# Patient Record
Sex: FEMALE | Race: WHITE | NOT HISPANIC OR LATINO | Employment: PART TIME | URBAN - METROPOLITAN AREA
[De-identification: names, ages, dates, MRNs, and addresses within clinical notes are randomized per-mention and may not be internally consistent; named-entity substitution may affect disease eponyms.]

---

## 2017-03-08 ENCOUNTER — OFFICE VISIT (OUTPATIENT)
Dept: OBGYN | Facility: CLINIC | Age: 22
End: 2017-03-08
Attending: NURSE PRACTITIONER
Payer: COMMERCIAL

## 2017-03-08 VITALS
HEIGHT: 64 IN | DIASTOLIC BLOOD PRESSURE: 76 MMHG | WEIGHT: 212.7 LBS | SYSTOLIC BLOOD PRESSURE: 118 MMHG | BODY MASS INDEX: 36.31 KG/M2 | HEART RATE: 60 BPM

## 2017-03-08 DIAGNOSIS — Z30.011 ENCOUNTER FOR INITIAL PRESCRIPTION OF CONTRACEPTIVE PILLS: ICD-10-CM

## 2017-03-08 DIAGNOSIS — N89.8 VAGINAL ITCHING: ICD-10-CM

## 2017-03-08 DIAGNOSIS — Z00.00 VISIT FOR PREVENTIVE HEALTH EXAMINATION: Primary | ICD-10-CM

## 2017-03-08 DIAGNOSIS — B37.31 VULVOVAGINAL CANDIDIASIS: ICD-10-CM

## 2017-03-08 DIAGNOSIS — Z13.1 SCREENING FOR DIABETES MELLITUS: ICD-10-CM

## 2017-03-08 DIAGNOSIS — Z72.51 UNPROTECTED SEXUAL INTERCOURSE: ICD-10-CM

## 2017-03-08 DIAGNOSIS — Z11.3 SCREENING EXAMINATION FOR VENEREAL DISEASE: ICD-10-CM

## 2017-03-08 LAB
CLUE CELLS: NORMAL
HBA1C MFR BLD: 5.8 % (ref 4.3–6)
HCG UR QL: NEGATIVE
HCV AB SERPL QL IA: NORMAL
HIV 1+2 AB+HIV1 P24 AG SERPL QL IA: NORMAL
INTERNAL QC OK POCT: YES
TRICHOMONAS (WET PREP): NORMAL
YEAST (WET PREP): NORMAL

## 2017-03-08 PROCEDURE — 99212 OFFICE O/P EST SF 10 MIN: CPT | Mod: ZF

## 2017-03-08 PROCEDURE — 87389 HIV-1 AG W/HIV-1&-2 AB AG IA: CPT | Performed by: NURSE PRACTITIONER

## 2017-03-08 PROCEDURE — 87210 SMEAR WET MOUNT SALINE/INK: CPT | Mod: ZF | Performed by: NURSE PRACTITIONER

## 2017-03-08 PROCEDURE — 87591 N.GONORRHOEAE DNA AMP PROB: CPT | Performed by: NURSE PRACTITIONER

## 2017-03-08 PROCEDURE — 81025 URINE PREGNANCY TEST: CPT | Mod: ZF | Performed by: NURSE PRACTITIONER

## 2017-03-08 PROCEDURE — 83036 HEMOGLOBIN GLYCOSYLATED A1C: CPT | Performed by: NURSE PRACTITIONER

## 2017-03-08 PROCEDURE — 36415 COLL VENOUS BLD VENIPUNCTURE: CPT | Performed by: NURSE PRACTITIONER

## 2017-03-08 PROCEDURE — 87491 CHLMYD TRACH DNA AMP PROBE: CPT | Performed by: NURSE PRACTITIONER

## 2017-03-08 PROCEDURE — 86803 HEPATITIS C AB TEST: CPT | Performed by: NURSE PRACTITIONER

## 2017-03-08 PROCEDURE — 86780 TREPONEMA PALLIDUM: CPT | Performed by: NURSE PRACTITIONER

## 2017-03-08 RX ORDER — NORELGESTROMIN AND ETHINYL ESTRADIOL 35; 150 UG/MG; UG/MG
1 PATCH TRANSDERMAL WEEKLY
Qty: 3 PATCH | Refills: 11 | Status: SHIPPED | OUTPATIENT
Start: 2017-03-08 | End: 2019-08-28

## 2017-03-08 RX ORDER — LEVONORGESTREL 1.5 MG/1
1.5 TABLET ORAL ONCE
Qty: 1 TABLET | Refills: 0 | Status: SHIPPED | OUTPATIENT
Start: 2017-03-08 | End: 2019-08-28

## 2017-03-08 RX ORDER — FLUCONAZOLE 150 MG/1
150 TABLET ORAL ONCE
Qty: 1 TABLET | Refills: 2 | Status: SHIPPED | OUTPATIENT
Start: 2017-03-08 | End: 2017-03-08

## 2017-03-08 NOTE — LETTER
"3/8/2017       RE: Angelia Epstein  [address] [facility] [address]     Dear Colleague,    Thank you for referring your patient, Angelia Epstein, to the [facility] at [facility]. Please see a copy of my visit note below.    Progress Note    SUBJECTIVE:  Angelia Epstein is an 21 year old, , who requests an Annual Preventive Exam.     Concerns today include:   1) Recurrent yeast infections: Currently has thick, white \"clumpy\" discharge and itching x 2 week; feels her introitus and vulva are \"inflamed.\"  Has not tried to treat these symptoms with anything; has been getting yeast infections about once per month; typically self-treats with OTC Monistat 1 day, occasionally uses Monistat 3 day.  Was treated once, several months ago, at [facility] with Flu[facility]; she reports that she did not have a yeast infection for several months after this (feels it was much a more effective treatment). Denies pelvic pain, abdominal pain, or malodorous discharge.    2) Desire for Contraception: Had used the combined hormonal birth control patch for ~8-10 months this year.  Stopped using this 1 month ago when her prescription ran out.  She had been prescribed [facility] at a [facility].  Desires a prescription for the patch today; she was pleased with this method.  Last unprotected intercourse was 1 day prior to her LMP (~8 days ago); her last period was normal.  While not using the patch, she used condoms ~50% of the time.  LMP was 3/1/2017. Menses are typically regular, once per month; experiences moderate flow that lasts 4-5 days.  Does experience moderate cramping and onset of fevers with menstrual cycles, as well as mood swings, becoming more tearful and emotional.  Has discussed all other birth control options, including Depo Provera (doesn't like injections), COCs (had trouble remembering pills everyday), Nexplanon and IUDs (doesn't want " anything placed in her body & has friends who disliked these methods), and the NuvaRing (doesn't feel comfortable placing it), and declined all other methods.  Patient smokes ~4 cigarettes per week.  She is working on quitting. BMI: 36kg/m2.  Denies any hx of clotting, migraines, heart, liver, or kidney disorders.  No family hx of DVT.      3) Request for STD testing: Has had 5 partners in the last 2 months and 7 partners in the last 6 months.  Denies any known STD exposures.  No noticeable lesions.      PHQ-9 score today: 2  NABEEL-7 score today: 7  - Feel safe  - Denies any suicidal thoughts  - Reports it is somewhat difficult to do her daily work and take care of things at home.  She feels a lot of stress at work and at school.  She is studying to go into Social Work at a [employer] and hopes to transfer to the [employer] after completing her pre-requisites.  Works as a .  Living with her father.      Nutrition: eats out once per day; <4-5 fruits and vegetables per day  Exercise: has a [employer] membership; exercises 1-4 times per week.      Menstrual History:  Menstrual History 10/14/2014 3/8/2017 3/8/2017   LAST MENSTRUAL PERIOD 10/1/2014 3/1/2017 -   Period Cycle (Days) - - 28   Period Duration (Days) - - 5   Period Pattern - - Regular   Menstrual Flow - - Moderate   Reviewed Today - - Yes     Last pap: never had a pap smear  History of abnormal Pap smear: NO - under age 21, PAP not appropriate for age    HISTORY:    No current outpatient prescriptions on file prior to visit.  No current facility-administered medications on file prior to visit.   Allergies   Allergen Reactions     Cats Rash     Rash, eyes get swollen       Immunization History   Administered Date(s) Administered     DPT 02/25/1997, 09/02/1997, 02/25/1999, 08/14/2002, 04/04/2003     DTAP (<7y) 08/14/2002     Hepatitis B 01/02/1997, 02/25/1997, 09/02/1997     Human Papilloma Virus 08/17/2011, 02/29/2012, 10/14/2014     IPV  "2002     MMR 1998, 2002     Mantoux 03/10/2015     Meningococcal (Menactra ) 2010     OPV 1997, 1997, 1997, 2002     TDAP (ADACEL AGES 11-64) 2010     Varicella Not Indicated - By Hx 01/15/2005, 2005     Obstetric History       T0      TAB0   SAB0   E0   M0   L0      History reviewed. No pertinent past medical history.  Past Surgical History   Procedure Laterality Date     Cystectomy pilonidal  3/7/2012     Procedure:CYSTECTOMY PILONIDAL; I & D of Pilonidal cyst; Surgeon:ROSARIO GOODE; Location:[facility]     Head & neck surgery       tonsilectomy     Family History   Problem Relation Age of Onset     Allergies Father      Allergies Maternal Aunt      Allergies Paternal Aunt      Hypertension Paternal Grandfather      CEREBROVASCULAR DISEASE Paternal Grandfather      DIABETES Paternal Grandfather      Social History     Social History     Marital status: Single     Spouse name: N/A     Number of children: N/A     Years of education: N/A     Social History Main Topics     Smoking status: Light Tobacco Smoker     Smokeless tobacco: Never Used     Alcohol use No     Drug use: Yes     Sexual activity: Yes     Partners: Male     ROS  ROS: 10 point ROS neg other than the symptoms noted above in the HPI.    EXAM:  Blood pressure 118/76, pulse 60, height 1.632 m (5' 4.25\"), weight 96.5 kg (212 lb 11.2 oz), last menstrual period 2017. Body mass index is 36.23 kg/(m^2).  General - pleasant female in no acute distress.  Skin - no suspicious lesions or rashes  EENT-  PERRLA, euthyroid with out palpable nodules  Neck - supple without lymphadenopathy.  Lungs - clear to auscultation bilaterally.  Heart - regular rate and rhythm without murmur.  Abdomen - soft, nontender, nondistended, no masses or organomegaly noted.  Musculoskeletal - no gross deformities.  Neurological - normal strength, sensation, and mental status.    Breast Exam:  Breast: Without " visible skin changes. No dimpling or lesions seen.   Breasts supple, non-tender with palpation, no dominant mass, nodularity, or nipple discharge noted bilaterally. Axillary nodes negative.    Pelvic Exam:  EG/BUS: introitus and bilateral labia minora erythematous; without lesions or abnormal secretions; Bartholin's, Urethra, [place]'s normal  Urethral meatus: normal   Urethra: no masses, tenderness, or scarring   Bladder: no masses or tenderness   Vagina: moist, pink, rugae with white and cheesy secretions  Cervix: pink, moist, closed, without lesion or CMT  Uterus: anteverted, and small, smooth, firm, mobile w/o pain  Adnexa: Within normal limits and no masses, nodularity, tenderness  Rectum: anus normal     ASSESSMENT:  Encounter Diagnoses   Name Primary?     Visit for preventive health examination Yes     Screening examination for venereal disease      Vaginal itching      Unprotected sexual intercourse      Encounter for initial prescription of contraceptive pills      Vulvovaginal candidiasis      Screening for diabetes mellitus       PLAN:   Orders Placed This Encounter   Procedures     Anti Treponema     Hepatitis C antibody     HIV Antigen Antibody Combo     Hgb A1c     Wet Prep POCT     hCG qual urine POCT     Orders Placed This Encounter   Medications     levonorgestrel (PLAN B) 1.5 MG TABS tablet     Sig: Take 1 tablet (1.5 mg) by mouth once for 1 dose     Dispense:  1 tablet     Refill:  0     norelgestromin-ethinyl estradiol (XULANE) 150-35 MCG/24HR patch     Sig: Place 1 patch onto the skin once a week     Dispense:  3 patch     Refill:  11     fluconazole (DIFLUCAN) 150 MG tablet     Sig: Take 1 tablet (150 mg) by mouth once for 1 dose     Dispense:  1 tablet     Refill:  2   UPT was negative today. Discussed all birth control methods including their method of use, benefits, and risks.  Patient declined all other birth control methods except for the combined hormonal patch.  She was counseled on the  increased risk of DVT and stroke with the combination of estrogen, obesity, and smoking.  Patient expressed understanding and is willing to work on quitting smoking (~4 cigarettes per week).  Offered assistance, but patient declined.  Has friends who have been taking the cigarettes away from her and are supportive of her quitting.  Discussed proper placement of the patch on her upper body, avoiding the breasts.  Each patch to stay in place for 1 week.  Remove patch after 1 week and place a new patch.  After using the patches for 3 weeks, patient to take 1 week off and can expect to have a withdrawal bleed.  Patient also requested a Plan B prescription; Rx provided.  Recommended condom use for STD prevention.      Discussed recommendations for diet and exercise and the goal of 150 minutes of exercise per week and 4-5 + fruits/ vegetables.  Encouraged Angelia to eat out less and prepare meals at home more often.      STD testing completed today.  Screen for diabetes was ordered today given BMI and recurrent yeast infections.    Offered Flu vaccine; patient declined.    First pap smear due in 1 year.    Vulvovaginal candidiasis: Treat with Fluconazole 150mg PO; if symptoms are not resolved in 3 days, may take 2nd dose.  Also provided patient with 1 refill.  Encouraged that if she self-treats yeast infections, to choose Monistat 5 day rather than 1 day.     Encouraged patient to establish care with psychologist at [facility].  Patient to return to clinic if mental health symptoms worsen and/or she is interested in pharmacotherapy.      Additional teaching done at this visit regarding self breast exam, exercise, birth control, smoking cessation, mental health and weight/diet.    Return to clinic in one year.  Follow-up as needed.    Anna Navas, DNP, APRN, WHNP

## 2017-03-08 NOTE — MR AVS SNAPSHOT
After Visit Summary   3/8/2017    Angelia Epstein    MRN: [id number]           Patient Information     Date Of Birth          1995        Visit Information        Provider Department      3/8/2017 1:00 PM Anna Navas APRN CNP [facility]        Today's Diagnoses     Visit for preventive health examination    -  1    Screening examination for venereal disease        Vaginal itching        Unprotected sexual intercourse        Encounter for initial prescription of contraceptive pills        Vulvovaginal candidiasis          Care Instructions    Your pregnancy test was negative today.  You may start the birth control patch today.  Place this weekly on upper body, avoiding the breasts.  Change the placement of the patch each week to avoid skin irritation.  After using the patch for 3 weeks, leave the patch off for 1 week; you can expect a withdrawal bleed at this time.  Plan B prescription was also provided.    Encouraged quitting smoking.  Will screen for diabetes today.    Yeast infection: Take 1 dose of Fluconazole today.  If you symptoms are not completely resolve, may take a 2nd dose 72 hours after the first dose.  I also wrote 1 extra refill.    Gonorrhea & chlamydia tests were done today.  Please go to the lab to have your other STD labs completed.     PREVENTIVE HEALTH RECOMMENDATIONS:   Most women need a yearly breast and pelvic exam.    A PAP screen, a test done DURING a pelvic exam, is NO longer recommended yearly.    March 2013, screening guidelines recommended by ACOG for PAP screen are:    1) First pap at age 21.    2) Pap every 3 years until age 30.    3) After age 30, pap every 3 years or Pap with HR HPV screen every 5 years until age 65.  4) Women do NOT need a vaginal Pap screen after a hysterectomy (surgical removal of the uterus) when they have not had cancer.    Exceptions:  1) Yearly pap if HIV+ or immunosuppressed secondary to organ transplant  2)  CLARY II-III continue routine screening for 20 years.    I encourage you continue looking for opportunities to choose a healthy lifestyle:       * Choose to eat a heart healthy diet. Check out the FOOD PLATE guidelines at: http://www.choosemyplate.gov/ for helpful hints on weight and cholesterol management.  Balance your caloric intake with exercise to maintain a BMI in the 22 to 26 range. For bone health: Eat calcium-rich foods like yogurt, broccoli or take chewable calcium pills (500 to 600 mg) twice a day with food.       * Exercise for at least an average of 30 minutes a day, 5 days of the week. This will help you control your weight, release stress, and help prevent disease.      * Take a Vitamin D3 supplement daily fall through spring and during summer unless you cgpa24-36' full body sun exposure to skin without sunscreen.      * DO wear sunscreen to prevent skin cancer after the first 15-30 minutes.      * Identify stressors in your life, find ways to release the stress, and, make time for yourself. PLEASE ask for help if mood changes last longer than two weeks.     * Limit alcohol to one drink per day.  No smoking.  Avoid second hand smoke. If you smoke, ask for help to stop.       *  If you are in a sexual relationship, talk with your partner about possible infection risks and take action to protect yourself from exposure to a sexual infection.    Please request an infection screen for STIs (sexually transmitted infections) if you are less than age 26 OR believe that you may be at risk.     Get a flu shot each year. Get a tetanus shot every 10 years. EVERYONE needs a pertussis (Whooping cough) booster.    See your dentist twice a year for an exam and preventive care cleaning.     Consider the following screen tests:    1) cholesterol test every 5 years.     2) yearly mammogram after age 40 unless you have identified risks.    3) colonoscopy every 10 years after age 50 unless you have identified risks.    4)  diabetes blood test screening if you are at risk for diabetes.      Additional information that you may also find helpful:  The Internet now gives us access to LOTS of information -- some of it helpful, research documented and also plenty of harmful, anecdotal information that may not pertain to your situtaion. Consider visiting the following websites for accurate health information:    www.vitamindcouncil.org/ : Info and ongoing research re Vitamin D    www.fairview.org : Up to date and easily searchable information on multiple topics.    www.medlineplus.gov : medication info, interactive tutorials, watch real surgeries online    www.cdc.gov : public health info, travel advisories, epidemics (H1N1)    www.mike/std.org: current research re diagnosis, treatment and prevention of sexually contacted infections.    www.health.[facility].[place] : [place] dept of heat, public health issues in [place], N1N1    www.familydoctor.org : good info from the Academy of Family Physicians              Follow-ups after your visit        Follow-up notes from your care team     Return in 1 year (on 3/8/2018) for Preventative Health Visit.      Who to contact     Please call your clinic at [phone] to:    Ask questions about your health    Make or cancel appointments    Discuss your medicines    Learn about your test results    Speak to your doctor   If you have compliments or concerns about an experience at your clinic, or if you wish to file a complaint, please contact [facility] Patient Relations at [phone] or email us at [email or web address]         Additional Information About Your Visit        [email or web address] Information     TextPayMe is an electronic gateway that provides easy, online access to your medical records. With TextPayMe, you can request a clinic appointment, read your test results, renew a prescription or communicate with your care team.     To sign up for [employer] visit the website at  "[email or web address]   You will be asked to enter the access code listed below, as well as some personal information. Please follow the directions to create your username and password.     Your access code is: [email or web address]  Expires: 2017  2:00 PM     Your access code will  in 90 days. If you need help or a new code, please contact your [facility] or call [phone] for assistance.        Care EveryWhere ID     This is your Care EveryWhere ID. This could be used by other organizations to access your [facility] medical records  [id number]        Your Vitals Were     Pulse Height Last Period BMI (Body Mass Index)          60 1.632 m (5' 4.25\") 2017 (Exact Date) 36.23 kg/m2         Blood Pressure from Last 3 Encounters:   17 118/76   07/28/15 106/64   07/14/15 116/69    Weight from Last 3 Encounters:   17 96.5 kg (212 lb 11.2 oz)   07/28/15 93.4 kg (206 lb) (98 %)*   07/14/15 91.6 kg (202 lb) (98 %)*     * Growth percentiles are based on CDC 2-20 Years data.              We Performed the Following     Anti Treponema     Chlamydia trachomatis PCR Swab      hCG qual urine POCT     Hepatitis C antibody     HIV Antigen Antibody Combo     Neisseria gonorrhoeae PCR Swab [[id number]]     Wet Prep POCT          Today's Medication Changes          These changes are accurate as of: 3/8/17  2:04 PM.  If you have any questions, ask your nurse or doctor.               Start taking these medicines.        Dose/Directions    fluconazole 150 MG tablet   Commonly known as:  DIFLUCAN   Used for:  Vulvovaginal candidiasis   Started by:  Anna Navas APRN CNP        Dose:  150 mg   Take 1 tablet (150 mg) by mouth once for 1 dose   Quantity:  1 tablet   Refills:  2       levonorgestrel 1.5 MG Tabs tablet   Commonly known as:  PLAN B   Used for:  Encounter for initial prescription of contraceptive pills   Started by:  Anna Navas APRN CNP        Dose:  1.5 mg "   Take 1 tablet (1.5 mg) by mouth once for 1 dose   Quantity:  1 tablet   Refills:  0       norelgestromin-ethinyl estradiol 150-35 MCG/24HR patch   Commonly known as:  XULANE   Used for:  Encounter for initial prescription of contraceptive pills   Started by:  Anna Navas APRN CNP        Dose:  1 patch   Place 1 patch onto the skin once a week   Quantity:  3 patch   Refills:  11            Where to get your medicines      These medications were sent to [facility] [address] [place] [address] & [address] [address]    Hours:  24-hours Phone:  [phone]     fluconazole 150 MG tablet    levonorgestrel 1.5 MG Tabs tablet    norelgestromin-ethinyl estradiol 150-35 MCG/24HR patch                Primary Care Provider Office Phone # Fax #    Long Grossman [phone]       [facility] [address] [place] [id number]        Thank you!     Thank you for choosing [facility]  for your care. Our goal is always to provide you with excellent care. Hearing back from our patients is one way we can continue to improve our services. Please take a few minutes to complete the written survey that you may receive in the mail after your visit with us. Thank you!             Your Updated Medication List - Protect others around you: Learn how to safely use, store and throw away your medicines at www.disposemymeds.org.          This list is accurate as of: 3/8/17  2:04 PM.  Always use your most recent med list.                   Brand Name Dispense Instructions for use    fluconazole 150 MG tablet    DIFLUCAN    1 tablet    Take 1 tablet (150 mg) by mouth once for 1 dose       levonorgestrel 1.5 MG Tabs tablet    PLAN B    1 tablet    Take 1 tablet (1.5 mg) by mouth once for 1 dose       norelgestromin-ethinyl estradiol 150-35 MCG/24HR patch    XULANE    3 patch    Place 1 patch onto  the skin once a week

## 2017-03-08 NOTE — PROGRESS NOTES
"    Progress Note    SUBJECTIVE:  Angelia Epstein is an 21 year old, , who requests an Annual Preventive Exam.     Concerns today include:   1) Recurrent yeast infections: Currently has thick, white \"clumpy\" discharge and itching x 2 week; feels her introitus and vulva are \"inflamed.\"  Has not tried to treat these symptoms with anything; has been getting yeast infections about once per month; typically self-treats with OTC Monistat 1 day, occasionally uses Monistat 3 day.  Was [facility] once, [facility] months ago, at [facility]; she reports that she did not have a yeast infection for several months after this (feels it was much a more effective treatment). Denies pelvic pain, abdominal pain, or malodorous discharge.    2) Desire for Contraception: Had used the combined hormonal birth control patch for ~8-10 months this year.  Stopped using this 1 month ago when her prescription ran out.  She had [facility] a [facility].  Desires a prescription for the patch today; she was pleased with this method.  Last unprotected intercourse was 1 day prior to her LMP (~8 days ago); her last period was normal.  While not using the patch, she used condoms ~50% of the time.  LMP was 3/1/2017. Menses are typically regular, once per month; experiences moderate flow that lasts 4-5 days.  Does experience moderate cramping and onset of fevers with menstrual cycles, as well as mood swings, becoming more tearful and emotional.  Has discussed all other birth control options, including Depo Provera (doesn't like injections), COCs (had trouble remembering pills everyday), Nexplanon and IUDs (doesn't want anything placed in her body & has friends who disliked these methods), and the NuvaRing (doesn't feel comfortable placing it), and declined all other methods.  Patient smokes ~4 cigarettes per week.  She is working on quitting. BMI: 36kg/m2.  Denies any hx of clotting, migraines, heart, liver, or " kidney disorders.  No family hx of DVT.      3) Request for STD testing: Has had 5 partners in the last 2 months and 7 partners in the last 6 months.  Denies any known STD exposures.  No noticeable lesions.      PHQ-9 score today: 2  NABEEL-7 score today: 7  - Feel safe  - Denies any suicidal thoughts  - Reports it is somewhat difficult to do her daily work and take care of things at home.  She feels a lot of stress at work and at school.  She is studying to go into Social Work at a [employer] and hopes to transfer to the [employer] after completing her pre-requisites.  Works as a .  Living with her father.      Nutrition: eats out once per day; <4-5 fruits and vegetables per day  Exercise: has a [employer] membership; exercises 1-4 times per week.      Menstrual History:  Menstrual History 10/14/2014 3/8/2017 3/8/2017   LAST MENSTRUAL PERIOD 10/1/2014 3/1/2017 -   Period Cycle (Days) - - 28   Period Duration (Days) - - 5   Period Pattern - - Regular   Menstrual Flow - - Moderate   Reviewed Today - - Yes     Last pap: never had a pap smear  History of abnormal Pap smear: NO - under age 21, PAP not appropriate for age    HISTORY:    No current outpatient prescriptions on file prior to visit.  No current facility-administered medications on file prior to visit.   Allergies   Allergen Reactions     Cats Rash     Rash, eyes get swollen       Immunization History   Administered Date(s) Administered     DPT 1997, 1997, 1999, 2002, 2003     DTAP (<7y) 2002     Hepatitis B 1997, 1997, 1997     Human Papilloma Virus 2011, 2012, 10/14/2014     IPV 2002     MMR 1998, 2002     Mantoux 03/10/2015     Meningococcal (Menactra ) 2010     OPV 1997, 1997, 1997, 2002     TDAP (ADACEL AGES 11-64) 2010     Varicella Not Indicated - By Hx 01/15/2005, 2005     Obstetric History       T0    "   TAB0   SAB0   E0   M0   L0      History reviewed. No pertinent past medical history.  Past Surgical History   Procedure Laterality Date     Cystectomy pilonidal  3/7/2012     Procedure:CYSTECTOMY PILONIDAL; I & D of Pilonidal cyst; Surgeon:ROSARIO GOODE; Location:[facility]     Head & neck surgery       tonsilectomy     Family History   Problem Relation Age of Onset     Allergies Father      Allergies Maternal Aunt      Allergies Paternal Aunt      Hypertension Paternal Grandfather      CEREBROVASCULAR DISEASE Paternal Grandfather      DIABETES Paternal Grandfather      Social History     Social History     Marital status: Single     Spouse name: N/A     Number of children: N/A     Years of education: N/A     Social History Main Topics     Smoking status: Light Tobacco Smoker     Smokeless tobacco: Never Used     Alcohol use No     Drug use: Yes     Sexual activity: Yes     Partners: Male     ROS  ROS: 10 point ROS neg other than the symptoms noted above in the HPI.    EXAM:  Blood pressure 118/76, pulse 60, height 1.632 m (5' 4.25\"), weight 96.5 kg (212 lb 11.2 oz), last menstrual period 2017. Body mass index is 36.23 kg/(m^2).  General - pleasant female in no acute distress.  Skin - no suspicious lesions or rashes  EENT-  PERRLA, euthyroid with out palpable nodules  Neck - supple without lymphadenopathy.  Lungs - clear to auscultation bilaterally.  Heart - regular rate and rhythm without murmur.  Abdomen - soft, nontender, nondistended, no masses or organomegaly noted.  Musculoskeletal - no gross deformities.  Neurological - normal strength, sensation, and mental status.    Breast Exam:  Breast: Without visible skin changes. No dimpling or lesions seen.   Breasts supple, non-tender with palpation, no dominant mass, nodularity, or nipple discharge noted bilaterally. Axillary nodes negative.    Pelvic Exam:  EG/BUS: introitus and bilateral labia minora erythematous; without lesions or abnormal " secretions; Bartholin's, Urethra, Herreid's normal  Urethral meatus: normal   Urethra: no masses, tenderness, or scarring   Bladder: no masses or tenderness   Vagina: moist, pink, rugae with white and cheesy secretions  Cervix: pink, moist, closed, without lesion or CMT  Uterus: anteverted, and small, smooth, firm, mobile w/o pain  Adnexa: Within normal limits and no masses, nodularity, tenderness  Rectum: anus normal     ASSESSMENT:  Encounter Diagnoses   Name Primary?     Visit for preventive health examination Yes     Screening examination for venereal disease      Vaginal itching      Unprotected sexual intercourse      Encounter for initial prescription of contraceptive pills      Vulvovaginal candidiasis      Screening for diabetes mellitus       PLAN:   Orders Placed This Encounter   Procedures     Anti Treponema     Hepatitis C antibody     HIV Antigen Antibody Combo     Hgb A1c     Wet Prep POCT     hCG qual urine POCT     Orders Placed This Encounter   Medications     levonorgestrel (PLAN B) 1.5 MG TABS tablet     Sig: Take 1 tablet (1.5 mg) by mouth once for 1 dose     Dispense:  1 tablet     Refill:  0     norelgestromin-ethinyl estradiol (XULANE) 150-35 MCG/24HR patch     Sig: Place 1 patch onto the skin once a week     Dispense:  3 patch     Refill:  11     fluconazole (DIFLUCAN) 150 MG tablet     Sig: Take 1 tablet (150 mg) by mouth once for 1 dose     Dispense:  1 tablet     Refill:  2   UPT was negative today. Discussed all birth control methods including their method of use, benefits, and risks.  Patient declined all other birth control methods except for the combined hormonal patch.  She was counseled on the increased risk of DVT and stroke with the combination of estrogen, obesity, and smoking.  Patient expressed understanding and is willing to work on quitting smoking (~4 cigarettes per week).  Offered assistance, but patient declined.  Has friends who have been taking the cigarettes away from her  and are supportive of her quitting.  Discussed proper placement of the patch on her upper body, avoiding the breasts.  Each patch to stay in place for 1 week.  Remove patch after 1 week and place a new patch.  After using the patches for 3 weeks, patient to take 1 week off and can expect to have a withdrawal bleed.  Patient also requested a Plan B prescription; Rx provided.  Recommended condom use for STD prevention.      Discussed recommendations for diet and exercise and the goal of 150 minutes of exercise per week and 4-5 + fruits/ vegetables.  Encouraged Angelia to eat out less and prepare meals at home more often.      STD testing completed today.  Screen for diabetes was ordered today given BMI and recurrent yeast infections.    Offered Flu vaccine; patient declined.    First pap smear due in 1 year.    Vulvovaginal candidiasis: Treat with Fluconazole 150mg PO; if symptoms are not resolved in 3 days, may take 2nd dose.  Also provided patient with 1 refill.  Encouraged that if she self-treats yeast infections, to choose Monistat 5 day rather than 1 day.     Encouraged patient to establish care with psychologist at [facility].  Patient to return to clinic if mental health symptoms worsen and/or she is interested in pharmacotherapy.      Additional teaching done at this visit regarding self breast exam, exercise, birth control, smoking cessation, mental health and weight/diet.    Return to clinic in one year.  Follow-up as needed.  Anna Navas, DNP, APRN, WHNP

## 2017-03-08 NOTE — PATIENT INSTRUCTIONS
Your pregnancy test was negative today.  You may start the birth control patch today.  Place this weekly on upper body, avoiding the breasts.  Change the placement of the patch each week to avoid skin irritation.  After using the patch for 3 weeks, leave the patch off for 1 week; you can expect a withdrawal bleed at this time.  Plan B prescription was also provided.    Encouraged quitting smoking.  Will screen for diabetes today.    Yeast infection: Take 1 dose of Fluconazole today.  If you symptoms are not completely resolve, may take a 2nd dose 72 hours after the first dose.  I also wrote 1 extra refill.    Gonorrhea & chlamydia tests were done today.  Please go to the lab to have your other STD labs completed.     PREVENTIVE HEALTH RECOMMENDATIONS:   Most women need a yearly breast and pelvic exam.    A PAP screen, a test done DURING a pelvic exam, is NO longer recommended yearly.    March 2013, screening guidelines recommended by ACOG for PAP screen are:    1) First pap at age 21.    2) Pap every 3 years until age 30.    3) After age 30, pap every 3 years or Pap with HR HPV screen every 5 years until age 65.  4) Women do NOT need a vaginal Pap screen after a hysterectomy (surgical removal of the uterus) when they have not had cancer.    Exceptions:  1) Yearly pap if HIV+ or immunosuppressed secondary to organ transplant  2) CLARY II-III continue routine screening for 20 years.    I encourage you continue looking for opportunities to choose a healthy lifestyle:       * Choose to eat a heart healthy diet. Check out the FOOD PLATE guidelines at: http://www.choosemyplate.gov/ for helpful hints on weight and cholesterol management.  Balance your caloric intake with exercise to maintain a BMI in the 22 to 26 range. For bone health: Eat calcium-rich foods like yogurt, broccoli or take chewable calcium pills (500 to 600 mg) twice a day with food.       * Exercise for at least an average of 30 minutes a day, 5 days of the  week. This will help you control your weight, release stress, and help prevent disease.      * Take a Vitamin D3 supplement daily fall through spring and during summer unless you [id number]' full body sun exposure to skin without sunscreen.      * DO wear sunscreen to prevent skin cancer after the first 15-30 minutes.      * Identify stressors in your life, find ways to release the stress, and, make time for yourself. PLEASE ask for help if mood changes last longer than two weeks.     * Limit alcohol to one drink per day.  No smoking.  Avoid second hand smoke. If you smoke, ask for help to stop.       *  If you are in a sexual relationship, talk with your partner about possible infection risks and take action to protect yourself from exposure to a sexual infection.    Please request an infection screen for STIs (sexually transmitted infections) if you are less than age 26 OR believe that you may be at risk.     Get a flu shot each year. Get a tetanus shot every 10 years. EVERYONE needs a pertussis (Whooping cough) booster.    See your dentist twice a year for an exam and preventive care cleaning.     Consider the following screen tests:    1) cholesterol test every 5 years.     2) yearly mammogram after age 40 unless you have identified risks.    3) colonoscopy every 10 years after age 50 unless you have identified risks.    4) diabetes blood test screening if you are at risk for diabetes.      Additional information that you may also find helpful:  The Internet now gives us access to LOTS of information -- some of it helpful, research documented and also plenty of harmful, anecdotal information that may not pertain to your situtaion. Consider visiting the following websites for accurate health information:    www.vitamindcouncil.org/ : Info and ongoing research re Vitamin D    www.fairview.org : Up to date and easily searchable information on multiple topics.    www.medlineplus.gov : medication info, interactive  tutorials, watch real surgeries online    www.cdc.gov : public health info, travel advisories, epidemics (H1N1)    www.mike/std.org: current research re diagnosis, treatment and prevention of sexually contacted infections.    www.health.[facility].[place].us : [place] dept of heatl, public health issues in [place], N1N1    www.familydoctor.org : good info from the Academy of Family Physicians

## 2017-03-09 LAB — T PALLIDUM IGG+IGM SER QL: NEGATIVE

## 2017-03-10 LAB
C TRACH DNA SPEC QL NAA+PROBE: NORMAL
N GONORRHOEA DNA SPEC QL NAA+PROBE: NORMAL
SPECIMEN SOURCE: NORMAL
SPECIMEN SOURCE: NORMAL

## 2018-02-25 ENCOUNTER — HEALTH MAINTENANCE LETTER (OUTPATIENT)
Age: 23
End: 2018-02-25

## 2019-08-28 ENCOUNTER — OFFICE VISIT (OUTPATIENT)
Dept: FAMILY MEDICINE | Facility: CLINIC | Age: 24
End: 2019-08-28
Attending: FAMILY MEDICINE
Payer: COMMERCIAL

## 2019-08-28 VITALS
DIASTOLIC BLOOD PRESSURE: 77 MMHG | HEIGHT: 64 IN | WEIGHT: 244 LBS | HEART RATE: 55 BPM | SYSTOLIC BLOOD PRESSURE: 112 MMHG | BODY MASS INDEX: 41.66 KG/M2

## 2019-08-28 DIAGNOSIS — F41.1 GAD (GENERALIZED ANXIETY DISORDER): ICD-10-CM

## 2019-08-28 DIAGNOSIS — R73.03 PREDIABETES: ICD-10-CM

## 2019-08-28 DIAGNOSIS — F40.10 SOCIAL ANXIETY DISORDER: ICD-10-CM

## 2019-08-28 DIAGNOSIS — F41.9 ANXIETY: ICD-10-CM

## 2019-08-28 DIAGNOSIS — R53.82 CHRONIC FATIGUE: Primary | ICD-10-CM

## 2019-08-28 DIAGNOSIS — N91.4 SECONDARY OLIGOMENORRHEA: ICD-10-CM

## 2019-08-28 DIAGNOSIS — F32.1 CURRENT MODERATE EPISODE OF MAJOR DEPRESSIVE DISORDER WITHOUT PRIOR EPISODE (H): ICD-10-CM

## 2019-08-28 LAB
HBA1C MFR BLD: 5.3 % (ref 0–5.6)
HGB BLD-MCNC: 14.4 G/DL (ref 11.7–15.7)
TSH SERPL DL<=0.005 MIU/L-ACNC: 1.8 MU/L (ref 0.4–4)

## 2019-08-28 PROCEDURE — G0463 HOSPITAL OUTPT CLINIC VISIT: HCPCS

## 2019-08-28 PROCEDURE — 85018 HEMOGLOBIN: CPT | Performed by: FAMILY MEDICINE

## 2019-08-28 PROCEDURE — 82306 VITAMIN D 25 HYDROXY: CPT | Performed by: FAMILY MEDICINE

## 2019-08-28 PROCEDURE — 36415 COLL VENOUS BLD VENIPUNCTURE: CPT | Performed by: FAMILY MEDICINE

## 2019-08-28 PROCEDURE — 84443 ASSAY THYROID STIM HORMONE: CPT | Performed by: FAMILY MEDICINE

## 2019-08-28 PROCEDURE — 83036 HEMOGLOBIN GLYCOSYLATED A1C: CPT | Performed by: FAMILY MEDICINE

## 2019-08-28 RX ORDER — HYDROXYZINE PAMOATE 25 MG/1
25 CAPSULE ORAL 3 TIMES DAILY PRN
Qty: 90 CAPSULE | Refills: 0 | Status: SHIPPED | OUTPATIENT
Start: 2019-08-28 | End: 2020-02-24

## 2019-08-28 ASSESSMENT — ANXIETY QUESTIONNAIRES
3. WORRYING TOO MUCH ABOUT DIFFERENT THINGS: MORE THAN HALF THE DAYS
1. FEELING NERVOUS, ANXIOUS, OR ON EDGE: NEARLY EVERY DAY
6. BECOMING EASILY ANNOYED OR IRRITABLE: NEARLY EVERY DAY
7. FEELING AFRAID AS IF SOMETHING AWFUL MIGHT HAPPEN: SEVERAL DAYS
5. BEING SO RESTLESS THAT IT IS HARD TO SIT STILL: SEVERAL DAYS
GAD7 TOTAL SCORE: 13
2. NOT BEING ABLE TO STOP OR CONTROL WORRYING: MORE THAN HALF THE DAYS

## 2019-08-28 ASSESSMENT — PATIENT HEALTH QUESTIONNAIRE - PHQ9
SUM OF ALL RESPONSES TO PHQ QUESTIONS 1-9: 19
5. POOR APPETITE OR OVEREATING: SEVERAL DAYS

## 2019-08-28 ASSESSMENT — MIFFLIN-ST. JEOR: SCORE: 1855.75

## 2019-08-28 NOTE — NURSING NOTE
Chief Complaint   Patient presents with     Establish Care     Patient here to talk about depression and anxiety

## 2019-08-28 NOTE — LETTER
"8/28/2019       RE: Angelia Epstein  [address]  [place] [address]     Dear Colleague,    Thank you for referring your patient, Angelia Epstein, to the [facility]. Please see a copy of my visit note below.    Pt. Here for multiple issues including  gyne care and anxiety    1. Anxiety, Depression  When younger, used to get panic attacks,anxiety--started in 6th grade, learned to control them by relaxation and breathing  Continued to have some anxiety; in 2015 in freshman year college got more severe, was away from home  Switched colleges near home, which helped for about 6 months.Saw therapist a year ago for 3 visits  Anxiety worse again 6 months after moving home, no appearant trigger    Pt cries when talks about her symptoms, experiences stomach pain, shortness of breath with anxietyHard to breathe, stomach hurts.  School very stressful--worried about not graduating on time, feel like failure re: 1st year college; has difficulty relationship with parents  Mother with alcoholism, she is now living with father    PHQ=19--always irritable, oversleeping, overeating, no SI  NABEEL 7=13  Panic attacks--2x/wk last 10-30 minutes    Mental Health ROS:  Occasional  feeling of being \"not really there\", feeling slowed  Occasional periods of not needing sleep, but no other manic symptoms  No compulsions, obsessions; No hallucinations  ETOH-- 2/wk  3-4/sitting  No recreational substance use    ---standard diet, fatigue even if normal sleep  --weight weight gain over 3-6 months: 20#  Due to overeating    FHX:   mat grandfather bipolar  Mother--ETOH, depression, anxiety  Father--anxiety, OCD    2. Oligomenorrhea  Before on birth control: menses regular, severe cramping, lasted 5 days, not heavy, fever to 100  Age 18:  started on [facility]a [facility]--periods regular, but not much difference in symptoms. Stopped after 6 " "months. Using condoms for contraception since    Menses regular until 1 year ago. Now irregular, oligomenorrhea, with longest between menses 2 months.   Cramping day before and day of, gest hot, emotional, spotting to 5 days  Weight gained in last year 30#  No acne, no extra body hair  No extra thirst or urination  With cough or sneeze, feel like need to urinate    Saw antonia Navas here at [facility] in 2017, note reviewed including gyne exam--diagnosed with Pre DM with hgbA1c 5.8%  Did HPV vaccine    Chronic medical problems  None    FHX  No  cancer  No VTE    SH  Smoking--none, vaping--none    ROS   12 point ROS negative except where noted above    PE  Blood pressure 112/77, pulse 55, height 1.632 m (5' 4.25\"), weight 110.7 kg (244 lb).  EXAM:  Constitutional: healthy, alert and no distress   Cardiovascular: negative, PMI normal. No lifts, heaves, or thrills. RRR. No murmurs, clicks gallops or rub  Respiratory: negative, Percussion normal. Good diaphragmatic excursion. Lungs clear  Psychiatric: Speech RRR, mood is depressed and anxious, no psychomotor changes, thought process is appropriate,   Affect is normal. No evidence of suicidality.   Skin: no acne, no extra body/facial hair  Neck: Neck supple. No adenopathy. Thyroid symmetric, normal size,, Carotids without bruits.      A/P  1.MDD, moderately severe  2. NABEEL  Counseled patient on the nature and treatment of these conditions, including role of medications, types of medications, role of therapy and that both together are more effective  Reviewed medication options and will start Prozac 20 mg daily.  I've explained to her that drugs of the SSRI class can have side effects such as weight gain, sexual dysfunction, insomnia, headache, nausea. These medications are generally effective at alleviating symptoms of anxiety and/or depression. Let me know if significant side effects do occur.    3. Oligomenorrhea  Counseled patient regarding effects of weight gain, stressors " on menses  Will need further work up including potential evalution for PCOS, ultrasound  Will discuss treatment options further at next visit     Check: Hgb, TSH, Vit D, HgbA1c    Follow-up 1 month pap and med check    Again, thank you for allowing me to participate in the care of your patient.      Sincerely,    Omero Hernandez MD

## 2019-08-28 NOTE — PROGRESS NOTES
"Pt. Here for multiple issues including  gyne care and anxiety    1. Anxiety, Depression  When younger, used to get panic attacks,anxiety--started in 6th grade, learned to control them by relaxation and breathing  Continued to have some anxiety; in 2015 in freshman year college got more severe, was away from home  Switched colleges near home, which helped for about 6 months.Saw therapist a year ago for 3 visits  Anxiety worse again 6 months after moving home, no appearant trigger    Pt cries when talks about her symptoms, experiences stomach pain, shortness of breath with anxietyHard to breathe, stomach hurts.  School very stressful--worried about not graduating on time, feel like failure re: 1st year college; has difficulty relationship with parents  Mother with alcoholism, she is now living with father    PHQ=19--always irritable, oversleeping, overeating, no SI  NABEEL 7=13  Panic attacks--2x/wk last 10-30 minutes    Mental Health ROS:  Occasional  feeling of being \"not really there\", feeling slowed  Occasional periods of not needing sleep, but no other manic symptoms  No compulsions, obsessions; No hallucinations  ETOH-- 2/wk  3-4/sitting  No recreational substance use    ---standard diet, fatigue even if normal sleep  --weight weight gain over 3-6 months: 20#  Due to overeating      FHX:   mat grandfather bipolar  Mother--ETOH, depression, anxiety  Father--anxiety, OCD    2. Oligomenorrhea  Before on birth control: menses regular, severe cramping, lasted 5 days, not heavy, fever to 100  Age 18:  started on [facility]a patch [facility]--periods regular, but not much difference in symptoms. Stopped after 6 months. Using condoms for contraception since    Menses regular until 1 year ago. Now irregular, oligomenorrhea, with longest between menses 2 months.   Cramping day before and day of, gest hot, emotional, spotting to 5 days  Weight gained in last year 30#  No acne, no extra body hair  No extra " "thirst or urination  With cough or sneeze, feel like need to urinate    Saw antonia Daniellejoceline here at [facility] in 2017, note reviewed including gyne exam--diagnosed with Pre DM with hgbA1c 5.8%  Did HPV vaccine      Chronic medical problems  None      FHX  No  cancer  No VTE    SH  Smoking--none, vaping--none    ROS   12 point ROS negative except where noted above    PE  Blood pressure 112/77, pulse 55, height 1.632 m (5' 4.25\"), weight 110.7 kg (244 lb).  EXAM:  Constitutional: healthy, alert and no distress   Cardiovascular: negative, PMI normal. No lifts, heaves, or thrills. RRR. No murmurs, clicks gallops or rub  Respiratory: negative, Percussion normal. Good diaphragmatic excursion. Lungs clear  Psychiatric: Speech RRR, mood is depressed and anxious, no psychomotor changes, thought process is appropriate,   Affect is normal. No evidence of suicidality.   Skin: no acne, no extra body/facial hair  Neck: Neck supple. No adenopathy. Thyroid symmetric, normal size,, Carotids without bruits.      A/P  1.MDD, moderately severe  2. NABEEL  Counseled patient on the nature and treatment of these conditions, including role of medications, types of medications, role of therapy and that both together are more effective  Reviewed medication options and will start Prozac 20 mg daily.  I've explained to her that drugs of the SSRI class can have side effects such as weight gain, sexual dysfunction, insomnia, headache, nausea. These medications are generally effective at alleviating symptoms of anxiety and/or depression. Let me know if significant side effects do occur.    3. Oligomenorrhea  Counseled patient regarding effects of weight gain, stressors on menses  Will need further work up including potential evalution for PCOS, ultrasound  Will discuss treatment options further at next visit       Check: Hgb, TSH, Vit D, HgbA1c      Follow-up 1 month pap and med check      "

## 2019-08-29 ASSESSMENT — ANXIETY QUESTIONNAIRES: GAD7 TOTAL SCORE: 13

## 2019-09-02 LAB
DEPRECATED CALCIDIOL+CALCIFEROL SERPL-MC: <37 UG/L (ref 20–75)
VITAMIN D2 SERPL-MCNC: <5 UG/L
VITAMIN D3 SERPL-MCNC: 32 UG/L

## 2019-09-11 PROBLEM — F41.1 GAD (GENERALIZED ANXIETY DISORDER): Status: ACTIVE | Noted: 2019-09-11

## 2019-09-11 PROBLEM — N91.4 SECONDARY OLIGOMENORRHEA: Status: ACTIVE | Noted: 2019-09-11

## 2019-09-11 PROBLEM — F32.1 CURRENT MODERATE EPISODE OF MAJOR DEPRESSIVE DISORDER WITHOUT PRIOR EPISODE (H): Status: ACTIVE | Noted: 2019-09-11

## 2019-09-16 NOTE — RESULT ENCOUNTER NOTE
Dear Angelia,   Here are your recent results which are within the expected range. Please continue with your current plan of care.       Omero Hernandez MD

## 2019-09-27 ENCOUNTER — OFFICE VISIT (OUTPATIENT)
Dept: FAMILY MEDICINE | Facility: CLINIC | Age: 24
End: 2019-09-27
Attending: FAMILY MEDICINE
Payer: COMMERCIAL

## 2019-09-27 VITALS — HEIGHT: 64 IN | WEIGHT: 241.1 LBS | BODY MASS INDEX: 41.16 KG/M2

## 2019-09-27 DIAGNOSIS — F32.4 MAJOR DEPRESSIVE DISORDER WITH SINGLE EPISODE, IN PARTIAL REMISSION (H): ICD-10-CM

## 2019-09-27 DIAGNOSIS — Z78.9 USES BIRTH CONTROL: ICD-10-CM

## 2019-09-27 DIAGNOSIS — Z30.011 ENCOUNTER FOR INITIAL PRESCRIPTION OF CONTRACEPTIVE PILLS: ICD-10-CM

## 2019-09-27 DIAGNOSIS — B37.31 YEAST INFECTION OF THE VAGINA: Primary | ICD-10-CM

## 2019-09-27 DIAGNOSIS — Z12.4 SCREENING FOR CERVICAL CANCER: ICD-10-CM

## 2019-09-27 DIAGNOSIS — Z11.3 ROUTINE SCREENING FOR STI (SEXUALLY TRANSMITTED INFECTION): ICD-10-CM

## 2019-09-27 DIAGNOSIS — F41.1 GAD (GENERALIZED ANXIETY DISORDER): ICD-10-CM

## 2019-09-27 DIAGNOSIS — F32.1 CURRENT MODERATE EPISODE OF MAJOR DEPRESSIVE DISORDER WITHOUT PRIOR EPISODE (H): ICD-10-CM

## 2019-09-27 PROCEDURE — 87591 N.GONORRHOEAE DNA AMP PROB: CPT | Performed by: FAMILY MEDICINE

## 2019-09-27 PROCEDURE — G0463 HOSPITAL OUTPT CLINIC VISIT: HCPCS

## 2019-09-27 PROCEDURE — G0145 SCR C/V CYTO,THINLAYER,RESCR: HCPCS | Performed by: FAMILY MEDICINE

## 2019-09-27 PROCEDURE — 87491 CHLMYD TRACH DNA AMP PROBE: CPT | Performed by: FAMILY MEDICINE

## 2019-09-27 RX ORDER — FLUOXETINE 40 MG/1
40 CAPSULE ORAL DAILY
Qty: 30 CAPSULE | Refills: 1 | Status: SHIPPED | OUTPATIENT
Start: 2019-09-27 | End: 2019-12-02

## 2019-09-27 RX ORDER — FLUCONAZOLE 150 MG/1
150 TABLET ORAL ONCE
Qty: 1 TABLET | Refills: 0 | Status: SHIPPED | OUTPATIENT
Start: 2019-09-27 | End: 2020-02-24

## 2019-09-27 RX ORDER — NORETHINDRONE ACETATE AND ETHINYL ESTRADIOL .03; 1.5 MG/1; MG/1
1 TABLET ORAL DAILY
Qty: 84 TABLET | Refills: 3 | Status: SHIPPED | OUTPATIENT
Start: 2019-09-27 | End: 2020-02-24

## 2019-09-27 ASSESSMENT — ANXIETY QUESTIONNAIRES
GAD7 TOTAL SCORE: 9
2. NOT BEING ABLE TO STOP OR CONTROL WORRYING: SEVERAL DAYS
3. WORRYING TOO MUCH ABOUT DIFFERENT THINGS: SEVERAL DAYS
6. BECOMING EASILY ANNOYED OR IRRITABLE: NEARLY EVERY DAY
7. FEELING AFRAID AS IF SOMETHING AWFUL MIGHT HAPPEN: SEVERAL DAYS
5. BEING SO RESTLESS THAT IT IS HARD TO SIT STILL: NOT AT ALL
1. FEELING NERVOUS, ANXIOUS, OR ON EDGE: NEARLY EVERY DAY

## 2019-09-27 ASSESSMENT — PATIENT HEALTH QUESTIONNAIRE - PHQ9
5. POOR APPETITE OR OVEREATING: NOT AT ALL
SUM OF ALL RESPONSES TO PHQ QUESTIONS 1-9: 13

## 2019-09-27 ASSESSMENT — MIFFLIN-ST. JEOR: SCORE: 1842.59

## 2019-09-27 NOTE — LETTER
2019       RE: Angelia Epstein  [address]  [place] [address]     Dear Colleague,    Thank you for referring your patient, Angelia Epstein, to the [facility] at [facility]. Please see a copy of my visit note below.    HPI  Pt. Here for follow-up MDD, NABEEL    1. She feels somewhat better, crying less, a little bit numb to emotions but not in bad way.   Trouble falling asleep.   Panic attacks--a little better, more able to self manage, vistaril not very helpful  Anxiety less than before, not every day, but still there.     NABEEL-7 SCORE 2019   Total Score 13 9     PHQ-9 SCORE 2019   PHQ-9 Total Score 19 13     No therapist  Hard month--had to put both dog and cat down  No other side effects than trouble falling asleep; notes that even before could feel tired and not be able fall asleep, but will then sleep for long time.     2. Gyne care:  Due for Pap--never had  Currently sexually active, using condoms--had used Ortho Evra patch in opast  1 current partner, 5 partners in the last year  Vaginal symptoms: white discharge with some burning  No STI in past  HPV vaccinated, Hep B vaccinated  HIV tested negative    , no urinary symptoms  No other sexual concerns    Review of Systems     Constitutional:  Negative for fever, chills, weight loss, weight gain, fatigue, decreased appetite, night sweats, recent stressors, height gain, height loss, post-operative complications, incisional pain, hallucinations, increased energy, hyperactivity and confused.   Genitourinary:  Negative for bladder incontinence, dysuria, urgency, hematuria, flank pain, difficulty urinating, nocturia and voiding less frequently.   Psychiatric/Behavioral:  Negative for hallucinations.    Endocrine:  Negative for altered temperature regulation, polyphagia and polydipsia.        Physical Exam  Constitutional:       General: She is not in  "acute distress.     Appearance: Normal appearance.   Neurological:      Mental Status: She is alert.     Speech RRR, mood is anxious, milyd depressed, no psychomotor changes, thought process is appropriate,   Affect is normal. No evidence of suicidality.    (female): normal female external genitalia, vaginal mucosa pink, moist, well rugated and normal cervix, adnexae, and uterus without masses; small amount of thick,curdlike discharge vaginal vault without odor; pap with cytobrush done  Height 1.632 m (5' 4.25\"), weight 109.4 kg (241 lb 1.6 oz).    A/P  1. MDD  2. NABEEL  Partial remission of symptoms; discussed treatment options  Increase to Prozac 40 mg daily  Recommend therapist  If not improving next visit, consider changing medications    3. Contracpeption  4. Screen cervical cancer  5. STI screen  6. Yeast infectoin  Check HIV, RPR, GC     normal external , small amount white, curdy discharge, cervix nl..  Pap, gc, chlamydia done  HIV, RPR  Diflucan 150 mg x1 for yeast  Discussed reduced effective with patch due to pt's weight, had been poor pill taker but taking prozac regualrly    Start Junel 1.5/30 mg daily, instructed in use    Follow-up 1-2 months,           "

## 2019-09-27 NOTE — PROGRESS NOTES
HPI  Pt. Here for follow-up MDD, NABEEL    1. She feels somewhat better, crying less, a little bit numb to emotions but not in bad way.   Trouble falling asleep.   Panic attacks--a little better, more able to self manage, vistaril not very helpful  Anxiety less than before, not every day, but still there.     NABEEL-7 SCORE 2019   Total Score 13 9     PHQ-9 SCORE 2019   PHQ-9 Total Score 19 13       No therapist  Hard month--had to put both dog and cat down  No other side effects than trouble falling asleep; notes that even before could feel tired and not be able fall asleep, but will then sleep for long time.       2. Gyne care:  Due for Pap--never had  Currently sexually active, using condoms--had used Ortho Evra patch in [place]  1 current partner, 5 partners in the last year  Vaginal symptoms: white discharge with some burning  No STI in past  HPV vaccinated, Hep B vaccinated  HIV tested negative  2017  , no urinary symptoms  No other sexual concerns    Review of Systems     Constitutional:  Negative for fever, chills, weight loss, weight gain, fatigue, decreased appetite, night sweats, recent stressors, height gain, height loss, post-operative complications, incisional pain, hallucinations, increased energy, hyperactivity and confused.   Genitourinary:  Negative for bladder incontinence, dysuria, urgency, hematuria, flank pain, difficulty urinating, nocturia and voiding less frequently.   Psychiatric/Behavioral:  Negative for hallucinations.    Endocrine:  Negative for altered temperature regulation, polyphagia and polydipsia.        Physical Exam  Constitutional:       General: She is not in acute distress.     Appearance: Normal appearance.   Neurological:      Mental Status: She is alert.     Speech RRR, mood is anxious, milyd depressed, no psychomotor changes, thought process is appropriate,   Affect is normal. No evidence of suicidality.    (female): normal female external  "genitalia, vaginal mucosa pink, moist, well rugated and normal cervix, adnexae, and uterus without masses; small amount of thick,curdlike discharge vaginal vault without odor; pap with cytobrush done  Height 1.632 m (5' 4.25\"), weight 109.4 kg (241 lb 1.6 oz).    A/P  1. MDD  2. NABEEL  Partial remission of symptoms; discussed treatment options  Increase to Prozac 40 mg daily  Recommend therapist  If not improving next visit, consider changing medications    3. Contracpeption  4. Screen cervical cancer  5. STI screen  6. Yeast infectoin  Check HIV, RPR, GC     normal external , small amount white, curdy discharge, cervix nl..  Pap, gc, chlamydia done  HIV, RPR  Diflucan 150 mg x1 for yeast  Discussed reduced effective with patch due to pt's weight, had been poor pill taker but taking prozac regualrly    Start Junel 1.5/30 mg daily, instructed in use    Follow-up 1-2 months,     "

## 2019-09-28 ASSESSMENT — ANXIETY QUESTIONNAIRES: GAD7 TOTAL SCORE: 9

## 2019-09-29 LAB
C TRACH DNA SPEC QL NAA+PROBE: NEGATIVE
N GONORRHOEA DNA SPEC QL NAA+PROBE: NEGATIVE
SPECIMEN SOURCE: NORMAL
SPECIMEN SOURCE: NORMAL

## 2019-10-02 LAB
COPATH REPORT: ABNORMAL
PAP: ABNORMAL

## 2019-10-09 ASSESSMENT — ENCOUNTER SYMPTOMS
DIFFICULTY URINATING: 0
NIGHT SWEATS: 0
POLYDIPSIA: 0
INCREASED ENERGY: 0
FEVER: 0
DYSURIA: 0
FATIGUE: 0
WEIGHT GAIN: 0
POLYPHAGIA: 0
CHILLS: 0
ALTERED TEMPERATURE REGULATION: 0
HEMATURIA: 0
FLANK PAIN: 0
WEIGHT LOSS: 0
HALLUCINATIONS: 0
DECREASED APPETITE: 0

## 2019-10-11 ENCOUNTER — TELEPHONE (OUTPATIENT)
Dept: FAMILY MEDICINE | Facility: CLINIC | Age: 24
End: 2019-10-11

## 2019-10-11 NOTE — TELEPHONE ENCOUNTER
Called patient with results of PAP--LSIL. Explained the nature of LSIL pap, and natural history in young adults. She was HPV vaccinated. Recommend follow-up Pap in 1 year.

## 2019-12-02 DIAGNOSIS — F32.4 MAJOR DEPRESSIVE DISORDER WITH SINGLE EPISODE, IN PARTIAL REMISSION (H): ICD-10-CM

## 2019-12-02 RX ORDER — FLUOXETINE 40 MG/1
40 CAPSULE ORAL DAILY
Qty: 30 CAPSULE | Refills: 1 | Status: SHIPPED | OUTPATIENT
Start: 2019-12-02 | End: 2020-02-24

## 2019-12-02 NOTE — TELEPHONE ENCOUNTER
Received refill request for fluoxetine. Patient needs follow up appt. Left message for patient to call and schedule. Temporary supply sent.

## 2020-02-24 ENCOUNTER — OFFICE VISIT (OUTPATIENT)
Dept: INTERNAL MEDICINE | Facility: CLINIC | Age: 25
End: 2020-02-24
Attending: INTERNAL MEDICINE
Payer: COMMERCIAL

## 2020-02-24 ENCOUNTER — TELEPHONE (OUTPATIENT)
Dept: GASTROENTEROLOGY | Facility: CLINIC | Age: 25
End: 2020-02-24

## 2020-02-24 VITALS
BODY MASS INDEX: 42.65 KG/M2 | WEIGHT: 256 LBS | DIASTOLIC BLOOD PRESSURE: 86 MMHG | HEART RATE: 88 BPM | SYSTOLIC BLOOD PRESSURE: 119 MMHG | HEIGHT: 65 IN

## 2020-02-24 DIAGNOSIS — M53.3 COCCYDYNIA: ICD-10-CM

## 2020-02-24 DIAGNOSIS — K62.5 RECTAL BLEEDING: ICD-10-CM

## 2020-02-24 DIAGNOSIS — N89.8 VAGINAL DISCHARGE: ICD-10-CM

## 2020-02-24 DIAGNOSIS — F41.1 GAD (GENERALIZED ANXIETY DISORDER): Primary | ICD-10-CM

## 2020-02-24 LAB
SPECIMEN SOURCE: NORMAL
WET PREP SPEC: NORMAL

## 2020-02-24 PROCEDURE — 87210 SMEAR WET MOUNT SALINE/INK: CPT | Performed by: INTERNAL MEDICINE

## 2020-02-24 PROCEDURE — 87591 N.GONORRHOEAE DNA AMP PROB: CPT | Performed by: INTERNAL MEDICINE

## 2020-02-24 PROCEDURE — 87491 CHLMYD TRACH DNA AMP PROBE: CPT | Performed by: INTERNAL MEDICINE

## 2020-02-24 PROCEDURE — G0463 HOSPITAL OUTPT CLINIC VISIT: HCPCS | Mod: ZF

## 2020-02-24 RX ORDER — GABAPENTIN 100 MG/1
100 CAPSULE ORAL 3 TIMES DAILY PRN
Qty: 90 CAPSULE | Refills: 3 | Status: SHIPPED | OUTPATIENT
Start: 2020-02-24 | End: 2021-07-15

## 2020-02-24 RX ORDER — ESCITALOPRAM OXALATE 5 MG/1
5 TABLET ORAL DAILY
Qty: 30 TABLET | Refills: 3 | Status: SHIPPED | OUTPATIENT
Start: 2020-02-24 | End: 2020-07-07

## 2020-02-24 RX ORDER — FLUCONAZOLE 150 MG/1
150 TABLET ORAL ONCE
Qty: 1 TABLET | Refills: 0 | Status: SHIPPED | OUTPATIENT
Start: 2020-02-24 | End: 2020-02-24

## 2020-02-24 ASSESSMENT — ENCOUNTER SYMPTOMS
HEADACHES: 0
ABDOMINAL PAIN: 0
POLYPHAGIA: 0
CONSTIPATION: 0
DECREASED CONCENTRATION: 1
FATIGUE: 0
ALTERED TEMPERATURE REGULATION: 0
SWOLLEN GLANDS: 0
FEVER: 0
RECTAL PAIN: 0
DIARRHEA: 0
HEARTBURN: 0
PANIC: 1
NAUSEA: 0
VOMITING: 0
COUGH: 0
DYSPNEA ON EXERTION: 0
DEPRESSION: 1
POLYDIPSIA: 0
BRUISES/BLEEDS EASILY: 0
INSOMNIA: 0
SINUS CONGESTION: 0
RECTAL BLEEDING: 1
NERVOUS/ANXIOUS: 1

## 2020-02-24 ASSESSMENT — PAIN SCALES - GENERAL: PAINLEVEL: EXTREME PAIN (8)

## 2020-02-24 ASSESSMENT — ANXIETY QUESTIONNAIRES
1. FEELING NERVOUS, ANXIOUS, OR ON EDGE: NEARLY EVERY DAY
2. NOT BEING ABLE TO STOP OR CONTROL WORRYING: MORE THAN HALF THE DAYS
6. BECOMING EASILY ANNOYED OR IRRITABLE: NEARLY EVERY DAY
7. FEELING AFRAID AS IF SOMETHING AWFUL MIGHT HAPPEN: NOT AT ALL
3. WORRYING TOO MUCH ABOUT DIFFERENT THINGS: SEVERAL DAYS
5. BEING SO RESTLESS THAT IT IS HARD TO SIT STILL: NOT AT ALL
GAD7 TOTAL SCORE: 9

## 2020-02-24 ASSESSMENT — PATIENT HEALTH QUESTIONNAIRE - PHQ9
SUM OF ALL RESPONSES TO PHQ QUESTIONS 1-9: 15
5. POOR APPETITE OR OVEREATING: NOT AT ALL

## 2020-02-24 ASSESSMENT — MIFFLIN-ST. JEOR: SCORE: 1917.09

## 2020-02-24 NOTE — NURSING NOTE
Chief Complaint   Patient presents with     Establish Care     C/O tailbone pain   Eladia Plaza LPN

## 2020-02-24 NOTE — LETTER
2/24/2020       RE: Angelia Epstein  [address]  [place] [address]     Dear Colleague,    Thank you for referring your patient, Angelia Epstein, to the [facility]  at [facility]. Please see a copy of my visit note below.    HPI  Patient is here to discuss several issues. She reports that she would like to address tail bone pain, yeast infection and depression.   She reports that she developed significant vaginal itching about 2 weeks ago. She denies significant vaginal discharge. She report vulvar and vaginal pruritis. She is using condoms most of the time. She reports last unprotected intercourse about month and a half ago.   Patient also reports tail bone pain. It developed about 2 months ago. She reports that pain was initially present with sitting, however, more recently pain has been present with walking and standing. She had a pilonidal cyst in the past, worried that she has the same issue. She wants to make sure it is not the same issue. She states that she has tired ibuprofen with no relief. She also has tried some exercises, however, has not had sufficient relief. She reports that she had blood in her stool recently. She denies constipation, was having a bowel movement with blood. She denies pain with defecation.  She denies family history of colon polyps or colon cancer.   Patient reports that she has been diagnosed with depression. She was in therapy in the past. She was on fluoxetine in the past, however, did not feel that the medication has helped. She was on the medication for 2 months. She reports that she also has panic attacks.    Review of Systems     Constitutional:  Negative for fever and fatigue.   HENT:  Negative for hearing loss, dry mouth and sinus congestion.    Eyes:  Negative for decreased vision.   Respiratory:   Negative for cough and dyspnea on exertion.    Cardiovascular:  Negative for chest pain, dyspnea on  exertion and edema.   Gastrointestinal:  Positive for rectal bleeding. Negative for heartburn, nausea, vomiting, abdominal pain, diarrhea, constipation, melena and rectal pain.   Genitourinary:  Positive for vaginal discharge. Negative for dyspareunia and excessive menstruation.   Skin:  Negative for itching and rash.   Neurological:  Negative for headaches.   Endo/Heme:  Negative for anemia, swollen glands and bruises/bleeds easily.   Psychiatric/Behavioral:  Positive for depression, decreased concentration and panic attacks. Negative for mood swings.    Endocrine:  Negative for altered temperature regulation, polyphagia, polydipsia, unwanted hair growth and change in facial hair.    No current outpatient medications on file.     No current facility-administered medications for this visit.      Past Medical History:   Diagnosis Date     NO ACTIVE PROBLEMS      Past Surgical History:   Procedure Laterality Date     CYSTECTOMY PILONIDAL  3/7/2012    Procedure:CYSTECTOMY PILONIDAL; I & D of Pilonidal cyst; Surgeon:ROSARIO GOODE; Location:[facility]     HEAD & NECK SURGERY      tonsilectomy     Family History   Problem Relation Age of Onset     Allergies Father      Allergies Maternal Aunt      Allergies Paternal Aunt      Hypertension Paternal Grandfather      Cerebrovascular Disease Paternal Grandfather      Diabetes Paternal Grandfather      Social History     Socioeconomic History     Marital status: Single     Spouse name: Not on file     Number of children: Not on file     Years of education: Not on file     Highest education level: Not on file   Occupational History     Not on file   Social Needs     Financial resource strain: Not on file     Food insecurity:     Worry: Not on file     Inability: Not on file     Transportation needs:     Medical: Not on file     Non-medical: Not on file   Tobacco Use     Smoking status: Never Smoker     Smokeless tobacco: Never Used   Substance and Sexual Activity     Alcohol  "use: Yes     Comment: socially     Drug use: No     Sexual activity: Yes     Partners: Male     Birth control/protection: Condom   Lifestyle     Physical activity:     Days per week: Not on file     Minutes per session: Not on file     Stress: Not on file   Relationships     Social connections:     Talks on phone: Not on file     Gets together: Not on file     Attends Congregational service: Not on file     Active member of club or organization: Not on file     Attends meetings of clubs or organizations: Not on file     Relationship status: Not on file     Intimate partner violence:     Fear of current or ex partner: Not on file     Emotionally abused: Not on file     Physically abused: Not on file     Forced sexual activity: Not on file   Other Topics Concern     Parent/sibling w/ CABG, MI or angioplasty before 65F 55M? Not Asked   Social History Narrative    FAMILY INFORMATION     Date: 2010    Parent #1      Name: Yamileth Epstein   Gender: female   : 75      Occupation: RN        Parent #2      Name: Melquiades Reich   Gender: Male   : 10/24/72     Occupation: Sales        Siblings:  Name: Lebron    Age: 17yrs    Name: Maryjo    Age: 10yrs    (These sibs live with Dad's girlfriends)        Relationship Status of Parent(s): Mom is single and Dad is partnered with his girlfriend    Who does the child live with? mother    What language(s) is/are spoken at home? English    Child's Country of Birth? [place]                   Vitals:    20 0857   BP: 119/86   Pulse: 88   Weight: 116.1 kg (256 lb)   Height: 1.651 m (5' 5\")         Physical Exam  Vitals signs and nursing note reviewed.   Constitutional:       Appearance: Normal appearance.   HENT:      Head: Normocephalic and atraumatic.      Mouth/Throat:      Mouth: Mucous membranes are dry.   Eyes:      Pupils: Pupils are equal, round, and reactive to light.   Neck:      Musculoskeletal: Normal range of motion and neck supple.   Cardiovascular:      " Rate and Rhythm: Normal rate.   Pulmonary:      Effort: Pulmonary effort is normal.   Abdominal:      General: Abdomen is flat.   Genitourinary:     Exam position: Knee-chest position.      Pubic Area: No pubic lice.       Labia:         Right: Rash present. No lesion or injury.         Left: Rash (bilaterl erythema) present. No lesion or injury.       Urethra: No urethral pain or urethral lesion.      Comments: Tenderness on palpation of coccyx, previous surgery site appears well-healed with no new lesions.   Musculoskeletal: Normal range of motion.         General: No edema.   Skin:     General: Skin is warm and dry.   Neurological:      Mental Status: She is alert.         Assessment and Plan:  Angelia was seen today for establish care.    Diagnoses and all orders for this visit:    NABEEL (generalized anxiety disorder). Discussed options for management of generalized anxiety disorder. Patient was advised to consider counseling. Discussed side effects of antidepressant therapy. Will also add gabapentin at low dose for management of acute anxiety. She will follow up in 2-3 weeks. She is in agreement with the plan.   -     escitalopram (LEXAPRO) 5 MG tablet; Take 1 tablet (5 mg) by mouth daily  -     gabapentin (NEURONTIN) 100 MG capsule; Take 1 capsule (100 mg) by mouth 3 times daily as needed (anxiety)    Rectal bleeding. Discussed causes of rectal bleeding. Recommend colonoscopy given lack of findings on exam  as well as painless bleeding.   -     GASTROENTEROLOGY ADULT REF PROCEDURE ONLY    Vaginal discharge. Discharge is concerning for yeast infection. Wet prep and GC and chlamydia swabs were collected today.  Will treat with a single dose of fluconazole. Patient will be called with results. Recommend evaluation by OB/GYN if symptoms do not improve.   -     Wet Prep (Collected in Clinic)  -     fluconazole (DIFLUCAN) 150 MG tablet; Take 1 tablet (150 mg) by mouth once for 1 dose  -     Chlamydia PCR (Clinic  Collect)  -     Neisseria gonorrhoeae PCR    Coccydynia. Discussed causes of symptoms, recommend PT.   -     [facility] PT, HAND, AND CHIROPRACTIC REFERRAL; Future      Total time spent 45  minutes.  More than 50% of the time spent with Ms. Epstein on counseling / coordinating her care    Antoinette Portillo MD

## 2020-02-24 NOTE — PROGRESS NOTES
HPI  Patient is here to discuss several issues. She reports that she would like to address tail bone pain, yeast infection and depression.   She reports that she developed significant vaginal itching about 2 weeks ago. She denies significant vaginal discharge. She report vulvar and vaginal pruritis. She is using condoms most of the time. She reports last unprotected intercourse about month and a half ago.   Patient also reports tail bone pain. It developed about 2 months ago. She reports that pain was initially present with sitting, however, more recently pain has been present with walking and standing. She had a pilonidal cyst in the past, worried that she has the same issue. She wants to make sure it is not the same issue. She states that she has tired ibuprofen with no relief. She also has tried some exercises, however, has not had sufficient relief. She reports that she had blood in her stool recently. She denies constipation, was having a bowel movement with blood. She denies pain with defecation.  She denies family history of colon polyps or colon cancer.   Patient reports that she has been diagnosed with depression. She was in therapy in the past. She was on fluoxetine in the past, however, did not feel that the medication has helped. She was on the medication for 2 months. She reports that she also has panic attacks.    Review of Systems     Constitutional:  Negative for fever and fatigue.   HENT:  Negative for hearing loss, dry mouth and sinus congestion.    Eyes:  Negative for decreased vision.   Respiratory:   Negative for cough and dyspnea on exertion.    Cardiovascular:  Negative for chest pain, dyspnea on exertion and edema.   Gastrointestinal:  Positive for rectal bleeding. Negative for heartburn, nausea, vomiting, abdominal pain, diarrhea, constipation, melena and rectal pain.   Genitourinary:  Positive for vaginal discharge. Negative for dyspareunia and excessive menstruation.   Skin:  Negative for  itching and rash.   Neurological:  Negative for headaches.   Endo/Heme:  Negative for anemia, swollen glands and bruises/bleeds easily.   Psychiatric/Behavioral:  Positive for depression, decreased concentration and panic attacks. Negative for mood swings.    Endocrine:  Negative for altered temperature regulation, polyphagia, polydipsia, unwanted hair growth and change in facial hair.    No current outpatient medications on file.     No current facility-administered medications for this visit.      Past Medical History:   Diagnosis Date     NO ACTIVE PROBLEMS      Past Surgical History:   Procedure Laterality Date     CYSTECTOMY PILONIDAL  3/7/2012    Procedure:CYSTECTOMY PILONIDAL; I & D of Pilonidal cyst; Surgeon:ROSARIO GOODE; Location:[facility]     HEAD & NECK SURGERY      tonsilectomy     Family History   Problem Relation Age of Onset     Allergies Father      Allergies Maternal Aunt      Allergies Paternal Aunt      Hypertension Paternal Grandfather      Cerebrovascular Disease Paternal Grandfather      Diabetes Paternal Grandfather      Social History     Socioeconomic History     Marital status: Single     Spouse name: Not on file     Number of children: Not on file     Years of education: Not on file     Highest education level: Not on file   Occupational History     Not on file   Social Needs     Financial resource strain: Not on file     Food insecurity:     Worry: Not on file     Inability: Not on file     Transportation needs:     Medical: Not on file     Non-medical: Not on file   Tobacco Use     Smoking status: Never Smoker     Smokeless tobacco: Never Used   Substance and Sexual Activity     Alcohol use: Yes     Comment: socially     Drug use: No     Sexual activity: Yes     Partners: Male     Birth control/protection: Condom   Lifestyle     Physical activity:     Days per week: Not on file     Minutes per session: Not on file     Stress: Not on file   Relationships     Social connections:      "Talks on phone: Not on file     Gets together: Not on file     Attends Quaker service: Not on file     Active member of club or organization: Not on file     Attends meetings of clubs or organizations: Not on file     Relationship status: Not on file     Intimate partner violence:     Fear of current or ex partner: Not on file     Emotionally abused: Not on file     Physically abused: Not on file     Forced sexual activity: Not on file   Other Topics Concern     Parent/sibling w/ CABG, MI or angioplasty before 65F 55M? Not Asked   Social History Narrative    FAMILY INFORMATION     Date: 2010    Parent #1      Name: Yamileth Epstein   Gender: female   : 76      Occupation: RN        Parent #2      Name: Melquiades Reich   Gender: Male   : 10/24/72     Occupation: Sales        Siblings:  Name: Lebron    Age: 17yrs    Name: Maryjo    Age: 10yrs    (These sibs live with Dad's girlfriends)        Relationship Status of Parent(s): Mom is single and Dad is partnered with his girlfriend    Who does the child live with? mother    What language(s) is/are spoken at home? English    Child's Country of Birth? [place]                   Vitals:    20 0857   BP: 119/86   Pulse: 88   Weight: 116.1 kg (256 lb)   Height: 1.651 m (5' 5\")         Physical Exam  Vitals signs and nursing note reviewed.   Constitutional:       Appearance: Normal appearance.   HENT:      Head: Normocephalic and atraumatic.      Mouth/Throat:      Mouth: Mucous membranes are dry.   Eyes:      Pupils: Pupils are equal, round, and reactive to light.   Neck:      Musculoskeletal: Normal range of motion and neck supple.   Cardiovascular:      Rate and Rhythm: Normal rate.   Pulmonary:      Effort: Pulmonary effort is normal.   Abdominal:      General: Abdomen is flat.   Genitourinary:     Exam position: Knee-chest position.      Pubic Area: No pubic lice.       Labia:         Right: Rash present. No lesion or injury.         Left: Rash " (bilaterl erythema) present. No lesion or injury.       Urethra: No urethral pain or urethral lesion.      Comments: Tenderness on palpation of coccyx, previous surgery site appears well-healed with no new lesions.   Musculoskeletal: Normal range of motion.         General: No edema.   Skin:     General: Skin is warm and dry.   Neurological:      Mental Status: She is alert.         Assessment and Plan:  Angelia was seen today for establish care.    Diagnoses and all orders for this visit:    NABEEL (generalized anxiety disorder). Discussed options for management of generalized anxiety disorder. Patient was advised to consider counseling. Discussed side effects of antidepressant therapy. Will also add gabapentin at low dose for management of acute anxiety. She will follow up in 2-3 weeks. She is in agreement with the plan.   -     escitalopram (LEXAPRO) 5 MG tablet; Take 1 tablet (5 mg) by mouth daily  -     gabapentin (NEURONTIN) 100 MG capsule; Take 1 capsule (100 mg) by mouth 3 times daily as needed (anxiety)    Rectal bleeding. Discussed causes of rectal bleeding. Recommend colonoscopy given lack of findings on exam  as well as painless bleeding.   -     GASTROENTEROLOGY ADULT REF PROCEDURE ONLY    Vaginal discharge. Discharge is concerning for yeast infection. Wet prep and GC and chlamydia swabs were collected today.  Will treat with a single dose of fluconazole. Patient will be called with results. Recommend evaluation by OB/GYN if symptoms do not improve.   -     Wet Prep (Collected in Clinic)  -     fluconazole (DIFLUCAN) 150 MG tablet; Take 1 tablet (150 mg) by mouth once for 1 dose  -     Chlamydia PCR (Clinic Collect)  -     Neisseria gonorrhoeae PCR    Coccydynia. Discussed causes of symptoms, recommend PT.   -     [facility] PT, HAND, AND CHIROPRACTIC REFERRAL; Future      Total time spent 45  minutes.  More than 50% of the time spent with Ms. Epstein on counseling / coordinating her care    Antoinette Portillo  MD

## 2020-02-25 ENCOUNTER — TELEPHONE (OUTPATIENT)
Dept: GASTROENTEROLOGY | Facility: CLINIC | Age: 25
End: 2020-02-25

## 2020-02-25 ASSESSMENT — ANXIETY QUESTIONNAIRES: GAD7 TOTAL SCORE: 9

## 2020-02-26 ENCOUNTER — TELEPHONE (OUTPATIENT)
Dept: GASTROENTEROLOGY | Facility: CLINIC | Age: 25
End: 2020-02-26

## 2020-03-01 ENCOUNTER — HEALTH MAINTENANCE LETTER (OUTPATIENT)
Age: 25
End: 2020-03-01

## 2020-04-20 ENCOUNTER — TELEPHONE (OUTPATIENT)
Dept: PHYSICAL THERAPY | Facility: CLINIC | Age: 25
End: 2020-04-20

## 2020-04-20 NOTE — TELEPHONE ENCOUNTER
Pt provided [facility] phone number for scheduling PT. Pt made aware of different appointment options between in clinic and virtual video appointments.

## 2020-04-30 ENCOUNTER — APPOINTMENT (OUTPATIENT)
Dept: ULTRASOUND IMAGING | Facility: CLINIC | Age: 25
End: 2020-04-30
Attending: EMERGENCY MEDICINE
Payer: COMMERCIAL

## 2020-04-30 ENCOUNTER — NURSE TRIAGE (OUTPATIENT)
Dept: NURSING | Facility: CLINIC | Age: 25
End: 2020-04-30

## 2020-04-30 ENCOUNTER — HOSPITAL ENCOUNTER (EMERGENCY)
Facility: CLINIC | Age: 25
Discharge: HOME | End: 2020-04-30
Attending: EMERGENCY MEDICINE | Admitting: EMERGENCY MEDICINE
Payer: COMMERCIAL

## 2020-04-30 VITALS
SYSTOLIC BLOOD PRESSURE: 107 MMHG | OXYGEN SATURATION: 94 % | DIASTOLIC BLOOD PRESSURE: 69 MMHG | RESPIRATION RATE: 14 BRPM | TEMPERATURE: 98.9 F | HEART RATE: 52 BPM

## 2020-04-30 DIAGNOSIS — N20.1 CALCULUS OF URETEROVESICAL JUNCTION (UVJ): ICD-10-CM

## 2020-04-30 LAB
ALBUMIN SERPL-MCNC: 3.9 G/DL (ref 3.4–5)
ALBUMIN UR-MCNC: NEGATIVE MG/DL
ALP SERPL-CCNC: 112 U/L (ref 40–150)
ALT SERPL W P-5'-P-CCNC: 56 U/L (ref 0–50)
ANION GAP SERPL CALCULATED.3IONS-SCNC: 8 MMOL/L (ref 3–14)
APPEARANCE UR: CLEAR
AST SERPL W P-5'-P-CCNC: 28 U/L (ref 0–45)
BASOPHILS # BLD AUTO: 0.1 10E9/L (ref 0–0.2)
BASOPHILS NFR BLD AUTO: 0.4 %
BILIRUB SERPL-MCNC: 0.3 MG/DL (ref 0.2–1.3)
BILIRUB UR QL STRIP: NEGATIVE
BUN SERPL-MCNC: 10 MG/DL (ref 7–30)
CALCIUM SERPL-MCNC: 9 MG/DL (ref 8.5–10.1)
CHLORIDE SERPL-SCNC: 108 MMOL/L (ref 94–109)
CO2 SERPL-SCNC: 21 MMOL/L (ref 20–32)
COLOR UR AUTO: ABNORMAL
CREAT SERPL-MCNC: 0.75 MG/DL (ref 0.52–1.04)
DIFFERENTIAL METHOD BLD: ABNORMAL
EOSINOPHIL # BLD AUTO: 0.2 10E9/L (ref 0–0.7)
EOSINOPHIL NFR BLD AUTO: 1.4 %
ERYTHROCYTE [DISTWIDTH] IN BLOOD BY AUTOMATED COUNT: 12.9 % (ref 10–15)
GFR SERPL CREATININE-BSD FRML MDRD: >90 ML/MIN/{1.73_M2}
GLUCOSE SERPL-MCNC: 148 MG/DL (ref 70–99)
GLUCOSE UR STRIP-MCNC: NEGATIVE MG/DL
HCG SERPL QL: NEGATIVE
HCG UR QL: NEGATIVE
HCT VFR BLD AUTO: 43.4 % (ref 35–47)
HGB BLD-MCNC: 14.4 G/DL (ref 11.7–15.7)
HGB UR QL STRIP: ABNORMAL
IMM GRANULOCYTES # BLD: 0 10E9/L (ref 0–0.4)
IMM GRANULOCYTES NFR BLD: 0.3 %
INTERPRETATION ECG - MUSE: NORMAL
KETONES UR STRIP-MCNC: NEGATIVE MG/DL
LEUKOCYTE ESTERASE UR QL STRIP: NEGATIVE
LYMPHOCYTES # BLD AUTO: 3 10E9/L (ref 0.8–5.3)
LYMPHOCYTES NFR BLD AUTO: 25.6 %
MCH RBC QN AUTO: 29.3 PG (ref 26.5–33)
MCHC RBC AUTO-ENTMCNC: 33.2 G/DL (ref 31.5–36.5)
MCV RBC AUTO: 88 FL (ref 78–100)
MONOCYTES # BLD AUTO: 0.7 10E9/L (ref 0–1.3)
MONOCYTES NFR BLD AUTO: 6.3 %
MUCOUS THREADS #/AREA URNS LPF: PRESENT /LPF
NEUTROPHILS # BLD AUTO: 7.6 10E9/L (ref 1.6–8.3)
NEUTROPHILS NFR BLD AUTO: 66 %
NITRATE UR QL: NEGATIVE
NRBC # BLD AUTO: 0 10*3/UL
NRBC BLD AUTO-RTO: 0 /100
PH UR STRIP: 6 PH (ref 5–7)
PLATELET # BLD AUTO: 386 10E9/L (ref 150–450)
POTASSIUM SERPL-SCNC: 3.8 MMOL/L (ref 3.4–5.3)
PROT SERPL-MCNC: 7.8 G/DL (ref 6.8–8.8)
RBC # BLD AUTO: 4.92 10E12/L (ref 3.8–5.2)
RBC #/AREA URNS AUTO: 49 /HPF (ref 0–2)
SODIUM SERPL-SCNC: 137 MMOL/L (ref 133–144)
SOURCE: ABNORMAL
SP GR UR STRIP: 1.01 (ref 1–1.03)
UROBILINOGEN UR STRIP-MCNC: NORMAL MG/DL (ref 0–2)
WBC # BLD AUTO: 11.6 10E9/L (ref 4–11)
WBC #/AREA URNS AUTO: <1 /HPF (ref 0–5)

## 2020-04-30 PROCEDURE — 99285 EMERGENCY DEPT VISIT HI MDM: CPT | Mod: 25 | Performed by: EMERGENCY MEDICINE

## 2020-04-30 PROCEDURE — 76705 ECHO EXAM OF ABDOMEN: CPT | Mod: 26 | Performed by: EMERGENCY MEDICINE

## 2020-04-30 PROCEDURE — 81025 URINE PREGNANCY TEST: CPT | Performed by: EMERGENCY MEDICINE

## 2020-04-30 PROCEDURE — 93005 ELECTROCARDIOGRAM TRACING: CPT | Mod: 59 | Performed by: EMERGENCY MEDICINE

## 2020-04-30 PROCEDURE — 25000128 H RX IP 250 OP 636: Performed by: EMERGENCY MEDICINE

## 2020-04-30 PROCEDURE — 76770 US EXAM ABDO BACK WALL COMP: CPT

## 2020-04-30 PROCEDURE — 80053 COMPREHEN METABOLIC PANEL: CPT | Performed by: EMERGENCY MEDICINE

## 2020-04-30 PROCEDURE — 96375 TX/PRO/DX INJ NEW DRUG ADDON: CPT | Performed by: EMERGENCY MEDICINE

## 2020-04-30 PROCEDURE — 76830 TRANSVAGINAL US NON-OB: CPT

## 2020-04-30 PROCEDURE — 96361 HYDRATE IV INFUSION ADD-ON: CPT | Performed by: EMERGENCY MEDICINE

## 2020-04-30 PROCEDURE — 96376 TX/PRO/DX INJ SAME DRUG ADON: CPT | Performed by: EMERGENCY MEDICINE

## 2020-04-30 PROCEDURE — 85025 COMPLETE CBC W/AUTO DIFF WBC: CPT | Performed by: EMERGENCY MEDICINE

## 2020-04-30 PROCEDURE — 96374 THER/PROPH/DIAG INJ IV PUSH: CPT | Performed by: EMERGENCY MEDICINE

## 2020-04-30 PROCEDURE — 76775 US EXAM ABDO BACK WALL LIM: CPT | Performed by: EMERGENCY MEDICINE

## 2020-04-30 PROCEDURE — 76705 ECHO EXAM OF ABDOMEN: CPT | Mod: 59 | Performed by: EMERGENCY MEDICINE

## 2020-04-30 PROCEDURE — 93010 ELECTROCARDIOGRAM REPORT: CPT | Mod: 59 | Performed by: EMERGENCY MEDICINE

## 2020-04-30 PROCEDURE — 25800030 ZZH RX IP 258 OP 636: Performed by: EMERGENCY MEDICINE

## 2020-04-30 PROCEDURE — 76775 US EXAM ABDO BACK WALL LIM: CPT | Mod: 26 | Performed by: EMERGENCY MEDICINE

## 2020-04-30 PROCEDURE — 84703 CHORIONIC GONADOTROPIN ASSAY: CPT | Performed by: EMERGENCY MEDICINE

## 2020-04-30 PROCEDURE — 81001 URINALYSIS AUTO W/SCOPE: CPT | Performed by: EMERGENCY MEDICINE

## 2020-04-30 RX ORDER — ONDANSETRON 2 MG/ML
4 INJECTION INTRAMUSCULAR; INTRAVENOUS EVERY 30 MIN PRN
Status: COMPLETED | OUTPATIENT
Start: 2020-04-30 | End: 2020-04-30

## 2020-04-30 RX ORDER — HYDROMORPHONE HYDROCHLORIDE 1 MG/ML
0.5 INJECTION, SOLUTION INTRAMUSCULAR; INTRAVENOUS; SUBCUTANEOUS
Status: COMPLETED | OUTPATIENT
Start: 2020-04-30 | End: 2020-04-30

## 2020-04-30 RX ORDER — PANTOPRAZOLE SODIUM 40 MG/1
40 TABLET, DELAYED RELEASE ORAL ONCE
Status: DISCONTINUED | OUTPATIENT
Start: 2020-04-30 | End: 2020-04-30

## 2020-04-30 RX ADMIN — HYDROMORPHONE HYDROCHLORIDE 0.5 MG: 1 INJECTION, SOLUTION INTRAMUSCULAR; INTRAVENOUS; SUBCUTANEOUS at 11:14

## 2020-04-30 RX ADMIN — ONDANSETRON 4 MG: 2 INJECTION INTRAMUSCULAR; INTRAVENOUS at 11:50

## 2020-04-30 RX ADMIN — HYDROMORPHONE HYDROCHLORIDE 0.5 MG: 1 INJECTION, SOLUTION INTRAMUSCULAR; INTRAVENOUS; SUBCUTANEOUS at 11:47

## 2020-04-30 RX ADMIN — ONDANSETRON 4 MG: 2 INJECTION INTRAMUSCULAR; INTRAVENOUS at 15:44

## 2020-04-30 RX ADMIN — ONDANSETRON 4 MG: 2 INJECTION INTRAMUSCULAR; INTRAVENOUS at 11:09

## 2020-04-30 RX ADMIN — SODIUM CHLORIDE 1000 ML: 9 INJECTION, SOLUTION INTRAVENOUS at 11:44

## 2020-04-30 RX ADMIN — HYDROMORPHONE HYDROCHLORIDE 0.5 MG: 1 INJECTION, SOLUTION INTRAMUSCULAR; INTRAVENOUS; SUBCUTANEOUS at 16:00

## 2020-04-30 ASSESSMENT — ENCOUNTER SYMPTOMS
NAUSEA: 1
BACK PAIN: 1
VOMITING: 1
ABDOMINAL PAIN: 1

## 2020-04-30 NOTE — ED TRIAGE NOTES
Presented with c/o of right groin pain that began this morning around 0800. Unsure if pain radiates to her back or if it began in her back. Reports N/V. Had three episodes of emesis. Voiding WNL. Took 1000mg tylenol.

## 2020-04-30 NOTE — DISCHARGE INSTRUCTIONS
Please make an appointment to follow up with Your Primary Care Provider in 5-7 days.    Strain your urine to collect stone and bring with you to next appointment for testing.     Drink plenty of fluids.     Return to the Emergency department if you develop recurrent pain, burning with urination, fever, or blood in your urine.

## 2020-04-30 NOTE — TELEPHONE ENCOUNTER
Woke up today in sever pain right lower pelvis, and lower right back.  Pee urgency.  Nausea.    Rates pain now 9/10.    No known ovarian cyst or kidney stones recently.    Took two 500mg tylenol amd still having severe pain.    Coming in to ED.    Anna Jesus RN  [facility] Nurse Advisor        Reason for Disposition    SEVERE abdominal pain (e.g., excruciating)    Additional Information    Negative: Passed out (i.e., fainted, collapsed and was not responding)    Negative: Shock suspected (e.g., cold/pale/clammy skin, too weak to stand, low BP, rapid pulse)    Negative: Sounds like a life-threatening emergency to the triager    Negative: Chest pain    Negative: Pain is mainly in upper abdomen (if needed ask: 'is it mainly above the belly button?')    Negative: Abdominal pain and pregnant > 20 weeks    Negative: Abdominal pain and pregnant < 20 weeks    Protocols used: ABDOMINAL PAIN - FEMALE-A-[place]

## 2020-04-30 NOTE — ED PROVIDER NOTES
[place] EMERGENCY DEPARTMENT ([facility])  April 30, 2020  History     Chief Complaint   Patient presents with     Groin Pain     The history is provided by the patient and medical records.     Angelia Epstein is a 24 year old female who presents to the Emergency Department with right groin pain. Patient reports pain began suddenly around 8:30 am this morning. She describes this as a sharp, constant pain that radiates to the back. Patient has also had nausea and vomiting with this. Patient denies history of ovarian cyst or kidney stones. Patient is sexually active with 2 partners in the past months but used condoms. No concern for or history of STI. Denies vaginal discharge or odor. Denies fever, cp, cough, sob, dysuria.    PAST MEDICAL HISTORY:   Past Medical History:   Diagnosis Date     NO ACTIVE PROBLEMS        PAST SURGICAL HISTORY:   Past Surgical History:   Procedure Laterality Date     CYSTECTOMY PILONIDAL  3/7/2012    Procedure:CYSTECTOMY PILONIDAL; I & D of Pilonidal cyst; Surgeon:ROSARIO GOODE; Location:[facility] OR     HEAD & NECK SURGERY      tonsilectomy       Past medical history, past surgical history, medications, and allergies were reviewed with the patient. Additional pertinent items: None    FAMILY HISTORY:   Family History   Problem Relation Age of Onset     Allergies Father      Allergies Maternal Aunt      Allergies Paternal Aunt      Hypertension Paternal Grandfather      Cerebrovascular Disease Paternal Grandfather      Diabetes Paternal Grandfather        SOCIAL HISTORY:   Social History     Tobacco Use     Smoking status: Never Smoker     Smokeless tobacco: Never Used   Substance Use Topics     Alcohol use: Yes     Comment: socially     Social history was reviewed with the patient. Additional pertinent items: None      Discharge Medication List as of 4/30/2020  5:08 PM      CONTINUE these medications which have NOT CHANGED    Details   escitalopram (LEXAPRO) 5 MG tablet Take  1 tablet (5 mg) by mouth daily, Disp-30 tablet, R-3, E-Prescribe      gabapentin (NEURONTIN) 100 MG capsule Take 1 capsule (100 mg) by mouth 3 times daily as needed (anxiety), Disp-90 capsule, R-3, E-Prescribe         STOP taking these medications       fluconazole (DIFLUCAN) 150 MG tablet Comments:   Reason for Stopping:                  Allergies   Allergen Reactions     Cats Rash     Rash, eyes get swollen          Review of Systems   Constitutional: Negative for fever.   Respiratory: Negative for cough and shortness of breath.    Cardiovascular: Negative for chest pain.   Gastrointestinal: Positive for abdominal pain (right, iliac), nausea and vomiting.   Genitourinary: Positive for flank pain, pelvic pain and vaginal pain. Negative for decreased urine volume, difficulty urinating, dysuria, frequency, genital sores, hematuria, menstrual problem, urgency, vaginal bleeding and vaginal discharge.   Musculoskeletal: Positive for back pain.   Skin: Negative for rash.   All other systems reviewed and are negative.    A complete review of systems was performed with pertinent positives and negatives noted in the HPI, and all other systems negative.    Physical Exam   BP: (!) 145/67  Pulse: 58  Heart Rate: (!) 37  Temp: 98.9  F (37.2  C)  Resp: 16  SpO2: 99 %      Physical Exam  Vitals signs and nursing note reviewed.   Constitutional:       General: She is in acute distress.      Appearance: Normal appearance. She is well-developed. She is obese. She is not toxic-appearing or diaphoretic.   HENT:      Head: Normocephalic and atraumatic.      Nose: Nose normal. No rhinorrhea.      Mouth/Throat:      Mouth: Mucous membranes are moist.   Eyes:      General: No scleral icterus.     Conjunctiva/sclera: Conjunctivae normal.   Neck:      Musculoskeletal: Normal range of motion and neck supple. No neck rigidity.   Cardiovascular:      Rate and Rhythm: Normal rate.   Pulmonary:      Effort: Pulmonary effort is normal. No  respiratory distress.      Breath sounds: No stridor.   Abdominal:      General: Abdomen is protuberant. There is no distension.      Palpations: Abdomen is soft. There is no mass.      Tenderness: There is no abdominal tenderness. There is right CVA tenderness. There is no guarding or rebound. Negative signs include Mccormick's sign and McBurney's sign.      Hernia: No hernia is present.   Genitourinary:     General: Normal vulva.   Musculoskeletal: Normal range of motion.         General: No deformity or signs of injury.   Skin:     General: Skin is warm and dry.      Coloration: Skin is not jaundiced or pale.      Findings: No bruising, erythema or rash.   Neurological:      General: No focal deficit present.      Mental Status: She is alert and oriented to person, place, and time.   Psychiatric:         Mood and Affect: Mood normal.         Behavior: Behavior normal.         Thought Content: Thought content normal.         ED Course   10:39 AM  The patient was seen and examined by Samara Childers MD in Room ED18.      Procedures  Results for orders placed during the hospital encounter of 04/30/20   POC US ABDOMEN LIMITED    Impression Bedside FAST (Focused Assessment with Sonography in Trauma), performed and interpreted by me.   Indication: Abdominal/pelvic pain and bradycardia, unknown pregnancy status    With the patient in Trendelenburg, the RUQ, LUQ and subxiphoid views were examined for intraabdominal and thoracic free fluid and pericardial effusion. With the patient in reverse Trendelenburg, the suprapubic view was examined for intraabdominal free fluid. Image quality was satisfactory..     Findings: There is no evidence of free fluid above or below bilateral diaphragms, in the splenorenal or hepatorenal space, or in bilateral paracolic gutters. There was no free fluid seen in the pelvis adjacent to the urinary bladder. There is no free fluid within the pericardium.         IMPRESSION:  Negative FAST     POC US  RETROPERITONEAL LIMITED    Impression Limited Bedside Renal Ultrasound, performed and interpreted by me.    Indication: Flank pain and Abdominal pain  Images of the the right and left kidney were acquired in short and long axis, evaluating for hydronephrosis. A short and long axis of the bladder was evaluated for bladder stones, stones at the ureterovesicular junction and ureteral jets. Image quality was satisfactory..     Findings:  Moderate hydronephrosis of the right kidney, left kidney with no hydronephrosis.    No urinary bladder stones seen.    Bilateral urinary jets present. Diminished on right.     IMPRESSION: Right hydronephrosis as noted above.               EKG Interpretation:      Interpreted by Samara Childers MD  Time reviewed:11:46   Symptoms at time of EKG: sinus bradycardia with sinus arrythmia  Rhythm: bradycardia  Rate: Bradycardia and 40-50  Axis: Normal  Ectopy: None  Conduction: Normal  ST Segments/ T Waves: No ST-T wave changes and No acute ischemic changes  Q Waves: None  Comparison to prior: No old EKG available    Clinical Impression: sinus bradycardia                     Labs Ordered and Resulted from Time of ED Arrival Up to the Time of Departure from the ED   CBC WITH PLATELETS DIFFERENTIAL - Abnormal; Notable for the following components:       Result Value    WBC 11.6 (*)     All other components within normal limits   COMPREHENSIVE METABOLIC PANEL - Abnormal; Notable for the following components:    Glucose 148 (*)     ALT 56 (*)     All other components within normal limits   UA MACROSCOPIC WITH REFLEX TO MICRO AND CULTURE - Abnormal; Notable for the following components:    Blood Urine Moderate (*)     RBC Urine 49 (*)     Mucous Urine Present (*)     All other components within normal limits   HCG QUALITATIVE URINE   HCG QUALITATIVE         Medications   0.9% sodium chloride BOLUS (0 mLs Intravenous Stopped 4/30/20 3441)   ondansetron (ZOFRAN) injection 4 mg (4 mg Intravenous Given  4/30/20 1544)   HYDROmorphone (PF) (DILAUDID) injection 0.5 mg (0.5 mg Intravenous Given 4/30/20 1600)             Assessments & Plan (with Medical Decision Making)   Angelia Epstein is a 24 year old female who presents to the Emergency Department with right groin pain.    Ddx: UVJ stone, ovarian torsion, ruptured ovarian cyst, ectopic pregnancy, pyelonephritis, appendicitis    Patient without abdominal tenderness over the area of pain.  Suggestive of retroperitoneal location of the pain.  She has CVA tenderness on the right.  No infectious symptoms such as cough, shortness of breath, chest pain, dysuria, fever.  Acutely distressed from the pain.  Given Dilaudid, Zofran, IV fluids.  Actively retching and vomiting in the room.  Patient developed significant sinus bradycardia.  It is unclear if this correlated with the dosing of Dilaudid.  I am more suspicious that this is a vasovagal response to pelvic pain.  FAST exam negative for intra-abdominal free fluid, pelvic free fluid.  There is moderate right sided hydronephrosis.  Urinary bladder is decompressed with bilateral urinary jets although decreased on the right.  Unable to visualize patient's ovaries or uterus on the transabdominal exam.  Despite bradycardia, patient remains normotensive and minimally symptomatic aside from severe pain.  She was sent to ultrasound with the nurse for supervision.  No indication to give atropine as patient is not hemodynamically unstable.  EKG shows sinus bradycardia.  No interval abnormalities.    UPT neg. TVUS neg for torsion or other abnormality. Renal US shows 6 mm stone in bladder with mild right hydro. On reevaluation, patient's pain resolved. I suspect patient has passed UVJ stone since arrival. Discussed with patient that I anticipate her pain will not return unless there is a remaining ureteral stone that was not visualized on US. She was observed in the ED for a period while awaiting UA. UA neg for infection. No  recurrent severe pain during monitoring so discussed that I did not think she warranted CT scan or discharge with rx for narcotic pain medications. I advised her to continue po hydration, straining urine, and return if she develops recurrent severe flank pain. Would obtain CT scan at that point to eval for remaining stone not visible on US. Mild pain may be related to ureteral spasm and can be treated with ibuprofen. Patient verbalized understanding and felt comfortable with plan for discharge home. Return precautions provided.       I have reviewed the nursing notes.    I have reviewed the findings, diagnosis, plan and need for follow up with the patient.    Discharge Medication List as of 4/30/2020  5:08 PM          Final diagnoses:   Calculus of ureterovesical junction (UVJ)     I, Sonam Maher, am serving as a trained medical scribe to document services personally performed by Samara Childers MD, based on the provider's statements to me.      ISamara MD, was physically present and have reviewed and verified the accuracy of this note documented by Sonam Maher.    4/30/2020   [facility], [place], EMERGENCY DEPARTMENT     Samara Childers MD  05/01/20 2014       Samara Childers MD  05/01/20 2022

## 2020-04-30 NOTE — TELEPHONE ENCOUNTER
Caller said she was instructed to go to and Urgent Care by a nurse she'd just spoken to. She said the nurse just hung up instead setting her up with an urgent are visit.    I read the previous nurse's note and UC was not intended. ER is where Angelia was instructed to go.  She said she must have misunderstood.    I asked Angelia if she is afraid to go to and ER.  She stated she is not. She said she wants to go to an urgent care instead of an ER because she is in a lot of pain and doesn't want to go to ER because she'll be seen quicker if she goes to uc.    I told Angelia that since Covid 19 started the hospital ER's are a lot less busy than pre-Covid-19.  She said she'll go to [facility] ER    COVID 19 Nurse Triage Plan/Patient Instructions    Please be aware that novel coronavirus (COVID-19) may be circulating in the community. If you develop symptoms such as fever, cough, or SOB or if you have concerns about the presence of another infection including coronavirus (COVID-19), please contact your health care provider or visit [email or web address].     Disposition/Instructions    Patient to go to ED and follow protocol based instructions. Follow System Ambulatory Workflow for COVID 19.     Bring Your Own Device:  Please also bring your smart device(s) (smart phones, tablets, laptops) and their charging cables for your personal use and to communicate with your care team during your visit.    Thank you for limiting contact with others, wearing a simple mask to cover your cough, practice good hand hygiene habits and accessing our virtual services where possible to limit the spread of this virus.    For more information about COVID19 and options for caring for yourself at home, please visit the CDC website at https://www.cdc.gov/coronavirus/2019-ncov/about/steps-when-sick.html  For more options for care at [facility], please visit our website at [email or web address]    For more information, please  use the [place] Department of Health COVID-19 Website: https://www.health.[facility].[place]/diseases/coronavirus/index.html  [place] Department of Health ([facility]) COVID-19 Hotlines (Interpreters available):      Health questions: Phone Number: [phone] or 1-972.451.9149 and Hours: 7 a.m. to 7 p.m.    Schools and  questions: Phone Number: [phone] or [phone] and Hours 7 a.m. to 7 p.m.      Additional Information    Negative: Nursing judgment    Negative: Nursing judgment    Negative: Nursing judgment    Negative: Nursing judgment    Negative: Information only question and nurse able to answer    Protocols used: NO PROTOCOL AVAILABLE - INFORMATION ONLY-A-[place]    Toya AVALOS RN [place] Nurse Advisors

## 2020-04-30 NOTE — ED AVS SNAPSHOT
[facility], [place], Emergency Department  [address] [place] [id number]  Phone:  [phone]                                    Angelia Epstein   MRN: [id number]    Department:  [facility], Emergency Department   Date of Visit:  4/30/2020           After Visit Summary Signature Page    I have received my discharge instructions, and my questions have been answered. I have discussed any challenges I see with this plan with the nurse or doctor.    ..........................................................................................................................................  Patient/Patient Representative Signature      ..........................................................................................................................................  Patient Representative Print Name and Relationship to Patient    ..................................................               ................................................  Date                                   Time    ..........................................................................................................................................  Reviewed by Signature/Title    ...................................................              ..............................................  Date                                               Time          22EPIC Rev 08/18

## 2020-05-01 ASSESSMENT — ENCOUNTER SYMPTOMS
DYSURIA: 0
FEVER: 0
HEMATURIA: 0
COUGH: 0
SHORTNESS OF BREATH: 0
FREQUENCY: 0
FLANK PAIN: 1
DIFFICULTY URINATING: 0

## 2020-06-27 ENCOUNTER — NURSE TRIAGE (OUTPATIENT)
Dept: NURSING | Facility: CLINIC | Age: 25
End: 2020-06-27

## 2020-06-27 ENCOUNTER — VIRTUAL VISIT (OUTPATIENT)
Dept: FAMILY MEDICINE | Facility: OTHER | Age: 25
End: 2020-06-27

## 2020-06-28 NOTE — PROGRESS NOTES
"Date: 2020 21:50:21  Clinician: [place]  Clinician NPI: [id number]  Patient: Angelia Epstein  Patient : 1995  Patient Address: [address] [place] [address]  Patient Phone: [phone]  Visit Protocol: URI  Patient Summary:  Angelia is a 24 year old ( : 1995 ) female who initiated a Visit for COVID-19 (Coronavirus) evaluation and screening. When asked the question \"Please sign me up to receive news, health information and promotions from [employer].\", Angelia responded \"No\".    Angelia states her symptoms started 1-2 days ago.   Her symptoms consist of ageusia, diarrhea, rhinitis, malaise, a headache, anosmia, facial pain or pressure, and nasal congestion.   Symptom details     Nasal secretions: The color of her mucus is clear.    Facial pain or pressure: The facial pain or pressure feels worse when bending over or leaning forward.     Headache: She states the headache is moderate (4-6 on a 10 point pain scale).      Angelia denies having wheezing, nausea, teeth pain, vomiting, ear pain, chills, sore throat, enlarged lymph nodes, myalgias, fever, and cough. She also denies having recent facial or sinus surgery in the past 60 days and taking antibiotic medication in the past month. She is not experiencing dyspnea.    Pertinent COVID-19 (Coronavirus) information  In the past 14 days, Angelia has not worked in a congregate living setting.   She does not work or volunteer as healthcare worker or a  and does not work or volunteer in a healthcare facility.   Angelia also has not lived in a congregate living setting in the past 14 days. She lives with a healthcare worker.   Angelia has not had a close contact with a laboratory-confirmed COVID-19 patient within 14 days of symptom onset.   Pertinent medical history  Angelia typically gets a yeast infection when she takes antibiotics. She has used fluconazole (Diflucan) to treat previous yeast infections. 2 doses of " fluconazole (Diflucan) has typically been needed for symptoms to resolve in the past.  Angelia needs a return to work/school note.   Weight: 246 lbs   Angelia does not smoke or use smokeless tobacco.   She is not sure if she is pregnant and denies breastfeeding. Her last period was over a month ago.   Weight: 246 lbs    MEDICATIONS: gabapentin oral, escitalopram oxalate oral, Zyrtec-D oral, ALLERGIES: NKDA  Clinician Response:  Dear Angelia,     Your symptoms show that you may have coronavirus (COVID-19). This illness can cause fever, cough and trouble breathing. Many people get a mild case and get better on their own. Some people can get very sick.  What should I do?  We would like to test you for this virus.   1. Please call [phone] to schedule your visit. Explain that you were referred by [facility] to have a COVID-19 test. Be ready to share [facility].  The following will serve as your written order for this COVID Test, ordered by me, for the indication of suspected COVID [Z20.828]: The test will be ordered in [facility], our electronic health record, after you are scheduled. It will show as ordered and authorized by Edu Aden MD.  Order: COVID-19 (Coronavirus) PCR for SYMPTOMATIC testing from [facility].      2. When it's time for your COVID test:  Stay at least 6 feet away from others. (If someone will drive you to your test, stay in the backseat, as far away from the  as you can.)   Cover your mouth and nose with a mask, tissue or washcloth.  Go straight to the testing site. Don't make any stops on the way there or back.      3.Starting now: Stay home and away from others (self-isolate) until:   You've had no fever---and no medicine that reduces fever---for 3 full days (72 hours). And...   Your other symptoms have gotten better. For example, your cough or breathing has improved. And...   At least 10 days have passed since your symptoms started.       During this time, don't leave the house  "except for testing or medical care.   Stay in your own room, even for meals. Use your own bathroom if you can.   Stay away from others in your home. No hugging, kissing or shaking hands. No visitors.  Don't go to work, school or anywhere else.    Clean \"high touch\" surfaces often (doorknobs, counters, handles, etc.). Use a household cleaning spray or wipes. You'll find a full list of  on the EPA website: www.epa.gov/pesticide-registration/list-n-disinfectants-use-against-sars-cov-2.   Cover your mouth and nose with a mask, tissue or washcloth to avoid spreading germs.  Wash your hands and face often. Use soap and water.  Caregivers in these groups are at risk for severe illness due to COVID-19:  o People 65 years and older  o People who live in a nursing home or long-term care facility  o People with chronic disease (lung, heart, cancer, diabetes, kidney, liver, immunologic)  o People who have a weakened immune system, including those who:   Are in cancer treatment  Take medicine that weakens the immune system, such as corticosteroids  Had a bone marrow or organ transplant  Have an immune deficiency  Have poorly controlled HIV or AIDS  Are obese (body mass index of 40 or higher)  Smoke regularly   o Caregivers should wear gloves while washing dishes, handling laundry and cleaning bedrooms and bathrooms.  o Use caution when washing and drying laundry: Don't shake dirty laundry, and use the warmest water setting that you can.  o For more tips, go to www.cdc.gov/coronavirus/2019-ncov/downloads/10Things.pdf.      How can I take care of myself?   Get lots of rest. Drink extra fluids (unless a doctor has told you not to).   Take Tylenol (acetaminophen) for fever or pain. If you have liver or kidney problems, ask your family doctor if it's okay to take Tylenol.   Adults can take either:    650 mg (two 325 mg pills) every 4 to 6 hours, or...   1,000 mg (two 500 mg pills) every 8 hours as needed.    Note: Don't take " more than 3,000 mg in one day. Acetaminophen is found in many medicines (both prescribed and over-the-counter medicines). Read all labels to be sure you don't take too much.   For children, check the Tylenol bottle for the right dose. The dose is based on the child's age or weight.    If you have other health problems (like cancer, heart failure, an organ transplant or severe kidney disease): Call your specialty clinic if you don't feel better in the next 2 days.       Know when to call 911. Emergency warning signs include:    Trouble breathing or shortness of breath Pain or pressure in the chest that doesn't go away Feeling confused like you haven't felt before, or not being able to wake up Bluish-colored lips or face.  Where can I get more information?   [facility] -- About COVID-19: [email or web address]   CDC -- What to Do If You're Sick: www.cdc.gov/coronavirus/2019-ncov/about/steps-when-sick.html   CDC -- Ending Home Isolation: www.cdc.gov/coronavirus/2019-ncov/hcp/disposition-in-home-patients.html   CDC -- Caring for Someone: www.cdc.gov/coronavirus/2019-ncov/if-you-are-sick/care-for-someone.html   [facility] -- Interim Guidance for Hospital Discharge to Home: www.health.[facility].[place].us/diseases/coronavirus/hcp/hospdischarge.pdf   [facility] clinical trials (COVID-19 research studies): clinicalaffairs.[facility]-clinical-trials    Below are the COVID-19 hotlines at the [facility] ([facility]). Interpreters are available.    For health questions: Call 968-018-1876 or 1-994.577.5643 (7 a.m. to 7 p.m.) For questions about schools and childcare: Call 261-423-2465 or 1-111.693.6535 (7 a.m. to 7 p.m.)    Diagnosis: Other malaise  Diagnosis ICD: R53.81

## 2020-06-28 NOTE — TELEPHONE ENCOUNTER
Angelia called in with symptoms of covid of loss of taste and headache. She would like to get tested asap as she has a wedding to attend in the next week.  Discussed options for testing.  She is going to try [facility] first.      Reason for Disposition    [1] COVID-19 infection suspected by caller or triager AND [2] mild symptoms (cough, fever, or others) AND [3] no complications or SOB    Additional Information    Negative: [1] COVID-19 exposure AND [2] no symptoms    Negative: COVID-19 and Breastfeeding, questions about    Negative: [1] Adult with possible COVID-19 symptoms AND [2] triager concerned about severity of symptoms or other causes    Negative: SEVERE or constant chest pain or pressure (Exception: mild central chest pain, present only when coughing)    Negative: MODERATE difficulty breathing (e.g., speaks in phrases, SOB even at rest, pulse 100-120)    Negative: Patient sounds very sick or weak to the triager    Negative: MILD difficulty breathing (e.g., minimal/no SOB at rest, SOB with walking, pulse <100)    Negative: Chest pain or pressure    Negative: Fever > 103 F (39.4 C)    Negative: [1] Fever > 101 F (38.3 C) AND [2] age > 60    Negative: [1] Fever > 100.0 F (37.8 C) AND [2] bedridden (e.g., nursing home patient, CVA, chronic illness, recovering from surgery)    Negative: HIGH RISK patient (e.g., age > 64 years, diabetes, heart or lung disease, weak immune system)    Negative: Fever present > 3 days (72 hours)    Negative: [1] Fever returns after gone for over 24 hours AND [2] symptoms worse or not improved    Negative: [1] Continuous (nonstop) coughing interferes with work or school AND [2] no improvement using cough treatment per protocol     [facility] Specific Disposition  - [facility] Specific Patient Instructions  COVID 19 Nurse Triage Plan/Patient Instructions    Please be aware that novel coronavirus (COVID-19) may be circulating in the community. If you develop symptoms  such as fever, cough, or SOB or if you have concerns about the presence of another infection including coronavirus (COVID-19), please contact your health care provider or visit [email or web address].       Patient to schedule a Virtual Visit with provider. Reference Visit Selection Guide.    Thank you for taking steps to prevent the spread of this virus.  Limit your contact with others.  Wear a simple mask to cover your cough.  Wash your hands well and often.    Resources  [facility]: About COVID-19: [email or web address]/  CDC: What to Do If You're Sick: www.cdc.gov/coronavirus/2019-ncov/about/steps-when-sick.html  CDC: Ending Home Isolation: www.cdc.gov/coronavirus/2019-ncov/hcp/disposition-in-home-patients.html   CDC: Caring for Someone: www.cdc.gov/coronavirus/2019-ncov/if-you-are-sick/care-for-someone.html   [facility]: Interim Guidance for Hospital Discharge to Home: www.[facility].mn./diseases/coronavirus/hcp/hospdischarge.pdf  [facility] clinical trials (COVID-19 research studies): clinicalaffairs.[facility]-clinical-trials   Below are the COVID-19 hotlines at the [place] [facility] ([facility]). Interpreters are available.   For health questions: Call 745-545-7830 or 1-454.849.2725 (7 a.m. to 7 p.m.)  For questions about schools and childcare: Call 452-669-5985 or 1-227.852.7995 (7 a.m. to 7 p.m.)    Protocols used: CORONAVIRUS (COVID-19) DIAGNOSED OR [id number]-NX 5.16.20

## 2020-07-07 DIAGNOSIS — F41.1 GAD (GENERALIZED ANXIETY DISORDER): ICD-10-CM

## 2020-07-07 RX ORDER — ESCITALOPRAM OXALATE 5 MG/1
5 TABLET ORAL DAILY
Qty: 30 TABLET | Refills: 1 | Status: SHIPPED | OUTPATIENT
Start: 2020-07-07 | End: 2022-01-18

## 2020-07-07 NOTE — TELEPHONE ENCOUNTER
Refill request for escitalopram. Pt last in clinic in Feb with plan to return in 2-3 week, then COVID. Will send 30 day supply and inform pt needs televisit for med check before further refills

## 2020-08-20 ENCOUNTER — TELEPHONE (OUTPATIENT)
Dept: ENDOCRINOLOGY | Facility: CLINIC | Age: 25
End: 2020-08-20

## 2020-08-24 ENCOUNTER — TELEPHONE (OUTPATIENT)
Dept: ENDOCRINOLOGY | Facility: CLINIC | Age: 25
End: 2020-08-24

## 2020-09-04 ENCOUNTER — TELEPHONE (OUTPATIENT)
Dept: ENDOCRINOLOGY | Facility: CLINIC | Age: 25
End: 2020-09-04

## 2020-09-04 NOTE — TELEPHONE ENCOUNTER
Called attempting to inform patient to complete questionnaires on [employer] prior to visit. Mailbox is full.  Chelsi Valdez, EMT

## 2020-09-08 ENCOUNTER — VIRTUAL VISIT (OUTPATIENT)
Dept: ENDOCRINOLOGY | Facility: CLINIC | Age: 25
End: 2020-09-08
Payer: COMMERCIAL

## 2020-09-08 DIAGNOSIS — Z91.199 NO-SHOW FOR APPOINTMENT: Primary | ICD-10-CM

## 2020-09-08 NOTE — LETTER
9/8/2020       RE: Angelia Epstein  [address]  [place] [address]     Dear Colleague,    Thank you for referring your patient, Angelia Epstein, to the [facility] at [facility]. Please see a copy of my visit note below.        No show. SARAI Vasquez CNP     Again, thank you for allowing me to participate in the care of your patient.      Sincerely,    Kati Alves NP

## 2020-09-08 NOTE — LETTER
Date:September 9, 2020      Provider requested that no letter be sent. Do not send.       [facility] Health Information

## 2020-09-17 ENCOUNTER — TELEPHONE (OUTPATIENT)
Dept: ENDOCRINOLOGY | Facility: CLINIC | Age: 25
End: 2020-09-17

## 2020-09-17 NOTE — PROGRESS NOTES
"Angelia Epstein is a 24 year old female who is being evaluated via a billable video visit.      The patient has been notified of following:     \"This video visit will be conducted via a call between you and your physician/provider. We have found that certain health care needs can be provided without the need for an in-person physical exam.  This service lets us provide the care you need with a video conversation.  If a prescription is necessary we can send it directly to your pharmacy.  If lab work is needed we can place an order for that and you can then stop by our lab to have the test done at a later time.    Video visits are billed at different rates depending on your insurance coverage.  Please reach out to your insurance provider with any questions.    If during the course of the call the physician/provider feels a video visit is not appropriate, you will not be charged for this service.\"    Patient has given verbal consent for Video visit? Yes  How would you like to obtain your AVS? MyChart  Will anyone else be joining your video visit? No        Video-Visit Details    Type of service:  Video Visit    Video Start Time: 11:27 AM  Video End Time: 12:01 PM  Time Spent with Patient: 34 minutes    Originating Location (pt. Location): Home    Distant Location (provider location):  [facility]     Platform used for Video Visit: [employer]    During this virtual visit the patient is located in [place], patient verifies this as the location during the entirety of this visit.    New Weight Management Nutrition Consultation    Angelia Epstein is a 24 year old female presents today for new weight management nutrition consultation.  Patient referred by Dr. Alarcon on September 18, 2020.    Patient with Co-morbidities of obesity including:  Type II DM no  Renal Failure no  Sleep apnea yes  Hypertension no   Dyslipidemia no  Joint pain no  Back pain yes  GERD no     Anthropometrics:  Estimated body mass " "index is 42.07 kg/m  as calculated from the following:    Height as of an earlier encounter on 9/18/20: 1.632 m (5' 4.25\").    Weight as of an earlier encounter on 9/18/20: 112 kg (247 lb).    Medications for Weight Loss:  None at this time    NUTRITION HISTORY  See MD note for details.  NKFA. Does not tolerate dairy very well, but does not avoid.   Had done keto in the past, for 2 weeks, lost 1 lbs per day, was not able to sustain.   No current vit/min supplement intake.   Started new job working mornings 3 days per week, otherwise sleeping in. Other job starts at 3:30 pm. Works as a  3 nights per week.   Wakes to intense cravings in the middle of the night.  Food choice highly driven by cravings. Wants to focus on changes that help reduce cravings.   Wants to tray to not eat after 7pm.   Sleep: varies between too little and too much. During quarantine sleeping 4am-3pm. Now tries to go to sleep at 11-12 pm to wake at 7am, but often not able to fall asleep untiln 1-2 am.    Cravings: greasy, high-fat foods.     Recent food recall:  Breakfast: skipping or eggs  Lunch: 2 pm - smoothie (greens + frozen fruit (<1/2) + NF vanilla yogurt + flax seeds); [place] (white bread + turkey/ham + berry + cheese + lettuce); Frozen meal (Uncrustable PB and J; Mini Pizza)  Dinner: 9-10 pm Fast-food ([employer], Pasta, Chicken sandwich, Burger)   Snacks: Once per week waking in the middle night very hungry - will have a PBJ, string cheese of granola bar, crackers (Ritz), almonds    Beverages: Water, Gatorade zero  Alcohol: 1-3 drinks - Vodka soda, Proseco or red wine. Having 5-6 on weekends    Dining out: 5-6 times per week.     Physical Activity:  \"No PA\" had set a goal [place], was able to do 1-2 times per week.     MALNUTRITION  % Intake: No decreased intake noted  % Weight Loss: None noted  Subcutaneous Fat Loss: None observed  Muscle Loss: None observed  Fluid Accumulation/Edema: " None noted  Malnutrition Diagnosis: Patient does not meet two of the established criteria necessary for diagnosing malnutrition    Nutrition Prescription  Recommended energy/nutrient modification.  1200 calories/day (per MD)    Nutrition Diagnosis  Obesity r/t long history of self-monitoring deficit and excessive energy intake aeb BMI >30.    Nutrition Intervention  Materials/education provided on 1200 calorie/day diet, Volumetric eating to help satiety level on fewer calories; portion control and healthy food choices (Plate Method and Volumetrics handouts), 100 calorie snack choices, meal and snack planning and mindful eating. Co-developed goals focused on reducing cravings and providing more structure to meal pattern. Sent list of goals and handouts to pt via "[employer].     Patient Understanding: good  Expected Compliance: good  Follow-Up Plans: Meal planning      Nutrition Goals  1) Follow 1200 calorie/day plan  2) Eat 3 meals per day   - Take some time to plan meals in advanced  3) Reduce alcohol intake. Limit intake to weekends.   4) Aim for 7 hours of sleep per night    The Plate Method  [email or web address]    Protein Sources for Weight Loss  [email or web address]     Carbohydrates  [email or web address]     Mindful Eating  [email or web address]     Summary of Volumetrics Eating Plan  [email or web address]       Follow-Up:  ADELSO Hayward, RD, LD

## 2020-09-18 ENCOUNTER — VIRTUAL VISIT (OUTPATIENT)
Dept: SURGERY | Facility: CLINIC | Age: 25
End: 2020-09-18
Payer: COMMERCIAL

## 2020-09-18 ENCOUNTER — VIRTUAL VISIT (OUTPATIENT)
Dept: ENDOCRINOLOGY | Facility: CLINIC | Age: 25
End: 2020-09-18
Payer: COMMERCIAL

## 2020-09-18 VITALS — HEIGHT: 64 IN | WEIGHT: 247 LBS | BODY MASS INDEX: 42.17 KG/M2

## 2020-09-18 DIAGNOSIS — E66.01 MORBID OBESITY (H): Primary | ICD-10-CM

## 2020-09-18 DIAGNOSIS — R63.2 BINGE EATING: ICD-10-CM

## 2020-09-18 DIAGNOSIS — Z71.3 NUTRITIONAL COUNSELING: Primary | ICD-10-CM

## 2020-09-18 DIAGNOSIS — F10.20 ALCOHOLISM (H): ICD-10-CM

## 2020-09-18 DIAGNOSIS — E66.9 OBESITY: ICD-10-CM

## 2020-09-18 RX ORDER — NALTREXONE HYDROCHLORIDE 50 MG/1
TABLET, FILM COATED ORAL
Qty: 30 TABLET | Refills: 3 | Status: SHIPPED | OUTPATIENT
Start: 2020-09-18 | End: 2022-01-18

## 2020-09-18 ASSESSMENT — MIFFLIN-ST. JEOR: SCORE: 1864.35

## 2020-09-18 ASSESSMENT — PAIN SCALES - GENERAL: PAINLEVEL: NO PAIN (0)

## 2020-09-18 NOTE — LETTER
"2020       RE: Angelia Epstein  [address]  [place] [address]     Dear Colleague,    Thank you for referring your patient, Angelia Epstein, to the [facility] MEDICAL WEIGHT MANAGEMENT at [facility]. Please see a copy of my visit note below.    Angelia Epstein is a 24 year old female who is being evaluated via a billable video visit.      The patient has been notified of following:     \"This video visit will be conducted via a call between you and your physician/provider. We have found that certain health care needs can be provided without the need for an in-person physical exam.  This service lets us provide the care you need with a video conversation.  If a prescription is necessary we can send it directly to your pharmacy.  If lab work is needed we can place an order for that and you can then stop by our lab to have the test done at a later time.    Video visits are billed at different rates depending on your insurance coverage.  Please reach out to your insurance provider with any questions.    If during the course of the call the physician/provider feels a video visit is not appropriate, you will not be charged for this service.\"    Patient has given verbal consent for Video visit? Yes  How would you like to obtain your AVS? MyChart  If you are dropped from the video visit, the video invite should be resent to: Send to e-mail at: [email or web address]  Will anyone else be joining your video visit? No   During this virtual visit the patient is located in [place], patient verifies this as the location during the entirety of this visit.     New Medical Weight Management Consult    PATIENT:  Angelia Epstein  MRN:         [id number]  :         1995  RE:         2020    Dear Colleagues,    I had the pleasure of seeing your patient, Angelia Epstein. Full intake/assessment was done to determine barriers to weight loss success and develop a " "treatment plan. Angelia Epstein is a 24 year old female interested in treatment of medical problems associated with excess weight. She has a height of 5' 4.25\", a weight of 247 lbs 0 oz, and the calculated Body mass index is 42.07 kg/m .    ASSESSMENT/PLAN:  Morbid Obesity in a young person who also has some binge eating and frequent alcohol consumption. She is motivated to make behavioral changes to promote weight loss. She is also taking a medication for panic attacks, gabapentin, that can be associated with weight loss. She could benefit from consideration of another strategy for management of her panic attacks.    Patient Instructions:  Nice to talk with you today in virtual clinic.    Neurontin (gabapentin) is associated with fatigue, weight gain. Please minimize your use of this medication and/or try to stop taking it. Perhaps Xanax maybe more helpful for your panic attacks - talk to your treatment team if you are having frequent panic attacks.    After stopping gabapentin, for your eating disorder and to help reduce alcohol intake, please start taking Naltrexone 50 mg tabs daily, Take it one to two hours prior to worst cravings. Start with 1/2 tab and if tolerated, on 3rd day, increase to full 50 mg tab.    Cut down on alcohol intake to 0 to help support your weight loss goals.    1,200 calorie meal plan to lose 1 lbs weekly without exercise (based on REE calc of 1,860)  Use meal replacements such as Maryellen's meals, Lean Cuisines, Healthy Choice, Smart Ones, Weight Watchers Meals, and Slim Fast and Glucerna shakes and supplement with fresh fruits and vegetables  Please drink a lot of water daily. Most people typically need about 2 liters of water daily and more if they are exercising, have a large weight, or have a fever or illness. Add Crystal Light for flavoring if desired. But no pop with calories in it.  Please keep a food journal of what you eat, calories in what you eat  Consider using applications for " smart phones such as InHiroPal, KSKT, Corevalus SystemsRecipes, LoseIt, Tap&Track, and RelaxM.  Focus on wet volumetrics, meaning, eat more foods that are high in water and fiber such as fruits and vegetables in order to get that full feeling. These are also good for your overall health as well.  Check out Dr. Mansi Hernandez from [facility] - she has cookbooks with low calorie volumetric recipes  You can try Let's Dish to help you prepare meals for you and your family. Often times, the caloric and nutrition data and serving sizes are available for this food. This can be a time saving maneuver. The website can give you more information http://www.[employer]/  Check out Hello Fresh at https://www.[employer]tap.[employer]/food-boxes/classic-box/  Try Cooking Light recipes for low calorie meal preparation and planning  Other food plan options you can search for on the internet and check out include: Ronda HARTMANN, [facility]    Start walking program working up to 4 miles daily and home exercises using videos and home equipment.    Please consider health psychologist to discuss how mood, depression and/or anxiety impact your eating.    Psychological Providers    Check with your insurance to see if the psychologist is covered, if not, ask your insurance company for a referral.    [facility]   Henrry Mcadams, PhD, LP   [phone]  Josephine Rey, PhD, LP  [phone]  Irene Martinez, PhD                 [phone]    LeeDara Lara, Keviny,LP             [phone]    [place]  Danelle Teixeira, PhD, LP  [phone]      [facility]:           [phone]  We send a referral and patient is notified.    [facility]:  [phone]  Maryellen Laureano PsyD, [facility]  Nydia Foreman PSyD, LP         [phone]    [facility]   To schedule, please call member services on the back of your card.    ROSIE Funez,  "PsyD,LP,ABPP [phone]    [place]  Jeremias Costa MA, [phone]    [place]  Nhung Boone, PsyD, [phone]    [place]  Venice Anali, PsyD, [phone]  (fibromyalgia issues)    ZELDA Mak  [phone]    [place]  Leelee Whitfield,PsyD,LP  [phone]  Melquiades Craig, PhD, LP  [phone]  Melquiades Machado, PhD, LP             [phone]    [place]  Ryan Maloney , [facility], LMFT [phone]    [place]  Suellen Ly, PsyD, [phone]    ZELDA Blanco, KevinyD, LP   [phone]     [place]      [phone]   Anaid Vaughn MA, LP  - ext. [phone]  Melquiades Pelayo, PhD, LP - ext [phone]  Kevin Dotson, LP - ext [phone]    [place]  Petar Dubose, PhD, [phone]  Casey Villar PsyD, [phone]          [place]  Rena Landaverde, PhD, [phone]    [place]mar García Ps, LP  [phone]            Call updates to CORTEZ Nazario    [phone]  Last updated: 2019      RTC: 2 months    Best Wishes,  Dr. Taisha Alarcon MD, MPH  Endocrinologist      She has the following co-morbidities:       2020   I have the following health issues associated with obesity: Sleep Apnea   I have the following symptoms associated with obesity: Depression, Lower Extremity Swelling, Back Pain, Fatigue, Groin Rash, Irregular Menstral Cycle       Patient Goals 2020   I am interested in having a healthier weight to diminish current health problems: Yes   I am interested in having a healthier weight in order to prevent future health problems: Yes   I am interested in having a healthier weight in order to have a future surgery: No       Referring Provider 2020   Please name the provider who referred you to Medical Weight Management.  If you do not know, please answer: \"I Don't Know\". I Don't Know       Weight History 2020   How concerned are you about your weight? Very Concerned   Would you " describe your weight gain as gradual? Yes   I became overweight: In College   The following factors have contributed to my weight gain:  Mental Health Issues, Eating Wrong Types of Food, Eating Too Much, Lack of Exercise, Stress   I have tried the following methods to lose weight: Watching Portions or Calories, Atkins-type Diet (Low Carb/High Protein)   My lowest weight since age 19 was: 170   My highest weight since age 19 was: 250   The most weight I have ever lost was: (lbs) 30   I have the following family history of obesity/being overweight:  My mother is overweight, My father is overweight   Has anyone in your family had weight loss surgery? No   How has your weight changed over the last year?  Gained   How many pounds? 30       Diet Recall Review with Patient 9/18/2020   Do you typically eat breakfast? No   If you do eat breakfast, what types of food do you eat? Smoothies or eggs   Do you typically eat lunch? Yes   If you do eat lunch, what types of food do you typically eat?  sandwiches, frozen food   Do you typically eat supper? Yes   If you do eat supper, what types of food do you typically eat? fast food   Do you typically eat snacks? Yes   If you do snack, what types of food do you typically eat? almonds, string cheese, beef jerky, granola bars   Do you like vegetables?  Yes   Do you drink water? Yes   How many glasses of juice do you drink in a typical day? 0   How many of glasses of milk do you drink in a typical day? 1   If you do drink milk, what type? 2%   How many 8oz glasses of sugar containing drinks such as Brenden-Aid/sweet tea do you drink in a day? 0   How many cans/bottles of sugar pop/soda/tea/sports drinks do you drink in a day? 0   How many cans/bottles of diet pop/soda/tea or sports drink do you drink in a day? 2   How often do you have a drink of alcohol? 4 or More Times a Week   If you do drink, how many drinks might you have in a day? 3-4       Eating Habits 9/18/2020   Generally, my  meals include foods like these: bread, pasta, rice, potatoes, corn, crackers, sweet dessert, pop, or juice. Almost Everyday   Generally, my meals include foods like these: fried meats, brats, burgers, french fries, pizza, cheese, chips, or ice cream. Almost Everyday   Eat fast food (like McDonalds, [employer] Aj, ShoeSize.Me Bell). A Few Times a Week   Eat at a buffet or sit-down restaurant. Once a Week   Eat most of my meals in front of the TV or computer. A Few Times a Week   Often skip meals, eat at random times, have no regular eating times. Everyday   Rarely sit down for a meal but snack or graze throughout.  Less Than Weekly   Eat extra snacks between meals. A Few Times a Week   Eat most of my food at the end of the day. Almost Everyday   Eat in the middle of the night or wake up at night to eat. Almost Everyday   Eat extra snacks to prevent or correct low blood sugar. Never   Eat to prevent acid reflux or stomach pain. Never   Worry about not having enough food to eat. Never   Have you been to the food shelf at least a few times this year? No   I eat when I am depressed. Almost Everyday   I eat when I am stressed. A Few Times a Week   I eat when I am bored. Almost Everyday   I eat when I am anxious. Never   I eat when I am happy or as a reward. A Few Times a Week   I feel hungry all the time even if I just have eaten. Less Than Weekly   Feeling full is important to me. Once a Week   I finish all the food on my plate even if I am already full. A Few Times a Week   I can't resist eating delicious food or walk past the good food/smell. Almost Everyday   I eat/snack without noticing that I am eating. A Few Times a Week   I eat when I am preparing the meal. Less Than Weekly   I eat more than usual when I see others eating. Almost Everyday   I have trouble not eating sweets, ice cream, cookies, or chips if they are around the house. Everyday   I think about food all day. Almost Everyday   What foods, if any, do you crave?  Chips/Crackers   Please list any other foods you crave? I crave greasy foods typically or meals more than snacks       Amount of Food 9/18/2020   I make myself vomit what I have eaten or use laxatives to get rid of food. Never   I eat a large amount of food, like a loaf of bread, a box of cookies, a pint/quart of ice cream, all at once. Monthly   I eat a large amount of food even when I am not hungry. Weekly   I eat rapidly. Monthly   I eat alone because I feel embarrassed and do not want others to see how much I have eaten. Never   I eat until I am uncomfortably full. Weekly   I feel bad, disgusted, or guilty after I overeat. Almost Everyday   I make myself vomit what I have eaten or use laxatives to get rid of food. Never       Activity/Exercise History 9/18/2020   How much of a typical 12 hour day do you spend sitting? Half the Day   How much of a typical 12 hour day do you spend lying down? Half the Day   How much of a typical day do you spend walking/standing? Less Than Half the Day   How many hours (not including work) do you spend on the TV/Video Games/Computer/Tablet/Phone? 6 Hours or More   How many times a week are you active for the purpose of exercise? Once a Week   What keeps you from being more active? Shortness of Breath, Too tired   How many total minutes do you spend doing some activity for the purpose of exercising when you exercise? More Than 30 Minutes       PAST MEDICAL HISTORY:  Past Medical History:   Diagnosis Date     NO ACTIVE PROBLEMS        Work/Social History Reviewed With Patient 9/18/2020   My employment status is: Full-Time, Student   My job is: [employer], Student   How much of your job is spent on the computer or phone? Less Than 50%   How many hours do you spend commuting to work daily?  20 mins there 20 mins back   What is your marital status? Single   If in a relationship, is your significant other overweight? N/A   Do you have children? No   If you have children, are they  overweight? N/A   Who do you live with?  Mother   Are they supportive of your health goals? Yes   Who does the food shopping?  Me       Mental Health History Reviewed With Patient 9/18/2020   Have you ever been physically or sexually abused? No   If yes, do you feel that the abuse is affecting your weight? N/A   If yes, would you like to talk to a counselor about the abuse? N/A   How often in the past 2 weeks have you felt little interest or pleasure in doing things? More Than Half the Days   Over the past 2 weeks how often have you felt down, depressed, or hopeless? More Than Half the Days       Sleep History Reviewed With Patient 9/18/2020   How many hours do you sleep at night? 6   Do you think that you snore loudly or has anybody ever heard you snore loudly (louder than talking or so loud it can be heard behind a shut door)? No   Has anyone seen or heard you stop breathing during your sleep? No   Do you often feel tired, fatigued, or sleepy during the day? Yes   Do you have a TV/Computer in your bedroom? Yes       MEDICATIONS:   Current Outpatient Medications   Medication Sig Dispense Refill     escitalopram (LEXAPRO) 5 MG tablet Take 1 tablet (5 mg) by mouth daily 30 tablet 1     gabapentin (NEURONTIN) 100 MG capsule Take 1 capsule (100 mg) by mouth 3 times daily as needed (anxiety) 90 capsule 3       ALLERGIES:   Allergies   Allergen Reactions     Cats Rash     Rash, eyes get swollen         PHYSICAL EXAM:  There were no vitals taken for this virtual visit.  Gen: well appearing, nad, pleasant and conversant  HEENT: anicteric, EOMI, no proptosis or lid lag, no goiter  Neuro: A&O    FOLLOW-UP:   8 weeks.    TIME: 30 min spent on evaluation, management, counseling, education, & motivational interviewing with greater than 50 % of the total time was spent on counseling and coordinating care    Sincerely,    BRANDY CHERY MD, MPH  Staff Endocrinologist      Video-Visit Details    Type of service:  Video  Visit    Video Start Time: Start: 09/18/2020 08:31 am   Stop: 09/18/2020 08:46 am    Originating Location (pt. Location): Home    Distant Location (provider location):  [facility] WEIGHT MANAGEMENT     Platform used for Video Visit: Dante

## 2020-09-18 NOTE — NURSING NOTE
"Chief Complaint   Patient presents with     Consult     New appointment.       Vitals:    09/18/20 0738   Weight: 112 kg (247 lb)   Height: 1.632 m (5' 4.25\")       Body mass index is 42.07 kg/m .                            GIAN GATES, EMT    "

## 2020-09-18 NOTE — PROGRESS NOTES
"Angelia Epstein is a 24 year old female who is being evaluated via a billable video visit.      The patient has been notified of following:     \"This video visit will be conducted via a call between you and your physician/provider. We have found that certain health care needs can be provided without the need for an in-person physical exam.  This service lets us provide the care you need with a video conversation.  If a prescription is necessary we can send it directly to your pharmacy.  If lab work is needed we can place an order for that and you can then stop by our lab to have the test done at a later time.    Video visits are billed at different rates depending on your insurance coverage.  Please reach out to your insurance provider with any questions.    If during the course of the call the physician/provider feels a video visit is not appropriate, you will not be charged for this service.\"    Patient has given verbal consent for Video visit? Yes  How would you like to obtain your AVS? MyChart  If you are dropped from the video visit, the video invite should be resent to: Send to e-mail at: [email or web address]  Will anyone else be joining your video visit? No   During this virtual visit the patient is located in [place], patient verifies this as the location during the entirety of this visit.     New Medical Weight Management Consult    PATIENT:  Angelia Epstein  MRN:         [id number]  :         1995  RE:         2020    Dear Colleagues,    I had the pleasure of seeing your patient, Angelia Epstein. Full intake/assessment was done to determine barriers to weight loss success and develop a treatment plan. Angelia Epstein is a 24 year old female interested in treatment of medical problems associated with excess weight. She has a height of 5' 4.25\", a weight of 247 lbs 0 oz, and the calculated Body mass index is 42.07 kg/m .    ASSESSMENT/PLAN:  Morbid Obesity in a young person who also " has some binge eating and frequent alcohol consumption. She is motivated to make behavioral changes to promote weight loss. She is also taking a medication for panic attacks, gabapentin, that can be associated with weight loss. She could benefit from consideration of another strategy for management of her panic attacks.    Patient Instructions:  Nice to talk with you today in virtual clinic.    Neurontin (gabapentin) is associated with fatigue, weight gain. Please minimize your use of this medication and/or try to stop taking it. Perhaps Xanax maybe more helpful for your panic attacks - talk to your treatment team if you are having frequent panic attacks.    After stopping gabapentin, for your eating disorder and to help reduce alcohol intake, please start taking Naltrexone 50 mg tabs daily, Take it one to two hours prior to worst cravings. Start with 1/2 tab and if tolerated, on 3rd day, increase to full 50 mg tab.    Cut down on alcohol intake to 0 to help support your weight loss goals.    1,200 calorie meal plan to lose 1 lbs weekly without exercise (based on REE calc of 1,860)  Use meal replacements such as Maryellen's meals, Lean Cuisines, Healthy Choice, Smart Ones, Weight Watchers Meals, and Slim Fast and Glucerna shakes and supplement with fresh fruits and vegetables  Please drink a lot of water daily. Most people typically need about 2 liters of water daily and more if they are exercising, have a large weight, or have a fever or illness. Add Crystal Light for flavoring if desired. But no pop with calories in it.  Please keep a food journal of what you eat, calories in what you eat  Consider using applications for smart phones such as "Tunespotter[employer].", Hybrid Energy Solutions, Prexa PharmaceuticalsReciFision, LoseIt, Tap&Track, and RelaxM.  Focus on wet volumetrics, meaning, eat more foods that are high in water and fiber such as fruits and vegetables in order to get that full feeling. These are also good for your overall health as well.  Check  out Dr. Mansi Hernandez from [facility] - she has cookbooks with low calorie volumetric recipes  You can try Let's Dish to help you prepare meals for you and your family. Often times, the caloric and nutrition data and serving sizes are available for this food. This can be a time saving maneuver. The website can give you more information http://www.imgScrimmage/  Check out Hello Fresh at https://www.Candid i[employer] Apple [employer].com/food-boxes/classic-box/  Try Cooking Light recipes for low calorie meal preparation and planning  Other food plan options you can search for on the internet and check out include: Ronda HARTMANN, [facility]    Start walking program working up to 4 miles daily and home exercises using videos and home equipment.    Please consider health psychologist to discuss how mood, depression and/or anxiety impact your eating.    Psychological Providers    Check with your insurance to see if the psychologist is covered, if not, ask your insurance company for a referral.    [facility]   Henrry Mcadams, PhD, LP   [phone]  Josephine Rey, PhD, LP  [phone]  Irene Martinez, PhD                 [phone]    Luis Lara, [place],LP             [phone]    [place]  Danelle Teixeira, PhD, LP  [phone]      [facility]:           [phone]  We send a referral and patient is notified.    [facility]:  [phone]  Maryellen Laureano PsyD, LP    [place]  Nydia Foreman PSyD, LP         [phone]    [facility]   To schedule, please call member services on the back of your card.    ROSIE Funez PsyD,LP,[facility] [phone]    [place]  Jeremias Costa MA, [phone]    [place]  Nhung Shook PsyD, [phone]    [place]  Venice Briones PsyD, [phone]  (fibromyalgia issues)    ZELDA Mak  [phone]    [place]  Leelee Whitfield PsyD,LP   [phone]  Melquiades Craig, PhD, LP  [phone]  Melquiades Machado, PhD, LP             [phone]    [place]  Ryan Vázquezrom , [facility], [phone]    [place] Ken Malachi, PsyD, [phone]    ZELDA Blanco, PsyD, LP   [phone]     [place]      Outreach [phone]   Anaid Vaughn MA, LP  - ext. [phone]  Melquiades Pelayo, PhD, LP - ext [phone]  Shen Santamaria, PsyD, LP - ext [phone]    [place]  Petar Dubose, PhD, [phone]  Casey Villar PsyD, [phone]          [place]  Rena Landaverde, PhD, [phone]    [place] Garc[place], Ps, LP  [phone]            Call updates to CORTEZ Nazario    [phone]  Last updated: 2019      RTC: 2 months    Dr. Taisha Bajwa MD, MPH  Endocrinologist      She has the following co-morbidities:       2020   I have the following health issues associated with obesity: Sleep Apnea   I have the following symptoms associated with obesity: Depression, Lower Extremity Swelling, Back Pain, Fatigue, Groin Rash, Irregular Menstral Cycle       Patient Goals 2020   I am interested in having a healthier weight to diminish current health problems: Yes   I am interested in having a healthier weight in order to prevent future health problems: Yes   I am interested in having a healthier weight in order to have a future surgery: No       Referring Provider 2020   Please name the provider who referred you to Medical Weight Management.  If you do not know, please answer: \"I Don't Know\". I Don't Know       Weight History 2020   How concerned are you about your weight? Very Concerned   Would you describe your weight gain as gradual? Yes   I became overweight: In College   The following factors have contributed to my weight gain:  Mental Health Issues, Eating Wrong Types of Food, Eating Too Much, Lack of Exercise, Stress   I have tried the following methods to lose weight: Watching Portions or " Calories, Atkins-type Diet (Low Carb/High Protein)   My lowest weight since age 19 was: 170   My highest weight since age 19 was: 250   The most weight I have ever lost was: (lbs) 30   I have the following family history of obesity/being overweight:  My mother is overweight, My father is overweight   Has anyone in your family had weight loss surgery? No   How has your weight changed over the last year?  Gained   How many pounds? 30       Diet Recall Review with Patient 9/18/2020   Do you typically eat breakfast? No   If you do eat breakfast, what types of food do you eat? Smoothies or eggs   Do you typically eat lunch? Yes   If you do eat lunch, what types of food do you typically eat?  sandwiches, frozen food   Do you typically eat supper? Yes   If you do eat supper, what types of food do you typically eat? fast food   Do you typically eat snacks? Yes   If you do snack, what types of food do you typically eat? almonds, string cheese, beef jerky, granola bars   Do you like vegetables?  Yes   Do you drink water? Yes   How many glasses of juice do you drink in a typical day? 0   How many of glasses of milk do you drink in a typical day? 1   If you do drink milk, what type? 2%   How many 8oz glasses of sugar containing drinks such as Brenden-Aid/sweet tea do you drink in a day? 0   How many cans/bottles of sugar pop/soda/tea/sports drinks do you drink in a day? 0   How many cans/bottles of diet pop/soda/tea or sports drink do you drink in a day? 2   How often do you have a drink of alcohol? 4 or More Times a Week   If you do drink, how many drinks might you have in a day? 3-4       Eating Habits 9/18/2020   Generally, my meals include foods like these: bread, pasta, rice, potatoes, corn, crackers, sweet dessert, pop, or juice. Almost Everyday   Generally, my meals include foods like these: fried meats, brats, burgers, french fries, pizza, cheese, chips, or ice cream. Almost Everyday   Eat fast food (like McDonalds, Burger  Aj, Taco Bell). A Few Times a Week   Eat at a buffet or sit-down restaurant. Once a Week   Eat most of my meals in front of the TV or computer. A Few Times a Week   Often skip meals, eat at random times, have no regular eating times. Everyday   Rarely sit down for a meal but snack or graze throughout.  Less Than Weekly   Eat extra snacks between meals. A Few Times a Week   Eat most of my food at the end of the day. Almost Everyday   Eat in the middle of the night or wake up at night to eat. Almost Everyday   Eat extra snacks to prevent or correct low blood sugar. Never   Eat to prevent acid reflux or stomach pain. Never   Worry about not having enough food to eat. Never   Have you been to the food shelf at least a few times this year? No   I eat when I am depressed. Almost Everyday   I eat when I am stressed. A Few Times a Week   I eat when I am bored. Almost Everyday   I eat when I am anxious. Never   I eat when I am happy or as a reward. A Few Times a Week   I feel hungry all the time even if I just have eaten. Less Than Weekly   Feeling full is important to me. Once a Week   I finish all the food on my plate even if I am already full. A Few Times a Week   I can't resist eating delicious food or walk past the good food/smell. Almost Everyday   I eat/snack without noticing that I am eating. A Few Times a Week   I eat when I am preparing the meal. Less Than Weekly   I eat more than usual when I see others eating. Almost Everyday   I have trouble not eating sweets, ice cream, cookies, or chips if they are around the house. Everyday   I think about food all day. Almost Everyday   What foods, if any, do you crave? Chips/Crackers   Please list any other foods you crave? I crave greasy foods typically or meals more than snacks       Amount of Food 9/18/2020   I make myself vomit what I have eaten or use laxatives to get rid of food. Never   I eat a large amount of food, like a loaf of bread, a box of cookies, a  pint/quart of ice cream, all at once. Monthly   I eat a large amount of food even when I am not hungry. Weekly   I eat rapidly. Monthly   I eat alone because I feel embarrassed and do not want others to see how much I have eaten. Never   I eat until I am uncomfortably full. Weekly   I feel bad, disgusted, or guilty after I overeat. Almost Everyday   I make myself vomit what I have eaten or use laxatives to get rid of food. Never       Activity/Exercise History 9/18/2020   How much of a typical 12 hour day do you spend sitting? Half the Day   How much of a typical 12 hour day do you spend lying down? Half the Day   How much of a typical day do you spend walking/standing? Less Than Half the Day   How many hours (not including work) do you spend on the TV/Video Games/Computer/Tablet/Phone? 6 Hours or More   How many times a week are you active for the purpose of exercise? Once a Week   What keeps you from being more active? Shortness of Breath, Too tired   How many total minutes do you spend doing some activity for the purpose of exercising when you exercise? More Than 30 Minutes       PAST MEDICAL HISTORY:  Past Medical History:   Diagnosis Date     NO ACTIVE PROBLEMS        Work/Social History Reviewed With Patient 9/18/2020   My employment status is: Full-Time, Student   My job is: [employer], Student   How much of your job is spent on the computer or phone? Less Than 50%   How many hours do you spend commuting to work daily?  20 mins there 20 mins back   What is your marital status? Single   If in a relationship, is your significant other overweight? N/A   Do you have children? No   If you have children, are they overweight? N/A   Who do you live with?  Mother   Are they supportive of your health goals? Yes   Who does the food shopping?  Me       Mental Health History Reviewed With Patient 9/18/2020   Have you ever been physically or sexually abused? No   If yes, do you feel that the abuse is affecting your  weight? N/A   If yes, would you like to talk to a counselor about the abuse? N/A   How often in the past 2 weeks have you felt little interest or pleasure in doing things? More Than Half the Days   Over the past 2 weeks how often have you felt down, depressed, or hopeless? More Than Half the Days       Sleep History Reviewed With Patient 9/18/2020   How many hours do you sleep at night? 6   Do you think that you snore loudly or has anybody ever heard you snore loudly (louder than talking or so loud it can be heard behind a shut door)? No   Has anyone seen or heard you stop breathing during your sleep? No   Do you often feel tired, fatigued, or sleepy during the day? Yes   Do you have a TV/Computer in your bedroom? Yes       MEDICATIONS:   Current Outpatient Medications   Medication Sig Dispense Refill     escitalopram (LEXAPRO) 5 MG tablet Take 1 tablet (5 mg) by mouth daily 30 tablet 1     gabapentin (NEURONTIN) 100 MG capsule Take 1 capsule (100 mg) by mouth 3 times daily as needed (anxiety) 90 capsule 3       ALLERGIES:   Allergies   Allergen Reactions     Cats Rash     Rash, eyes get swollen         PHYSICAL EXAM:  There were no vitals taken for this virtual visit.  Gen: well appearing, nad, pleasant and conversant  HEENT: anicteric, EOMI, no proptosis or lid lag, no goiter  Neuro: A&O    FOLLOW-UP:   8 weeks.    TIME: 30 min spent on evaluation, management, counseling, education, & motivational interviewing with greater than 50 % of the total time was spent on counseling and coordinating care    Sincerely,    BRANDY CHERY MD, MPH  Staff Endocrinologist      Video-Visit Details    Type of service:  Video Visit    Video Start Time: Start: 09/18/2020 08:31 am   Stop: 09/18/2020 08:46 am    Originating Location (pt. Location): Home    Distant Location (provider location):  [facility]     Platform used for Video Visit: [employer]

## 2020-09-18 NOTE — LETTER
"9/18/2020       RE: Angelia Epstein  [address]  [place] [address]     Dear Colleague,    Thank you for referring your patient, Angelia Epstein, to the [facility] at [facility]. Please see a copy of my visit note below.    Angelia Epstein is a 24 year old female who is being evaluated via a billable video visit.      The patient has been notified of following:     \"This video visit will be conducted via a call between you and your physician/provider. We have found that certain health care needs can be provided without the need for an in-person physical exam.  This service lets us provide the care you need with a video conversation.  If a prescription is necessary we can send it directly to your pharmacy.  If lab work is needed we can place an order for that and you can then stop by our lab to have the test done at a later time.    Video visits are billed at different rates depending on your insurance coverage.  Please reach out to your insurance provider with any questions.    If during the course of the call the physician/provider feels a video visit is not appropriate, you will not be charged for this service.\"    Patient has given verbal consent for Video visit? Yes  How would you like to obtain your AVS? MyChart  Will anyone else be joining your video visit? No        Video-Visit Details    Type of service:  Video Visit    Video Start Time: 11:27 AM  Video End Time: 12:01 PM  Time Spent with Patient: 34 minutes    Originating Location (pt. Location): Home    Distant Location (provider location):  [facility]     Platform used for Video Visit: Shoop    During this virtual visit the patient is located in [place], patient verifies this as the location during the entirety of this visit.    New Weight Management Nutrition Consultation    Angelia Epstein is a 24 year old female presents today for new weight management " "nutrition consultation.  Patient referred by Dr. Alarcon on September 18, 2020.    Patient with Co-morbidities of obesity including:  Type II DM no  Renal Failure no  Sleep apnea yes  Hypertension no   Dyslipidemia no  Joint pain no  Back pain yes  GERD no     Anthropometrics:  Estimated body mass index is 42.07 kg/m  as calculated from the following:    Height as of an earlier encounter on 9/18/20: 1.632 m (5' 4.25\").    Weight as of an earlier encounter on 9/18/20: 112 kg (247 lb).    Medications for Weight Loss:  None at this time    NUTRITION HISTORY  See MD note for details.  NKFA. Does not tolerate dairy very well, but does not avoid.   Had done keto in the past, for 2 weeks, lost 1 lbs per day, was not able to sustain.   No current vit/min supplement intake.   Started new job working mornings 3 days per week, otherwise sleeping in. Other job starts at 3:30 pm. Works as a  3 nights per week.   Wakes to intense cravings in the middle of the night.  Food choice highly driven by cravings. Wants to focus on changes that help reduce cravings.   Wants to tray to not eat after 7pm.   Sleep: varies between too little and too much. During quarantine sleeping 4am-3pm. Now tries to go to sleep at 11-12 pm to wake at 7am, but often not able to fall asleep untiln 1-2 am.    Cravings: greasy, high-fat foods.     Recent food recall:  Breakfast: skipping or eggs  Lunch: 2 pm - smoothie (greens + frozen fruit (<1/2) + NF vanilla yogurt + flax seeds); [place] (white bread + turkey/ham + berry + cheese + lettuce); Frozen meal (Uncrustable PB and J; Mini Pizza)  Dinner: 9-10 pm Fast-food (McDonalds, Pasta, Chicken sandwich, Burger)   Snacks: Once per week waking in the middle night very hungry - will have a PBJ, string cheese of granola bar, crackers (Ritz), almonds    Beverages: Water, Gatorade zero  Alcohol: 1-3 drinks - Vodka soda, Proseco or red wine. Having 5-6 on weekends    Dining out: 5-6 times per week. " "    Physical Activity:  \"No PA\" had set a goal [place], was able to do 1-2 times per week.     MALNUTRITION  % Intake: No decreased intake noted  % Weight Loss: None noted  Subcutaneous Fat Loss: None observed  Muscle Loss: None observed  Fluid Accumulation/Edema: None noted  Malnutrition Diagnosis: Patient does not meet two of the established criteria necessary for diagnosing malnutrition    Nutrition Prescription  Recommended energy/nutrient modification.  1200 calories/day (per MD)    Nutrition Diagnosis  Obesity r/t long history of self-monitoring deficit and excessive energy intake aeb BMI >30.    Nutrition Intervention  Materials/education provided on 1200 calorie/day diet, Volumetric eating to help satiety level on fewer calories; portion control and healthy food choices (Plate Method and Volumetrics handouts), 100 calorie snack choices, meal and snack planning and mindful eating. Co-developed goals focused on reducing cravings and providing more structure to meal pattern. Sent list of goals and handouts to pt via [employer]ia.     Patient Understanding: good  Expected Compliance: good  Follow-Up Plans: Meal planning      Nutrition Goals  1) Follow 1200 calorie/day plan  2) Eat 3 meals per day   - Take some time to plan meals in advanced  3) Reduce alcohol intake. Limit intake to weekends.   4) Aim for 7 hours of sleep per night    The Plate Method  http://www.[email or web address]    Protein Sources for Weight Loss  [email or web address]     Carbohydrates  [email or web address]     Mindful Eating  [email or web address]     Summary of Volumetrics Eating Plan  [email or web address]       Follow-Up:  ADELSO Hayward RD, LD                Again, thank you for allowing me to participate in the care of your patient.      Sincerely,    Tania Hayward RD      "

## 2020-09-18 NOTE — LETTER
"9/18/2020       RE: Angelia Epstein  [address]  [place] [address]     Dear Colleague,    Thank you for referring your patient, Angelia Epstein, to the [facility] SURGICAL WEIGHT MANAGEMENT at [facility]. Please see a copy of my visit note below.    New Weight Management Nutrition Consultation    Angelia Epstein is a 24 year old female presents today for new weight management nutrition consultation.  Patient referred by Dr. Alarcon on September 18, 2020.    Patient with Co-morbidities of obesity including:  Type II DM no  Renal Failure no  Sleep apnea yes  Hypertension no   Dyslipidemia no  Joint pain no  Back pain yes  GERD no     Anthropometrics:  Estimated body mass index is 42.07 kg/m  as calculated from the following:    Height as of an earlier encounter on 9/18/20: 1.632 m (5' 4.25\").    Weight as of an earlier encounter on 9/18/20: 112 kg (247 lb).    Medications for Weight Loss:  None at this time    NUTRITION HISTORY  See MD note for details.  NKFA. Does not tolerate dairy very well, but does not avoid.   Had done keto in the past, for 2 weeks, lost 1 lbs per day, was not able to sustain.   No current vit/min supplement intake.   Started new job working mornings 3 days per week, otherwise sleeping in. Other job starts at 3:30 pm. Works as a  3 nights per week.   Wakes to intense cravings in the middle of the night.  Food choice highly driven by cravings. Wants to focus on changes that help reduce cravings.   Wants to tray to not eat after 7pm.   Sleep: varies between too little and too much. During quarantine sleeping 4am-3pm. Now tries to go to sleep at 11-12 pm to wake at 7am, but often not able to fall asleep untiln 1-2 am.    Cravings: greasy, high-fat foods.     Recent food recall:  Breakfast: skipping or eggs  Lunch: 2 pm - smoothie (greens + frozen fruit (<1/2) + NF vanilla yogurt + flax seeds); [place] (white bread + turkey/ham + berry + " "cheese + lettuce); Frozen meal (Uncrustable PB and J; Mini Pizza)  Dinner: 9-10 pm Fast-food (Mc[employer], Pasta, Chicken sandwich, Burger)   Snacks: Once per week waking in the middle night very hungry - will have a PBJ, string cheese of granola bar, crackers (Ritz), almonds    Beverages: Water, Gatorade zero  Alcohol: 1-3 drinks - Vodka soda, Proseco or red wine. Having 5-6 on weekends    Dining out: 5-6 times per week.     Physical Activity:  \"No PA\" had set a goal [place], was able to do 1-2 times per week.     MALNUTRITION  % Intake: No decreased intake noted  % Weight Loss: None noted  Subcutaneous Fat Loss: None observed  Muscle Loss: None observed  Fluid Accumulation/Edema: None noted  Malnutrition Diagnosis: Patient does not meet two of the established criteria necessary for diagnosing malnutrition    Nutrition Prescription  Recommended energy/nutrient modification.  1200 calories/day (per MD)    Nutrition Diagnosis  Obesity r/t long history of self-monitoring deficit and excessive energy intake aeb BMI >30.    Nutrition Intervention  Materials/education provided on 1200 calorie/day diet, Volumetric eating to help satiety level on fewer calories; portion control and healthy food choices (Plate Method and Volumetrics handouts), 100 calorie snack choices, meal and snack planning and mindful eating. Co-developed goals focused on reducing cravings and providing more structure to meal pattern. Sent list of goals and handouts to pt via Activehours.     Patient Understanding: good  Expected Compliance: good  Follow-Up Plans: Meal planning      Nutrition Goals  1) Follow 1200 calorie/day plan  2) Eat 3 meals per day   - Take some time to plan meals in advanced  3) Reduce alcohol intake. Limit intake to weekends.   4) Aim for 7 hours of sleep per night    The Plate Method  [email or web address]    Protein Sources for Weight Loss  [email or web address] "     Carbohydrates  [email or web address]     Mindful Eating  [email or web address]     Summary of Volumetrics Eating Plan  [email or web address]       Follow-Up:  PRN      Again, thank you for allowing me to participate in the care of your patient.  Sincerely,    Tania Hayward RD, LD

## 2020-09-18 NOTE — PATIENT INSTRUCTIONS
Nice to talk with you today in virtual clinic.    Neurontin (gabapentin) is associated with fatigue, weight gain. Please minimize your use of this medication and/or try to stop taking it. Perhaps Xanax maybe more helpful for your panic attacks - talk to your treatment team if you are having frequent panic attacks.    After stopping gabapentin, for your eating disorder and to help reduce alcohol intake, please start taking Naltrexone 50 mg tabs daily, Take it one to two hours prior to worst cravings. Start with 1/2 tab and if tolerated, on 3rd day, increase to full 50 mg tab.    Cut down on alcohol intake to 0 to help support your weight loss goals.    1,200 calorie meal plan to lose 1 lbs weekly without exercise (based on REE calc of 1,860)  Use meal replacements such as Maryellen's meals, Lean Cuisines, Healthy Choice, Smart Ones, Weight Watchers Meals, and Slim Fast and Glucerna shakes and supplement with fresh fruits and vegetables  Please drink a lot of water daily. Most people typically need about 2 liters of water daily and more if they are exercising, have a large weight, or have a fever or illness. Add Crystal Light for flavoring if desired. But no pop with calories in it.  Please keep a food journal of what you eat, calories in what you eat  Consider using applications for smart phones such as SplitSecnd, [employer], Proteus Biomedical, LoseIt, Tap&Track, and RelaxM.  Focus on wet volumetrics, meaning, eat more foods that are high in water and fiber such as fruits and vegetables in order to get that full feeling. These are also good for your overall health as well.  Check out Dr. Mansi Hernandez from [facility] - she has cookbooks with low calorie volumetric recipes  You can try Let's Dish to help you prepare meals for you and your family. Often times, the caloric and nutrition data and serving sizes are available for this food. This can be a time saving maneuver. The website can give you more information  http://www.Bakers Shoes.BIXI/  Check out Hello Fresh at https://www.hellofresh.com/food-boxes/classic-box/  Try Cooking Light recipes for low calorie meal preparation and planning  Other food plan options you can search for on the internet and check out include: Ronda HARTMANN, [facility]    Start walking program working up to 4 miles daily and home exercises using videos and home equipment.    Please consider health psychologist to discuss how mood, depression and/or anxiety impact your eating.    Psychological Providers    Check with your insurance to see if the psychologist is covered, if not, ask your insurance company for a referral.    [facility]   Henrry Mcadams, PhD, LP   [phone]  Josephine Rey, PhD, LP  [phone]  Irene Martinez, PhD                 [phone]    Luis Lara, Keviny,LP             [phone]    [place]  Danelle Teixeira, PhD, LP  [phone]      [facility] Service:           [phone]  We send a referral and patient is notified.    [place]  [facility]:  [phone]  Kevin ToyD, [facility]  Nydia Foreman PSyD, LP         [phone]    [facility]   To schedule, please call member services on the back of your card.    ROSIE Funez PsyD,LP,ABPP [phone]    [place]  Jeremias Costa MA, [phone]    [place]  Kevin ArnettyD, [phone]    [place]  Kevin FranzyD, [phone]  (fibromyalgia issues)    ZELDA Mak  [phone]    [place]  Kevin EsparzayD,LP  [phone]  Melquiades Craig, PhD, LP  [phone]  Melquiades Machado, PhD, LP             [phone]    [place]  Ryan Maloney , [facility], LMFT [phone]    [place]  Suellen Ly, KevinyD, [phone]    ZELDA Blanco PsyD,    [phone]     [facility] [phone]   [place]  KAVON Vaughn, LP  - ext. [phone]  Melquiades Pelayo, PhD, LP - ext [phone]  Shen Santamaria PsyD, LP - ext [phone]    [place]al Dubose, PhD, -[phone]  Casey Villar [facility], [phone]          [place] Landaverde, PhD, [phone]    Kevin Hernandez, LP  [phone]            Call updates to CORTEZ Nazario    [phone]  Last updated: 08/16/2019      RTC: 2 months    Johnie Taylor,  Dr. Taisha Alarcon MD, MPH  Endocrinologist

## 2020-12-13 ENCOUNTER — HEALTH MAINTENANCE LETTER (OUTPATIENT)
Age: 25
End: 2020-12-13

## 2021-03-25 ENCOUNTER — OFFICE VISIT - HEALTHEAST (OUTPATIENT)
Dept: FAMILY MEDICINE | Facility: CLINIC | Age: 26
End: 2021-03-25

## 2021-03-25 DIAGNOSIS — J02.9 SORETHROAT: ICD-10-CM

## 2021-03-26 ENCOUNTER — AMBULATORY - HEALTHEAST (OUTPATIENT)
Dept: FAMILY MEDICINE | Facility: CLINIC | Age: 26
End: 2021-03-26

## 2021-03-26 LAB
DEPRECATED S PYO AG THROAT QL EIA: NORMAL
GROUP A STREP BY PCR: NORMAL

## 2021-03-27 ENCOUNTER — COMMUNICATION - HEALTHEAST (OUTPATIENT)
Dept: FAMILY MEDICINE | Facility: CLINIC | Age: 26
End: 2021-03-27

## 2021-03-27 ENCOUNTER — COMMUNICATION - HEALTHEAST (OUTPATIENT)
Dept: SCHEDULING | Facility: CLINIC | Age: 26
End: 2021-03-27

## 2021-03-27 LAB
SARS-COV-2 PCR COMMENT: NORMAL
SARS-COV-2 RNA SPEC QL NAA+PROBE: NEGATIVE
SARS-COV-2 VIRUS SPECIMEN SOURCE: NORMAL

## 2021-03-29 ENCOUNTER — COMMUNICATION - HEALTHEAST (OUTPATIENT)
Dept: FAMILY MEDICINE | Facility: CLINIC | Age: 26
End: 2021-03-29

## 2021-04-17 ENCOUNTER — HEALTH MAINTENANCE LETTER (OUTPATIENT)
Age: 26
End: 2021-04-17

## 2021-06-06 ENCOUNTER — COMMUNICATION - HEALTHEAST (OUTPATIENT)
Dept: FAMILY MEDICINE | Facility: CLINIC | Age: 26
End: 2021-06-06

## 2021-06-16 NOTE — PROGRESS NOTES
Angelia Epstein is a 25 y.o. female who is being evaluated via a billable video visit.      How would you like to obtain your AVS? Mail a copy.  If dropped from the video visit, the video invitation should be resent by: Text to cell phone: [phone]  Will anyone else be joining your video visit? No      Video Start Time: 11:53 AM    Assessment & Plan     Sorethroat    - Symptomatic COVID-19 Virus (CORONAVIRUS) PCR  - Rapid Strep A Screen- Throat Swab  Supportive care offered  We will follow-up to the results of the study and manage accordingly.        7 minutes spent on the date of the encounter doing chart review, patient visit and documentation          No follow-ups on file.    Talha Celaya MD  [facility]    Subjective   Angelia Epstein is 25 y.o. and presents today for the following health issues   HPI   She is video visiting today for having upper respiratory symptoms of congestion, stuffy nose, sore throat and a headache since last week starting with a sore throat which has somewhat improved with remaining symptoms are persisting.  She has no fever, coughing or shortness of breath, and is requesting to be tested for COVID-19 and strep.  She has a history of COVID-19 infection back in June 2020.          Objective    Vitals - Patient Reported  Weight (Patient Reported): 240 lb (108.9 kg)    Physical Exam  GENERAL: no distress  RESP: No audible wheeze, cough, or visible cyanosis.  No visible retractions or increased work of breathing.    NEURO: Cranial nerves grossly intact. Mentation and speech appropriate for age.              Video-Visit Details    Type of service:  Video Visit    Video End Time (time video stopped): 12:00 PM  Originating Location (pt. Location): [place]     Distant Location (provider location):  [facility]     Platform used for Video Visit: EdouardRetellity

## 2021-06-21 NOTE — LETTER
Letter by Talha Celaya MD at      Author: Talha Celaya MD Service: -- Author Type: --    Filed:  Encounter Date: 3/29/2021 Status: (Other)         Angelia Epstein  [address]  [place] [address]             March 29, 2021         Dear Ms. Epstein,    Below are the results from your recent visit:    Resulted Orders   Rapid Strep A Screen- Throat Swab   Result Value Ref Range    Rapid Strep A Antigen No Group A Strep detected, presumptive negative No Group A Strep detected, presumptive negative   COVID-19 Virus PCR MRF   Result Value Ref Range    COVID-19 VIRUS SPECIMEN SOURCE Nasopharyngeal     2019-nCOV       Test received-See reflex to IDDL test SARS CoV2 (COVID-19) Virus RT-PCR      Comment:        Performed and/or entered by:  [address] [address]    Group A Strep PCR Throat Swab   Result Value Ref Range    Group Strep A by PCR No Group A Strep detected No Group A Strep detected, Invalid, ERROR      Comment:      The Xpert Xpress Strep A test, performed on the Pubster Instrument System, is a rapid, qualitative in vitro diagnostic test for the detection of Streptococcus pyogenes (Group A Streptococcus) in throat swab specimens from patients with signs and symptoms of pharyngitis. The Xpert Xpress Strep A test can be used as an aid in the diagnosis of Group A Streptococcal pharyngitis. The assay is not intended to monitor treatment for Group A Streptococcus infections. The Xpert Xpress Strep A test utilizes an automated real-time polymerase chain reaction (PCR) to detect Streptococcus pyogenes DNA.   SARS-CoV-2 (COVID-19)-PCR   Result Value Ref Range    SARS-CoV-2 Virus Specimen Source Nasopharyngeal     SARS-CoV-2 PCR Result NEGATIVE       Comment:      SARS-CoV2 (COVID-19) RNA not detected, presumed negative.    SARS-COV-2 PCR COMMENT       Testing was performed using the Xpert Xpress SARS-CoV-2 Assay on the SmartDocs (Teknowmics)       Comment:      Gene-Xpert Instrument Systems. Additional information about this Emergency Use  Authorization (EUA) assay can be found via the Lab Guide.  This test should be ordered for the detection of SARS-CoV-2 in individuals who  meet SARS-CoV-2 clinical and/or epidemiological criteria. Test performance is  unknown in asymptomatic patients.  This test is for in vitro diagnostic use under the FDA EUA for laboratories  certified under CLIA to perform high complexity testing. This test has not been  FDA cleared or approved.  A negative result does not rule out the presence of PCR inhibitors in the  specimen or target RNA in concentration below the limit of detection for the  assay. The possibility of a false negative should be considered if the  patient's recent exposure or clinical presentation suggests COVID-19.  This test was validated by the [facility] Infectious Diseases Diagnostic  Laboratory. This laboratory is certified under the Clinical Laboratory  Improvement Amendments of 1988  (CLIA-88) as qualified to perform high  complexity laboratory testing.    Performed and/or entered by:  [address] [address]        Negative     Please call with questions or contact us using RV ID.    Sincerely,        Electronically signed by Talha Celaya MD        - c/w oxybutynin and flomax

## 2021-07-15 ENCOUNTER — OFFICE VISIT (OUTPATIENT)
Dept: FAMILY MEDICINE | Facility: CLINIC | Age: 26
End: 2021-07-15
Payer: COMMERCIAL

## 2021-07-15 VITALS
HEART RATE: 76 BPM | TEMPERATURE: 98.3 F | DIASTOLIC BLOOD PRESSURE: 76 MMHG | BODY MASS INDEX: 42.75 KG/M2 | WEIGHT: 251 LBS | OXYGEN SATURATION: 96 % | SYSTOLIC BLOOD PRESSURE: 115 MMHG

## 2021-07-15 DIAGNOSIS — F41.0 PANIC ATTACK: ICD-10-CM

## 2021-07-15 DIAGNOSIS — F33.1 MODERATE EPISODE OF RECURRENT MAJOR DEPRESSIVE DISORDER (H): ICD-10-CM

## 2021-07-15 DIAGNOSIS — F41.1 GAD (GENERALIZED ANXIETY DISORDER): Primary | ICD-10-CM

## 2021-07-15 PROCEDURE — 99214 OFFICE O/P EST MOD 30 MIN: CPT | Performed by: FAMILY MEDICINE

## 2021-07-15 RX ORDER — HYDROXYZINE HYDROCHLORIDE 10 MG/1
10-20 TABLET, FILM COATED ORAL 3 TIMES DAILY PRN
Qty: 30 TABLET | Refills: 0 | Status: SHIPPED | OUTPATIENT
Start: 2021-07-15 | End: 2022-01-18

## 2021-07-15 RX ORDER — BUSPIRONE HYDROCHLORIDE 5 MG/1
TABLET ORAL
Qty: 150 TABLET | Refills: 1 | Status: SHIPPED | OUTPATIENT
Start: 2021-07-15 | End: 2022-01-18

## 2021-07-15 ASSESSMENT — ANXIETY QUESTIONNAIRES
4. TROUBLE RELAXING: NOT AT ALL
1. FEELING NERVOUS, ANXIOUS, OR ON EDGE: NEARLY EVERY DAY
5. BEING SO RESTLESS THAT IT IS HARD TO SIT STILL: SEVERAL DAYS
GAD7 TOTAL SCORE: 14
7. FEELING AFRAID AS IF SOMETHING AWFUL MIGHT HAPPEN: SEVERAL DAYS
8. IF YOU CHECKED OFF ANY PROBLEMS, HOW DIFFICULT HAVE THESE MADE IT FOR YOU TO DO YOUR WORK, TAKE CARE OF THINGS AT HOME, OR GET ALONG WITH OTHER PEOPLE?: SOMEWHAT DIFFICULT
3. WORRYING TOO MUCH ABOUT DIFFERENT THINGS: NEARLY EVERY DAY
2. NOT BEING ABLE TO STOP OR CONTROL WORRYING: NEARLY EVERY DAY
6. BECOMING EASILY ANNOYED OR IRRITABLE: NEARLY EVERY DAY
GAD7 TOTAL SCORE: 14
GAD7 TOTAL SCORE: 14
7. FEELING AFRAID AS IF SOMETHING AWFUL MIGHT HAPPEN: SEVERAL DAYS

## 2021-07-15 ASSESSMENT — PATIENT HEALTH QUESTIONNAIRE - PHQ9
SUM OF ALL RESPONSES TO PHQ QUESTIONS 1-9: 14
10. IF YOU CHECKED OFF ANY PROBLEMS, HOW DIFFICULT HAVE THESE PROBLEMS MADE IT FOR YOU TO DO YOUR WORK, TAKE CARE OF THINGS AT HOME, OR GET ALONG WITH OTHER PEOPLE: SOMEWHAT DIFFICULT
SUM OF ALL RESPONSES TO PHQ QUESTIONS 1-9: 14

## 2021-07-15 NOTE — PROGRESS NOTES
Assessment & Plan     1. NABEEL (generalized anxiety disorder), MDD, panic attacks  - Due to worsening anxiety and panic attack pt wanted to start medication only for anxiety. Discussed s/e of Buspar and pt agreeable to try medication. Was on low dose Lexapro 5 mg daily which was not helpful and doesn't want to try gabapentin. Discussed propanolol PRN for anxiety but due to low B/P will hold off and use atarax PRN. Advised medication can cause drowsiness and to not drive or take with alcohol.  - MENTAL HEALTH REFERRAL  - Adult; Outpatient Treatment; Individual/Couples/Family/Group Therapy/Health Psychology; [facility]: [facility] [phone]; We will contact you to schedule the appointment or please call with any questions; Future  - busPIRone (BUSPAR) 5 MG tablet; Start at 5 mg twice daily for 3 days, then 7.5 mg (1.5 tabs) twice daily for 3 days, then 10 mg (2 tabs) twice daily for 3 days, then 12.5 mg (2.5 tabs) twice daily for 3 days, then 15 mg (3 tabs) twice daily and stay at that dose  Dispense: 150 tablet; Refill: 1  - hydrOXYzine (ATARAX) 10 MG tablet; Take 1-2 tablets (10-20 mg) by mouth 3 times daily as needed for anxiety  Dispense: 30 tablet; Refill: 0    2. Moderate episode of recurrent major depressive disorder (H)  - MENTAL HEALTH REFERRAL  - Adult; Outpatient Treatment; Individual/Couples/Family/Group Therapy/Health Psychology; [facility]: [facility] [phone]; We will contact you to schedule the appointment or please call with any questions; Future    3. Panic attack  - hydrOXYzine (ATARAX) 10 MG tablet; Take 1-2 tablets (10-20 mg) by mouth 3 times daily as needed for anxiety  Dispense: 30 tablet; Refill: 0      Deqa Alea Plummer MD  [facility]    Edita DAI is a 25 year old who presents for the following health issues     HPI     Depression and Anxiety Follow-Up        She has had really bad anxiety lately and wants to have  medication that works for anxiety. She also wants medication for depression. She has had bad anxiety since she was little and used to have panic attacks. She managed how to manage panic attacks. Due to school and new job she has been experiencing panic attacks. Her anxiety she feels super nauseous and sick. The last 1.5 months she has had difficulty going to sleep and has been super restless. She is usually good at doing breath techniques to relax herself. She has been trying to see a therapist. She has seen a therapist in the past.     Social History     Tobacco Use     Smoking status: Never Smoker     Smokeless tobacco: Never Used   Substance Use Topics     Alcohol use: Yes     Comment: socially     Drug use: No     PHQ 9/27/2019 2/24/2020 7/15/2021   PHQ-9 Total Score 13 15 14   Q9: Thoughts of better off dead/self-harm past 2 weeks Not at all Not at all Not at all     NABEEL-7 SCORE 9/27/2019 2/24/2020 7/15/2021   Total Score - - 14 (moderate anxiety)   Total Score 9 9 14         Review of Systems         Objective    There were no vitals taken for this visit.  There is no height or weight on file to calculate BMI.  Physical Exam                   Answers for HPI/ROS submitted by the patient on 7/15/2021  If you checked off any problems, how difficult have these problems made it for you to do your work, take care of things at home, or get along with other people?: Somewhat difficult  PHQ9 TOTAL SCORE: 14  NABEEL 7 TOTAL SCORE: 14

## 2021-07-15 NOTE — PATIENT INSTRUCTIONS
- busPIRone (BUSPAR) 5 MG tablet; Start at 5 mg twice daily for 3 days, then 7.5 mg (1.5 tabs) twice daily for 3 days, then 10 mg (2 tabs) twice daily for 3 days, then 12.5 mg (2.5 tabs) twice daily for 3 days, then 15 mg (3 tabs) twice daily and stay at that dose    - hydrOXYzine (ATARAX) 10 MG tablet; Take 1-2 tablets (10-20 mg) by mouth 3 times daily as needed for anxiety; do not drive or take with alcohol

## 2021-07-16 ASSESSMENT — ANXIETY QUESTIONNAIRES: GAD7 TOTAL SCORE: 14

## 2021-07-16 ASSESSMENT — PATIENT HEALTH QUESTIONNAIRE - PHQ9: SUM OF ALL RESPONSES TO PHQ QUESTIONS 1-9: 14

## 2021-09-26 ENCOUNTER — HEALTH MAINTENANCE LETTER (OUTPATIENT)
Age: 26
End: 2021-09-26

## 2022-01-15 ENCOUNTER — OFFICE VISIT (OUTPATIENT)
Dept: FAMILY MEDICINE | Facility: CLINIC | Age: 27
End: 2022-01-15
Payer: COMMERCIAL

## 2022-01-15 VITALS
RESPIRATION RATE: 16 BRPM | TEMPERATURE: 98.6 F | HEART RATE: 68 BPM | SYSTOLIC BLOOD PRESSURE: 130 MMHG | DIASTOLIC BLOOD PRESSURE: 79 MMHG | OXYGEN SATURATION: 98 %

## 2022-01-15 DIAGNOSIS — R07.0 THROAT PAIN: Primary | ICD-10-CM

## 2022-01-15 LAB
DEPRECATED S PYO AG THROAT QL EIA: NEGATIVE
GROUP A STREP BY PCR: NOT DETECTED

## 2022-01-15 PROCEDURE — 99213 OFFICE O/P EST LOW 20 MIN: CPT | Performed by: STUDENT IN AN ORGANIZED HEALTH CARE EDUCATION/TRAINING PROGRAM

## 2022-01-15 PROCEDURE — 87651 STREP A DNA AMP PROBE: CPT | Performed by: STUDENT IN AN ORGANIZED HEALTH CARE EDUCATION/TRAINING PROGRAM

## 2022-01-15 RX ORDER — IBUPROFEN 100 MG/5ML
10 SUSPENSION, ORAL (FINAL DOSE FORM) ORAL EVERY 6 HOURS PRN
COMMUNITY
End: 2024-01-23

## 2022-01-15 RX ORDER — ACETAMINOPHEN 325 MG/1
325-650 TABLET ORAL EVERY 6 HOURS PRN
COMMUNITY
End: 2024-10-02

## 2022-01-15 NOTE — PROGRESS NOTES
SUBJECTIVE:  Angelia Epstein is an 26 year old female who presents for sore throat.  Patient presents with about 5 days of sore throat.  Got over COVID-19 in December and has not had symptoms for a while but now is having the sore throat.  Had nasal congestion starting today as well.  Denies any other symptoms including fevers, chills, cough, nausea, vomiting, diarrhea, myalgias, loss of sense of taste or smell.  Is vaccinated for COVID-19 but has not received her booster.    PMH:   has a past medical history of NO ACTIVE PROBLEMS.  Patient Active Problem List   Diagnosis     Chronic constipation     Headache     Insomnia     Obesity     Decreased strength     Secondary oligomenorrhea     Current moderate episode of major depressive disorder without prior episode (H)     NABEEL (generalized anxiety disorder)     Social History     Socioeconomic History     Marital status: Single     Spouse name: None     Number of children: None     Years of education: None     Highest education level: None   Occupational History     None   Tobacco Use     Smoking status: Never Smoker     Smokeless tobacco: Never Used   Substance and Sexual Activity     Alcohol use: Yes     Comment: socially     Drug use: No     Sexual activity: Yes     Partners: Male     Birth control/protection: Condom   Other Topics Concern     Parent/sibling w/ CABG, MI or angioplasty before 65F 55M? Not Asked   Social History Narrative    FAMILY INFORMATION     Date: 2010    Parent #1      Name: Yamileth Epstein   Gender: female   : 75      Occupation: RN        Parent #2      Name: Melquiades Reich   Gender: Male   : 10/24/72     Occupation: Sales        Siblings:  Name: Lebron    Age: 17yrs    Name: Maryjo    Age: 10yrs    (These sibs live with Dad's girlfriends)        Relationship Status of Parent(s): Mom is single and Dad is partnered with his girlfriend    Who does the child live with? mother    What language(s) is/are spoken at home?  English    Child's Country of Birth? [place]                 Social [employer]     Financial Resource Strain: Not on file   Food Insecurity: Not on file   Transportation Needs: Not on file   Physical Activity: Not on file   Stress: Not on file   Social Connections: Not on file   Intimate Partner Violence: Not on file   Housing Stability: Not on file     Family History   Problem Relation Age of Onset     Allergies Father      Allergies Maternal Aunt      Allergies Paternal Aunt      Hypertension Paternal Grandfather      Cerebrovascular Disease Paternal Grandfather      Diabetes Paternal Grandfather        ALLERGIES:  Cats    Current Outpatient Medications   Medication     acetaminophen (TYLENOL) 325 MG tablet     ibuprofen (ADVIL/MOTRIN) 100 MG/5ML suspension     busPIRone (BUSPAR) 5 MG tablet     escitalopram (LEXAPRO) 5 MG tablet     hydrOXYzine (ATARAX) 10 MG tablet     naltrexone (DEPADE/REVIA) 50 MG tablet     No current facility-administered medications for this visit.         ROS:  ROS is done and is negative for general/constitutional, eye, ENT, Respiratory, cardiovascular, GI, , Skin, musculoskeletal except as noted elsewhere.  All other review of systems negative except as noted elsewhere.    OBJECTIVE:  /79 (BP Location: Right arm, Patient Position: Sitting, Cuff Size: Adult Regular)   Pulse 68   Temp 98.6  F (37  C) (Oral)   Resp 16   SpO2 98%   GENERAL APPEARANCE: Alert, in no acute distress.  EYES: Conjunctivae clear.  EARS: External ears normal. Canals clear. TMs normal.  NOSE: Normal, no drainage.  OROPHARYNX: MMM.  Moderately erythamatous.  No exudate.  NECK: Supple, symmetrical, no adenopathy.  RESP: Clear to auscultation bilaterally, no wheezing or retractions, no increased effort.  CV: Regular rate and rhythm, no murmurs, good capillary refill.  ABDOMEN: nondistended.  SKIN: No ulcers, lesions or rash.  MUSCULOSKELETAL: No gross deformities.  NEURO: No gross deficits, CN 2-12  grossly intact.    RESULTS  Results for orders placed or performed in visit on 01/15/22   Streptococcus A Rapid Screen w/Reflex to PCR - Clinic Collect     Status: Normal    Specimen: Throat; Swab   Result Value Ref Range    Group A Strep antigen Negative Negative     No results found for this or any previous visit (from the past 48 hour(s)).    ASSESSMENT/PLAN:  (R07.0) Throat pain  (primary encounter diagnosis)  Comment: Presentation most consistent with either mild viral URI with significant sore throat or strep pharyngitis.  Rapid strep negative.  Strep PCR in process and will treat appropriately pending results.  No indication for empiric antibiotics at this time and patient is nontoxic so no indication for higher level of care at this time.  It does not seem that COVID-19 testing would be helpful at this time as she just got over a bout of it in December.  Plan: Streptococcus A Rapid Screen w/Reflex to PCR -         Clinic Collect, Group A Streptococcus PCR         Throat Swab          PPE worn: Full PPE.    Options for treatment and follow-up care were reviewed with the patient and/or guardian. Angelia Epstein and/or guardian engaged in the decision making process and verbalized understanding of the options discussed and agreed with the final plan.    See [facility] for orders, medications, letters, patient instructions    Messi Casarez MD

## 2022-01-18 ENCOUNTER — OFFICE VISIT (OUTPATIENT)
Dept: FAMILY MEDICINE | Facility: CLINIC | Age: 27
End: 2022-01-18
Payer: COMMERCIAL

## 2022-01-18 VITALS
SYSTOLIC BLOOD PRESSURE: 125 MMHG | WEIGHT: 251.3 LBS | TEMPERATURE: 98.4 F | DIASTOLIC BLOOD PRESSURE: 77 MMHG | HEART RATE: 73 BPM | RESPIRATION RATE: 16 BRPM | BODY MASS INDEX: 42.8 KG/M2 | OXYGEN SATURATION: 97 %

## 2022-01-18 DIAGNOSIS — J02.9 SORE THROAT: ICD-10-CM

## 2022-01-18 DIAGNOSIS — H66.92 LEFT ACUTE OTITIS MEDIA: Primary | ICD-10-CM

## 2022-01-18 PROBLEM — E66.01 MORBID OBESITY (H): Status: ACTIVE | Noted: 2022-01-18

## 2022-01-18 PROCEDURE — 99213 OFFICE O/P EST LOW 20 MIN: CPT | Performed by: FAMILY MEDICINE

## 2022-01-18 RX ORDER — PREDNISONE 20 MG/1
60 TABLET ORAL DAILY
Qty: 15 TABLET | Refills: 0 | Status: SHIPPED | OUTPATIENT
Start: 2022-01-18 | End: 2022-01-23

## 2022-01-18 RX ORDER — CEFDINIR 300 MG/1
600 CAPSULE ORAL DAILY
Qty: 20 CAPSULE | Refills: 0 | Status: SHIPPED | OUTPATIENT
Start: 2022-01-18 | End: 2022-01-28

## 2022-01-18 NOTE — PATIENT INSTRUCTIONS
Patient Education     Middle Ear Infection (Otitis Media) in Adults  What is a middle ear infection?  A middle ear infection occurs behind the eardrum. It is most often caused by a virus or bacteria. Most kids have at least one middle ear infection by the time they are 3 years old. But adults can also get them.   What causes middle ear infections?  Inflammation in the middle ear most often starts after you ve had a sore throat, cold, or other upper respiratory problem. The infection spreads to the middle ear and causes fluid buildup behind the eardrum.    What are the symptoms of a middle ear infection?  These are the most common symptoms of middle ear infections in adults:     Ear pain    Feeling of fullness in the ear    Fluid draining from the ear    Fever    Hearing loss  These symptoms may look like other conditions or health problems. Always talk with your healthcare provider for a diagnosis.   How is a middle ear infection diagnosed?  Your healthcare provider will review your health history and do a physical exam. They will check the outer ear and the eardrum using an otoscope. This is a lighted tool that lets the healthcare provider see inside the ear. A pneumatic otoscope blows a puff of air into the ear to test eardrum movement. When there is fluid or infection in the middle ear, movement is decreased.   Your provider may also do a tympanometry. This is a test that directs air and sound to the middle ear.   If you have ear infections often, your healthcare provider may suggest having a hearing test.   How is a middle ear infection treated?  Treatment will depend on your symptoms, age, and general health. It will also depend on how severe the condition is.   Treatment may include:    Antibiotics    Pain relievers    Placing small tubes in the eardrum for chronic ear infections   What are possible complications of a middle ear infection?   Untreated ear infections can lead to:    Infection in other parts  of the head    Lasting (permanent) hearing loss    Speech and language problems  Can middle ear infections be prevented?  Cold and allergy medicines don't seem to prevent ear infections. And currently there is no vaccine that can prevent the disease. But check with your healthcare provider and make sure your vaccines are up-to-date. Living in a home where cigarettes are smoked can increase the chances of ear infections. So can living in a home where vaping devices, such as e-cigarettes and electronic nicotine, are used   Key points about middle ear infections    Middle ear infections can affect both children and adults.    Pain and fever can be the most common symptoms.    Without treatment, permanent hearing loss may happen.    Take antibiotics as prescribed and finish all of the prescription. This can help prevent antibiotic-resistant infections or incomplete treatment with the infection returning.    Keeping your home smoke-free or free of vaping devices can decrease the chances of ear infections.    Next steps  Tips to help you get the most from a visit to your healthcare provider:    Know the reason for your visit and what you want to happen.    Before your visit, write down questions you want answered.    Bring someone with you to help you ask questions and remember what your provider tells you.    At the visit, write down the name of a new diagnosis, and any new medicines, treatments, or tests. Also write down any new instructions your provider gives you.    Know why a new medicine or treatment is prescribed, and how it will help you. Also know what the side effects are.    Ask if your condition can be treated in other ways.    Know why a test or procedure is recommended and what the results could mean.    Know what to expect if you do not take the medicine or have the test or procedure.    If you have a follow-up appointment, write down the date, time, and purpose for that visit.    Know how you can contact  your provider if you have questions.  Elmer last reviewed this educational content on 1/1/2021 2000-2021 [employer]. All rights reserved. This information is not intended as a substitute for professional medical care. Always follow your healthcare professional's instructions.

## 2022-01-18 NOTE — PROGRESS NOTES
Assessment:       Left acute otitis media    - cefdinir (OMNICEF) 300 MG capsule  Dispense: 20 capsule; Refill: 0    Sore throat    - predniSONE (DELTASONE) 20 MG tablet  Dispense: 15 tablet; Refill: 0         Plan:     Patient with acute otitis media of left ear.  We will treat with cefdinir.  Recommend Tylenol or ibuprofen as needed for fever or discomfort.  Follow-up if symptoms are getting worse or not improving.  Recommend ear recheck with PCP in 3 weeks to ensure resolution.    Patient also with persistent sore throat, uncontrolled with Tylenol and ibuprofen.  Strep test negative earlier this week.  She has already had COVID-19 less than a month ago.  Suspect viral illness-- we will treat with prednisone.  Follow-up if symptoms getting worse or not improving.      MEDICATIONS:   Orders Placed This Encounter   Medications     cefdinir (OMNICEF) 300 MG capsule     Sig: Take 2 capsules (600 mg) by mouth daily for 10 days     Dispense:  20 capsule     Refill:  0     predniSONE (DELTASONE) 20 MG tablet     Sig: Take 3 tablets (60 mg) by mouth daily for 5 days     Dispense:  15 tablet     Refill:  0             Subjective:       26 year old female presents for evaluation of 1 week history of sore throat.  She has been taking Tylenol and ibuprofen around-the-clock without much relief of her symptoms.  She was seen for this last week and this note was reviewed and strep test was negative.  She had COVID-19 infection less than a month ago and symptoms have resolved entirely.  She denies difficulty breathing.  She feels like her throat is somewhat swollen but has not had problems swallowing other than due to the pain.  Since last night her left ear has been very painful.  No drainage.    Patient Active Problem List   Diagnosis     Chronic constipation     Headache     Insomnia     Obesity     Decreased strength     Secondary oligomenorrhea     Current moderate episode of major depressive disorder without prior episode  (H)     NABEEL (generalized anxiety disorder)     Morbid obesity (H)       Past Medical History:   Diagnosis Date     NO ACTIVE PROBLEMS        Past Surgical History:   Procedure Laterality Date     CYSTECTOMY PILONIDAL  3/7/2012    Procedure:CYSTECTOMY PILONIDAL; I & D of Pilonidal cyst; Surgeon:ROSARIO GOODE; Location:[facility]     HEAD & NECK SURGERY      tonsilectomy       Current Outpatient Medications   Medication     acetaminophen (TYLENOL) 325 MG tablet     cefdinir (OMNICEF) 300 MG capsule     ibuprofen (ADVIL/MOTRIN) 100 MG/5ML suspension     predniSONE (DELTASONE) 20 MG tablet     No current facility-administered medications for this visit.       Allergies   Allergen Reactions     Cats Rash     Rash, eyes get swollen         Family History   Problem Relation Age of Onset     Allergies Father      Allergies Maternal Aunt      Allergies Paternal Aunt      Hypertension Paternal Grandfather      Cerebrovascular Disease Paternal Grandfather      Diabetes Paternal Grandfather        Social History     Socioeconomic History     Marital status: Single     Spouse name: None     Number of children: None     Years of education: None     Highest education level: None   Occupational History     None   Tobacco Use     Smoking status: Never Smoker     Smokeless tobacco: Never Used   Substance and Sexual Activity     Alcohol use: Yes     Comment: socially     Drug use: No     Sexual activity: Yes     Partners: Male     Birth control/protection: Condom   Other Topics Concern     Parent/sibling w/ CABG, MI or angioplasty before 65F 55M? Not Asked   Social History Narrative    FAMILY INFORMATION     Date: 2010    Parent #1      Name: Yamileth Epstein   Gender: female   : 75      Occupation: RN        Parent #2      Name: Melquiades Reich   Gender: Male   : 10/24/72     Occupation: Sales        Siblings:  Name: Lebron    Age: 17yrs    Name: Maryjo    Age: 10yrs    (These sibs live with Dad's girlfriends)         Relationship Status of Parent(s): Mom is single and Dad is partnered with his girlfriend    Who does the child live with? mother    What language(s) is/are spoken at home? English    Child's Country of Birth? [place]                 Social Determinants of Health     Financial Resource Strain: Not on file   Food Insecurity: Not on file   Transportation Needs: Not on file   Physical Activity: Not on file   Stress: Not on file   Social Connections: Not on file   Intimate Partner Violence: Not on file   Housing Stability: Not on file         Review of Systems  Pertinent items are noted in HPI.      Objective:                     General Appearance:    /77   Pulse 73   Temp 98.4  F (36.9  C) (Oral)   Resp 16   Wt 114 kg (251 lb 4.8 oz)   LMP 01/07/2022   SpO2 97%   Breastfeeding No   BMI 42.80 kg/m          Alert, pleasant, cooperative, no distress, appears stated age   Head:    Normocephalic, without obvious abnormality, atraumatic   Eyes:    Conjunctiva/corneas clear   Ears:   Left tympanic membrane is erythematous and bulging with a purulent effusion present.  Right tympanic membrane is normal.  Ear canals normal bilaterally.   Nose:   Nares normal, septum midline, mucosa normal, no drainage    or sinus tenderness   Throat:  Bilateral tonsillar erythema.  No peritonsillar swelling.  Uvula is normal.  No exudate seen.   Neck:   Supple, symmetrical, trachea midline, no adenopathy    Lungs:     Clear to auscultation bilaterally without wheezes, rales, or rhonchi, respirations unlabored    Heart:    Regular rate and rhythm, S1 and S2 normal, no murmur, rub or gallop       Extremities:   Extremities normal, atraumatic, no cyanosis or edema   Skin:   Skin color, texture, turgor normal, no rashes or lesions               This note has been dictated using voice recognition software. Any grammatical or context distortions are unintentional and inherent to the software

## 2022-05-08 ENCOUNTER — HEALTH MAINTENANCE LETTER (OUTPATIENT)
Age: 27
End: 2022-05-08

## 2023-01-14 ENCOUNTER — HEALTH MAINTENANCE LETTER (OUTPATIENT)
Age: 28
End: 2023-01-14

## 2023-04-20 ENCOUNTER — OFFICE VISIT (OUTPATIENT)
Dept: FAMILY MEDICINE | Facility: CLINIC | Age: 28
End: 2023-04-20
Payer: COMMERCIAL

## 2023-04-20 VITALS
TEMPERATURE: 98.2 F | DIASTOLIC BLOOD PRESSURE: 73 MMHG | BODY MASS INDEX: 42.58 KG/M2 | HEART RATE: 63 BPM | RESPIRATION RATE: 17 BRPM | WEIGHT: 250 LBS | SYSTOLIC BLOOD PRESSURE: 105 MMHG | OXYGEN SATURATION: 98 %

## 2023-04-20 DIAGNOSIS — J02.9 SORE THROAT: ICD-10-CM

## 2023-04-20 DIAGNOSIS — J06.9 VIRAL URI: Primary | ICD-10-CM

## 2023-04-20 LAB
DEPRECATED S PYO AG THROAT QL EIA: NEGATIVE
FLUAV AG SPEC QL IA: NEGATIVE
FLUBV AG SPEC QL IA: NEGATIVE
GROUP A STREP BY PCR: NOT DETECTED

## 2023-04-20 PROCEDURE — 87651 STREP A DNA AMP PROBE: CPT | Performed by: STUDENT IN AN ORGANIZED HEALTH CARE EDUCATION/TRAINING PROGRAM

## 2023-04-20 PROCEDURE — 87804 INFLUENZA ASSAY W/OPTIC: CPT | Performed by: STUDENT IN AN ORGANIZED HEALTH CARE EDUCATION/TRAINING PROGRAM

## 2023-04-20 PROCEDURE — 99214 OFFICE O/P EST MOD 30 MIN: CPT | Mod: CS | Performed by: STUDENT IN AN ORGANIZED HEALTH CARE EDUCATION/TRAINING PROGRAM

## 2023-04-20 PROCEDURE — U0003 INFECTIOUS AGENT DETECTION BY NUCLEIC ACID (DNA OR RNA); SEVERE ACUTE RESPIRATORY SYNDROME CORONAVIRUS 2 (SARS-COV-2) (CORONAVIRUS DISEASE [COVID-19]), AMPLIFIED PROBE TECHNIQUE, MAKING USE OF HIGH THROUGHPUT TECHNOLOGIES AS DESCRIBED BY CMS-2020-01-R: HCPCS | Performed by: STUDENT IN AN ORGANIZED HEALTH CARE EDUCATION/TRAINING PROGRAM

## 2023-04-20 PROCEDURE — U0005 INFEC AGEN DETEC AMPLI PROBE: HCPCS | Performed by: STUDENT IN AN ORGANIZED HEALTH CARE EDUCATION/TRAINING PROGRAM

## 2023-04-20 NOTE — PATIENT INSTRUCTIONS
Rapid strep test negative. Culture is pending. Influenza test pending. We will call you if this is positive.  For your sore throat, you may try salt water gargles, tea with honey, throat lozenges/spray, using a humidifier.  Take tylenol and/or ibuprofen as needed for pain/fever.  Stay well-hydrated, get plenty of rest, and wash your hands often.   Follow-up with your PCP in 7-10 days if symptoms persist, sooner if symptoms worsen.

## 2023-04-20 NOTE — PROGRESS NOTES
ASSESSMENT & PLAN:   Diagnoses and all orders for this visit:  Viral URI  Sore throat  -     Streptococcus A Rapid Screen w/Reflex to PCR - Clinic Collect  -     Influenza A & B Antigen - Clinic Collect  -     Symptomatic COVID-19 Virus (Coronavirus) by PCR Nose  -     Group A Streptococcus PCR Throat Swab    Sore throat x 1 days. Suspect viral etiology. Rapid strep negative, PCR pending. Influenza negative. COVID test pending -patient had COVID 2 months ago; if covid test is positive, likely from previous infection. With pharyngitis and fatigue, may consider mono testing in 1 week if symptoms are persisting. Discussed supportive treatment. Return precautions given.    Return in about 1 week (around 4/27/2023) for persistent symptoms, sooner if needed.    Patient Instructions   Rapid strep test negative. Culture is pending. Influenza test pending. We will call you if this is positive.  For your sore throat, you may try salt water gargles, tea with honey, throat lozenges/spray, using a humidifier.  Take tylenol and/or ibuprofen as needed for pain/fever.  Stay well-hydrated, get plenty of rest, and wash your hands often.   Follow-up with your PCP in 7-10 days if symptoms persist, sooner if symptoms worsen.        At the end of the encounter, I discussed results, diagnosis, medications. Discussed red flags for immediate return to clinic/ER, as well as indications for follow up if no improvement. Patient and/or caregiver understood and agreed to plan. Patient was stable for discharge.    ------------------------------------------------------------------------  SUBJECTIVE  Patient presents with:  Pharyngitis: X 1 day, feels swollen, hard to breathe, reports throat doesn't hurt but tight.   Fatigue: X 1 day, feels super tired and light headed. Migraine in temporal area.     HPI  Angelia Epstein is a(n) 27 year old female presenting to urgent care for sore throat that began yesterday. It worsened today. Also has  headache, fatigue, some rhinorrhea. No cough, fever, congestion. She had cold 2 weeks ago and was treated for left ear infection with amoxicillin. She had covid 2 months ago. No known sick contacts.    Review of Systems    Current Outpatient Medications   Medication Sig Dispense Refill     acetaminophen (TYLENOL) 325 MG tablet Take 325-650 mg by mouth every 6 hours as needed for mild pain       ibuprofen (ADVIL/MOTRIN) 100 MG/5ML suspension Take 10 mg/kg by mouth every 6 hours as needed for fever or moderate pain       Problem List:  2022-01: Morbid obesity (H)  2019-09: Secondary oligomenorrhea  2019-09: Current moderate episode of major depressive disorder   without prior episode (H)  2019-09: NABEEL (generalized anxiety disorder)  2015-07: Decreased strength  2015-03: Obesity  2012-03: Pilonidal cyst with abscess  2011-08: Chronic constipation  2011-08: Headache  2011-08: Insomnia    Allergies   Allergen Reactions     Cats Rash     Rash, eyes get swollen           OBJECTIVE  Vitals:    04/20/23 1343   BP: 105/73   BP Location: Right arm   Patient Position: Sitting   Cuff Size: Adult Large   Pulse: 63   Resp: 17   Temp: 98.2  F (36.8  C)   TempSrc: Oral   SpO2: 98%   Weight: 113.4 kg (250 lb)     Physical Exam   GENERAL: healthy, alert, no acute distress.   HEAD: normocephalic, atraumatic.  EYE: PERRL. EOMs intact. No scleral injection bilaterally.   EAR: external ear normal. Bilateral ear canals normal and nonpainful. Bilateral TM intact, pearly, translucent without bulging.  NOSE: external nose atraumatic without lesions.  OROPHARYNX: moist mucous membranes. Posterior oropharynx with moderate erythema. No exudate or soft palate swelling. Uvula midline. Patent airway.  NECK: nontender. Bilateral submandibular adenopathy.  LUNGS: no increased work of breathing. Clear lung sounds bilaterally. No wheezing, rhonchi, or rales.   CV: regular rate and rhythm. No clicks, murmurs, or rubs.    Results for orders placed or  performed in visit on 04/20/23   Streptococcus A Rapid Screen w/Reflex to PCR - Clinic Collect     Status: Normal    Specimen: Throat; Swab   Result Value Ref Range    Group A Strep antigen Negative Negative   Influenza A & B Antigen - Clinic Collect     Status: Normal    Specimen: Nose; Swab   Result Value Ref Range    Influenza A antigen Negative Negative    Influenza B antigen Negative Negative    Narrative    Test results must be correlated with clinical data. If necessary, results should be confirmed by a molecular assay or viral culture.

## 2023-04-21 LAB — SARS-COV-2 RNA RESP QL NAA+PROBE: NEGATIVE

## 2023-06-02 ENCOUNTER — HEALTH MAINTENANCE LETTER (OUTPATIENT)
Age: 28
End: 2023-06-02

## 2023-09-13 ENCOUNTER — OFFICE VISIT (OUTPATIENT)
Dept: FAMILY MEDICINE | Facility: CLINIC | Age: 28
End: 2023-09-13
Payer: COMMERCIAL

## 2023-09-13 VITALS
RESPIRATION RATE: 15 BRPM | BODY MASS INDEX: 41.95 KG/M2 | HEIGHT: 66 IN | OXYGEN SATURATION: 99 % | WEIGHT: 261 LBS | HEART RATE: 50 BPM | TEMPERATURE: 97.8 F | DIASTOLIC BLOOD PRESSURE: 80 MMHG | SYSTOLIC BLOOD PRESSURE: 116 MMHG

## 2023-09-13 DIAGNOSIS — Z12.4 CERVICAL CANCER SCREENING: ICD-10-CM

## 2023-09-13 DIAGNOSIS — F33.1 MODERATE EPISODE OF RECURRENT MAJOR DEPRESSIVE DISORDER (H): ICD-10-CM

## 2023-09-13 DIAGNOSIS — R74.01 TRANSAMINITIS: Primary | ICD-10-CM

## 2023-09-13 DIAGNOSIS — N91.1 SECONDARY AMENORRHEA: ICD-10-CM

## 2023-09-13 DIAGNOSIS — Z00.00 ROUTINE GENERAL MEDICAL EXAMINATION AT A HEALTH CARE FACILITY: ICD-10-CM

## 2023-09-13 DIAGNOSIS — F10.20 ALCOHOLISM (H): ICD-10-CM

## 2023-09-13 DIAGNOSIS — E66.01 CLASS 3 SEVERE OBESITY WITH SERIOUS COMORBIDITY AND BODY MASS INDEX (BMI) OF 40.0 TO 44.9 IN ADULT, UNSPECIFIED OBESITY TYPE (H): ICD-10-CM

## 2023-09-13 DIAGNOSIS — N92.6 MISSED PERIOD: ICD-10-CM

## 2023-09-13 DIAGNOSIS — E66.813 CLASS 3 SEVERE OBESITY WITH SERIOUS COMORBIDITY AND BODY MASS INDEX (BMI) OF 40.0 TO 44.9 IN ADULT, UNSPECIFIED OBESITY TYPE (H): ICD-10-CM

## 2023-09-13 LAB
ALBUMIN SERPL BCG-MCNC: 4.7 G/DL (ref 3.5–5.2)
ALP SERPL-CCNC: 108 U/L (ref 35–104)
ALT SERPL W P-5'-P-CCNC: 101 U/L (ref 0–50)
ANION GAP SERPL CALCULATED.3IONS-SCNC: 13 MMOL/L (ref 7–15)
AST SERPL W P-5'-P-CCNC: 59 U/L (ref 0–45)
BILIRUB SERPL-MCNC: 0.5 MG/DL
BUN SERPL-MCNC: 10 MG/DL (ref 6–20)
CALCIUM SERPL-MCNC: 9.5 MG/DL (ref 8.6–10)
CHLORIDE SERPL-SCNC: 102 MMOL/L (ref 98–107)
CHOLEST SERPL-MCNC: 219 MG/DL
CLUE CELLS: ABNORMAL
CREAT SERPL-MCNC: 0.67 MG/DL (ref 0.51–0.95)
DEPRECATED HCO3 PLAS-SCNC: 22 MMOL/L (ref 22–29)
EGFRCR SERPLBLD CKD-EPI 2021: >90 ML/MIN/1.73M2
ERYTHROCYTE [DISTWIDTH] IN BLOOD BY AUTOMATED COUNT: 12.5 % (ref 10–15)
ESTRADIOL SERPL-MCNC: 55 PG/ML
FSH SERPL IRP2-ACNC: 7.8 MIU/ML
GLUCOSE SERPL-MCNC: 99 MG/DL (ref 70–99)
HBA1C MFR BLD: 5.5 % (ref 0–5.6)
HCG UR QL: NEGATIVE
HCT VFR BLD AUTO: 41.8 % (ref 35–47)
HDLC SERPL-MCNC: 50 MG/DL
HGB BLD-MCNC: 14.4 G/DL (ref 11.7–15.7)
HIV 1+2 AB+HIV1 P24 AG SERPL QL IA: NONREACTIVE
LDLC SERPL CALC-MCNC: 139 MG/DL
LH SERPL-ACNC: 20.4 MIU/ML
MCH RBC QN AUTO: 30.3 PG (ref 26.5–33)
MCHC RBC AUTO-ENTMCNC: 34.4 G/DL (ref 31.5–36.5)
MCV RBC AUTO: 88 FL (ref 78–100)
NONHDLC SERPL-MCNC: 169 MG/DL
PLATELET # BLD AUTO: 366 10E3/UL (ref 150–450)
POTASSIUM SERPL-SCNC: 4.4 MMOL/L (ref 3.4–5.3)
PROLACTIN SERPL 3RD IS-MCNC: 13 NG/ML (ref 5–23)
PROT SERPL-MCNC: 7.9 G/DL (ref 6.4–8.3)
RBC # BLD AUTO: 4.76 10E6/UL (ref 3.8–5.2)
SODIUM SERPL-SCNC: 137 MMOL/L (ref 136–145)
T PALLIDUM AB SER QL: NONREACTIVE
TRICHOMONAS, WET PREP: ABNORMAL
TRIGL SERPL-MCNC: 151 MG/DL
TSH SERPL DL<=0.005 MIU/L-ACNC: 2.25 UIU/ML (ref 0.3–4.2)
WBC # BLD AUTO: 8.2 10E3/UL (ref 4–11)
WBC'S/HIGH POWER FIELD, WET PREP: ABNORMAL
YEAST, WET PREP: ABNORMAL

## 2023-09-13 PROCEDURE — 84443 ASSAY THYROID STIM HORMONE: CPT | Performed by: INTERNAL MEDICINE

## 2023-09-13 PROCEDURE — 99214 OFFICE O/P EST MOD 30 MIN: CPT | Mod: 25 | Performed by: INTERNAL MEDICINE

## 2023-09-13 PROCEDURE — 90715 TDAP VACCINE 7 YRS/> IM: CPT | Performed by: INTERNAL MEDICINE

## 2023-09-13 PROCEDURE — 96127 BRIEF EMOTIONAL/BEHAV ASSMT: CPT | Performed by: INTERNAL MEDICINE

## 2023-09-13 PROCEDURE — G0124 SCREEN C/V THIN LAYER BY MD: HCPCS | Performed by: PATHOLOGY

## 2023-09-13 PROCEDURE — 83001 ASSAY OF GONADOTROPIN (FSH): CPT | Performed by: INTERNAL MEDICINE

## 2023-09-13 PROCEDURE — 80053 COMPREHEN METABOLIC PANEL: CPT | Performed by: INTERNAL MEDICINE

## 2023-09-13 PROCEDURE — 84146 ASSAY OF PROLACTIN: CPT | Performed by: INTERNAL MEDICINE

## 2023-09-13 PROCEDURE — 80061 LIPID PANEL: CPT | Performed by: INTERNAL MEDICINE

## 2023-09-13 PROCEDURE — 90472 IMMUNIZATION ADMIN EACH ADD: CPT | Performed by: INTERNAL MEDICINE

## 2023-09-13 PROCEDURE — 85027 COMPLETE CBC AUTOMATED: CPT | Performed by: INTERNAL MEDICINE

## 2023-09-13 PROCEDURE — 90471 IMMUNIZATION ADMIN: CPT | Performed by: INTERNAL MEDICINE

## 2023-09-13 PROCEDURE — 87389 HIV-1 AG W/HIV-1&-2 AB AG IA: CPT | Performed by: INTERNAL MEDICINE

## 2023-09-13 PROCEDURE — 86780 TREPONEMA PALLIDUM: CPT | Performed by: INTERNAL MEDICINE

## 2023-09-13 PROCEDURE — 84403 ASSAY OF TOTAL TESTOSTERONE: CPT | Performed by: INTERNAL MEDICINE

## 2023-09-13 PROCEDURE — 90686 IIV4 VACC NO PRSV 0.5 ML IM: CPT | Performed by: INTERNAL MEDICINE

## 2023-09-13 PROCEDURE — 87591 N.GONORRHOEAE DNA AMP PROB: CPT | Performed by: INTERNAL MEDICINE

## 2023-09-13 PROCEDURE — 87210 SMEAR WET MOUNT SALINE/INK: CPT | Performed by: INTERNAL MEDICINE

## 2023-09-13 PROCEDURE — 83036 HEMOGLOBIN GLYCOSYLATED A1C: CPT | Performed by: INTERNAL MEDICINE

## 2023-09-13 PROCEDURE — 81025 URINE PREGNANCY TEST: CPT | Performed by: INTERNAL MEDICINE

## 2023-09-13 PROCEDURE — 99395 PREV VISIT EST AGE 18-39: CPT | Mod: 25 | Performed by: INTERNAL MEDICINE

## 2023-09-13 PROCEDURE — 83002 ASSAY OF GONADOTROPIN (LH): CPT | Performed by: INTERNAL MEDICINE

## 2023-09-13 PROCEDURE — G0123 SCREEN CERV/VAG THIN LAYER: HCPCS | Performed by: INTERNAL MEDICINE

## 2023-09-13 PROCEDURE — 87491 CHLMYD TRACH DNA AMP PROBE: CPT | Performed by: INTERNAL MEDICINE

## 2023-09-13 PROCEDURE — 36415 COLL VENOUS BLD VENIPUNCTURE: CPT | Performed by: INTERNAL MEDICINE

## 2023-09-13 PROCEDURE — 82670 ASSAY OF TOTAL ESTRADIOL: CPT | Performed by: INTERNAL MEDICINE

## 2023-09-13 ASSESSMENT — PATIENT HEALTH QUESTIONNAIRE - PHQ9
SUM OF ALL RESPONSES TO PHQ QUESTIONS 1-9: 11
SUM OF ALL RESPONSES TO PHQ QUESTIONS 1-9: 11
10. IF YOU CHECKED OFF ANY PROBLEMS, HOW DIFFICULT HAVE THESE PROBLEMS MADE IT FOR YOU TO DO YOUR WORK, TAKE CARE OF THINGS AT HOME, OR GET ALONG WITH OTHER PEOPLE: VERY DIFFICULT

## 2023-09-13 ASSESSMENT — ENCOUNTER SYMPTOMS
CHILLS: 0
ARTHRALGIAS: 0
FEVER: 0
NERVOUS/ANXIOUS: 1
PALPITATIONS: 0
PARESTHESIAS: 0
HEADACHES: 0
HEMATOCHEZIA: 1
FREQUENCY: 0
EYE PAIN: 0
SHORTNESS OF BREATH: 0
DYSURIA: 0
MYALGIAS: 1
SORE THROAT: 0
JOINT SWELLING: 0
DIARRHEA: 0
DIZZINESS: 1
HEMATURIA: 0
WEAKNESS: 0
NAUSEA: 0
BREAST MASS: 0
CONSTIPATION: 0
HEARTBURN: 0
ABDOMINAL PAIN: 0
COUGH: 0

## 2023-09-13 ASSESSMENT — PAIN SCALES - GENERAL: PAINLEVEL: MODERATE PAIN (4)

## 2023-09-13 NOTE — NURSING NOTE
Prior to immunization administration, verified patients identity using patient s name and date of birth. Please see Immunization Activity for additional information.     Screening Questionnaire for Adult Immunization    Are you sick today?   No   Do you have allergies to medications, food, a vaccine component or latex?   No   Have you ever had a serious reaction after receiving a vaccination?   No   Do you have a long-term health problem with heart, lung, kidney, or metabolic disease (e.g., diabetes), asthma, a blood disorder, no spleen, complement component deficiency, a cochlear implant, or a spinal fluid leak?  Are you on long-term aspirin therapy?   No   Do you have cancer, leukemia, HIV/AIDS, or any other immune system problem?   No   Do you have a parent, brother, or sister with an immune system problem?   No   In the past 3 months, have you taken medications that affect  your immune system, such as prednisone, other steroids, or anticancer drugs; drugs for the treatment of rheumatoid arthritis, Crohn s disease, or psoriasis; or have you had radiation treatments?   No   Have you had a seizure, or a brain or other nervous system problem?   No   During the past year, have you received a transfusion of blood or blood    products, or been given immune (gamma) globulin or antiviral drug?   No   For women: Are you pregnant or is there a chance you could become       pregnant during the next month?   No   Have you received any vaccinations in the past 4 weeks?   No     Immunization questionnaire answers were all negative.      Patient instructed to remain in clinic for 15 minutes afterwards, and to report any adverse reactions.     Screening performed by Edyta Carbajal MA on 9/13/2023 at 11:48 AM.

## 2023-09-13 NOTE — PROGRESS NOTES
SUBJECTIVE:   CC: MALAIKA is an 27 year old who presents for preventive health visit.       9/13/2023    10:40 AM   Additional Questions   Roomed by Soraida Joy         9/13/2023    10:40 AM   Patient Reported Additional Medications   Patient reports taking the following new medications Med Rec Meds       Healthy Habits:     Getting at least 3 servings of Calcium per day:  Yes    Bi-annual eye exam:  NO    Dental care twice a year:  NO    Sleep apnea or symptoms of sleep apnea:  Daytime drowsiness    Diet:  Regular (no restrictions)    Frequency of exercise:  2-3 days/week    Duration of exercise:  30-45 minutes    Taking medications regularly:  Yes    Medication side effects:  None    Additional concerns today:  No      Today's PHQ-9 Score:       9/13/2023    10:25 AM   PHQ-9 SCORE   PHQ-9 Total Score MyChart 11 (Moderate depression)   PHQ-9 Total Score 11     Hasn't gotten period since April 2023.  Has been really exhausted.  Has been affecting her worklife.  It has been hard to go into work because she has been so fatigued.  For the last two months, she has been randomly dizzy- wondering if this is vertigo.     Has been really, really tired- waking up exhausted.    No headaches, no pelvic pain.    Has had some lower back pain.    Before April 2023; did get monthly periods although was somewhat irregular (not consistent day).      Have you ever done Advance Care Planning? (For example, a Health Directive, POLST, or a discussion with a medical provider or your loved ones about your wishes): No, advance care planning information given to patient to review.  Patient declined advance care planning discussion at this time.    Social History     Tobacco Use    Smoking status: Never    Smokeless tobacco: Never   Substance Use Topics    Alcohol use: Yes     Comment: socially           9/13/2023    10:31 AM   Alcohol Use   Prescreen: >3 drinks/day or >7 drinks/week? Yes   AUDIT SCORE  6         9/13/2023    10:31 AM    AUDIT - Alcohol Use Disorders Identification Test - Reproduced from the World Health Organization Audit 2001 (Second Edition)   1.  How often do you have a drink containing alcohol? 2 to 3 times a week   2.  How many drinks containing alcohol do you have on a typical day when you are drinking? 3 or 4   3.  How often do you have five or more drinks on one occasion? Monthly   4.  How often during the last year have you found that you were not able to stop drinking once you had started? Never   5.  How often during the last year have you failed to do what was normally expected of you because of drinking? Never   6.  How often during the last year have you needed a first drink in the morning to get yourself going after a heavy drinking session? Never   7.  How often during the last year have you had a feeling of guilt or remorse after drinking? Never   8.  How often during the last year have you been unable to remember what happened the night before because of your drinking? Never   9.  Have you or someone else been injured because of your drinking? No   10. Has a relative, friend, doctor or other health care worker been concerned about your drinking or suggested you cut down? No   TOTAL SCORE 6     Reviewed orders with patient.  Reviewed health maintenance and updated orders accordingly - Yes  Lab work is in process    Breast Cancer Screenin/13/2023    10:31 AM   Breast CA Risk Assessment (FHS-7)   Do you have a family history of breast, colon, or ovarian cancer? No / Unknown         Patient under 40 years of age: Routine Mammogram Screening not recommended.   Pertinent mammograms are reviewed under the imaging tab.    History of abnormal Pap smear: Last 3 Pap and HPV Results:   Has required colposcopy x2 at [facility].      2019    12:18 PM   PAP / HPV   PAP (Historical) LSIL          2019    12:18 PM   PAP / HPV   PAP (Historical) LSIL      Reviewed and updated as needed this visit by  "clinical staff   Tobacco  Allergies  Meds  Problems  Med Hx  Surg Hx  Fam Hx          Reviewed and updated as needed this visit by Provider   Tobacco  Allergies  Meds  Problems  Med Hx  Surg Hx  Fam Hx             Review of Systems   Constitutional:  Negative for chills and fever.   HENT:  Negative for congestion, ear pain, hearing loss and sore throat.    Eyes:  Negative for pain and visual disturbance.   Respiratory:  Negative for cough and shortness of breath.    Cardiovascular:  Negative for chest pain, palpitations and peripheral edema.   Gastrointestinal:  Positive for hematochezia. Negative for abdominal pain, constipation, diarrhea, heartburn and nausea.   Breasts:  Negative for tenderness, breast mass and discharge.   Genitourinary:  Positive for vaginal discharge. Negative for dysuria, frequency, genital sores, hematuria, pelvic pain, urgency and vaginal bleeding.   Musculoskeletal:  Positive for myalgias. Negative for arthralgias and joint swelling.   Skin:  Negative for rash.   Neurological:  Positive for dizziness. Negative for weakness, headaches and paresthesias.   Psychiatric/Behavioral:  Positive for mood changes. The patient is nervous/anxious.         OBJECTIVE:   /80 (BP Location: Right arm, Patient Position: Sitting, Cuff Size: Adult Large)   Pulse 50   Temp 97.8  F (36.6  C) (Temporal)   Resp 15   Ht 1.67 m (5' 5.75\")   Wt 118.4 kg (261 lb)   LMP 04/14/2023   SpO2 99%   BMI 42.45 kg/m    Physical Exam  GENERAL: healthy, alert and no distress  EYES: Eyes grossly normal to inspection, PERRL and conjunctivae and sclerae normal  HENT: ear canals and TM's normal, nose and mouth without ulcers or lesions  NECK: no adenopathy, no asymmetry, masses, or scars and thyroid normal to palpation  RESP: lungs clear to auscultation - no rales, rhonchi or wheezes  CV: regular rate and rhythm, normal S1 S2, no S3 or S4, no murmur, click or rub, no peripheral edema and peripheral " pulses strong  ABDOMEN: soft, nontender, no hepatosplenomegaly, no masses and bowel sounds normal   (female): normal female external genitalia, normal urethral meatus, vaginal mucosa pink, moist, well rugated, and normal cervix/adnexa/uterus without masses or discharge  MS: no gross musculoskeletal defects noted, no edema  SKIN: no suspicious lesions or rashes  NEURO: Normal strength and tone, mentation intact and speech normal  PSYCH: mentation appears normal, affect normal/bright    Diagnostic Test Results:  Labs reviewed in Epic    ASSESSMENT/PLAN:   Angelia was seen today for physical, late period, dizziness and tailbone pain.    Diagnoses and all orders for this visit:    Routine general medical examination at a health care facility  -     Lipid panel reflex to direct LDL Fasting; Future  -     Hemoglobin A1c; Future  -     Treponema Abs w Reflex to RPR and Titer; Future  -     HIV Antigen Antibody Combo Cascade; Future  -     Chlamydia trachomatis PCR; Future  -     Neisseria gonorrhoeae PCR; Future  -     Wet prep - Clinic Collect  -     Lipid panel reflex to direct LDL Fasting  -     Hemoglobin A1c  -     Treponema Abs w Reflex to RPR and Titer  -     HIV Antigen Antibody Combo Cascade  -     Chlamydia trachomatis PCR  -     Neisseria gonorrhoeae PCR    Cervical cancer screening  -     Pap Screen reflex to HPV if ASCUS - recommend age 25 - 29    Moderate episode of recurrent major depressive disorder (H)  Comments:  Has had more mood fluctuations in setting of fatigue and secondary amenorrhea.    Class 3 severe obesity with serious comorbidity and body mass index (BMI) of 40.0 to 44.9 in adult, unspecified obesity type (H)  Comments:  Associated with prediabetes.  Will follow up with me in 1 month to discuss weight loss; possible GLP-1 agonist.  Orders:  -     Comprehensive metabolic panel (BMP + Alb, Alk Phos, ALT, AST, Total. Bili, TP); Future    Missed period  -     HCG qualitative urine; Future  -      "HCG qualitative urine    Secondary amenorrhea  Comments:  Urine HCG neg.  Check FSH, LH, estradiol, testosterone, prolactin, TSH, A1C.  If normal, pelvic US.  Orders:  -     TSH with free T4 reflex; Future  -     Prolactin; Future  -     Follicle stimulating hormone; Future  -     CBC with platelets; Future  -     Estradiol; Future  -     Testosterone total; Future  -     Luteinizing Hormone; Future  -     TSH with free T4 reflex  -     Prolactin  -     Follicle stimulating hormone  -     CBC with platelets  -     Estradiol  -     Testosterone total  -     Luteinizing Hormone    Alcoholism (H)  Comments:  Noted in history- did not address today.  Will discuss more at follow up.  Orders:  -     Comprehensive metabolic panel (BMP + Alb, Alk Phos, ALT, AST, Total. Bili, TP); Future    Other orders  -     REVIEW OF HEALTH MAINTENANCE PROTOCOL ORDERS  -     TDAP 10-64Y (ADACEL,BOOSTRIX)  -     INFLUENZA VACCINE IM > 6 MONTHS VALENT IIV4 (AFLURIA/FLUZONE)        Patient has been advised of split billing requirements and indicates understanding: Yes      COUNSELING:  Reviewed preventive health counseling, as reflected in patient instructions      BMI:   Estimated body mass index is 42.45 kg/m  as calculated from the following:    Height as of this encounter: 1.67 m (5' 5.75\").    Weight as of this encounter: 118.4 kg (261 lb).   Weight management plan: following up with me to discuss weight loss      She reports that she has never smoked. She has never used smokeless tobacco.          Nikki Platt, DO  [facility]  "

## 2023-09-14 LAB
C TRACH DNA SPEC QL NAA+PROBE: NEGATIVE
N GONORRHOEA DNA SPEC QL NAA+PROBE: NEGATIVE

## 2023-09-15 ENCOUNTER — PATIENT OUTREACH (OUTPATIENT)
Dept: FAMILY MEDICINE | Facility: CLINIC | Age: 28
End: 2023-09-15
Payer: COMMERCIAL

## 2023-09-15 DIAGNOSIS — R87.612 PAPANICOLAOU SMEAR OF CERVIX WITH LOW GRADE SQUAMOUS INTRAEPITHELIAL LESION (LGSIL): Primary | ICD-10-CM

## 2023-09-15 LAB
BKR LAB AP GYN ADEQUACY: ABNORMAL
BKR LAB AP GYN INTERPRETATION: ABNORMAL
BKR LAB AP HPV REFLEX: ABNORMAL
BKR LAB AP LMP: ABNORMAL
BKR LAB AP PREVIOUS ABNL DX: ABNORMAL
BKR LAB AP PREVIOUS ABNORMAL: ABNORMAL
PATH REPORT.COMMENTS IMP SPEC: ABNORMAL
PATH REPORT.COMMENTS IMP SPEC: ABNORMAL
PATH REPORT.RELEVANT HX SPEC: ABNORMAL
TESTOST SERPL-MCNC: 53 NG/DL (ref 8–60)

## 2023-09-18 ENCOUNTER — TELEPHONE (OUTPATIENT)
Dept: FAMILY MEDICINE | Facility: CLINIC | Age: 28
End: 2023-09-18
Payer: COMMERCIAL

## 2023-09-18 NOTE — TELEPHONE ENCOUNTER
Patient given Dr Platt's advice.  Still just spotting so will plan to go in.  Xena Camacho RN  [facility]

## 2023-09-18 NOTE — TELEPHONE ENCOUNTER
Yes- I think she should still have this done.  Especially if it is just spotting.  If the spotting increases to the level of a regular menstrual cycle, then I would recommend rescheduling.    Nikki Platt, DO  Internal Medicine - Pediatrics Physician  [facility]

## 2023-09-19 ENCOUNTER — ANCILLARY PROCEDURE (OUTPATIENT)
Dept: ULTRASOUND IMAGING | Facility: CLINIC | Age: 28
End: 2023-09-19
Attending: INTERNAL MEDICINE
Payer: COMMERCIAL

## 2023-09-19 DIAGNOSIS — R74.01 TRANSAMINITIS: ICD-10-CM

## 2023-09-19 PROCEDURE — 76705 ECHO EXAM OF ABDOMEN: CPT

## 2023-12-05 ENCOUNTER — TELEPHONE (OUTPATIENT)
Dept: FAMILY MEDICINE | Facility: CLINIC | Age: 28
End: 2023-12-05
Payer: COMMERCIAL

## 2023-12-05 NOTE — TELEPHONE ENCOUNTER
New Medication Request    Contacts         Type Contact Phone/Fax    12/05/2023 03:32 PM CST Phone (Incoming) MALAIKA Epstein (Self) [phone] (M)            What medication are you requesting?: Hydroxyzine 10mg once daily as needed for anxiety    Reason for medication request: Patient is going on a flight and wishing for medication to take on the flight. Also wondering if provider would maybe prescribe something stronger to take on the plane to put them to sleep during flight    Have you taken this medication before?: Yes: She took one today and is out    Controlled Substance Agreement on file:   CSA -- Patient Level:    CSA: None found at the patient level.         Patient offered an appointment? Yes: They are already scheduled to see PCP on 1/23/2024    Preferred Pharmacy:     [id number] - [place], [place] - [address] [place] [address]  Phone: [phone] Fax: [phone]      Could we send this information to you in [facility] or would you prefer to receive a phone call?:   Patient would prefer a phone call   Okay to leave a detailed message?: Yes at Cell number on file:    Telephone Information:   Mobile [phone]

## 2023-12-06 NOTE — TELEPHONE ENCOUNTER
Yes- E-visit would be perfect.    Nikki Platt, DO  Internal Medicine - Pediatrics Physician  [facility]

## 2023-12-06 NOTE — TELEPHONE ENCOUNTER
Dr. Platt: last visit 9/13/23, last prescribed in 2021 by Elian.  Unless you have discussed this medication with her I will recommend an eVisit.    Sheron VAZQUEZ RN  [facility]

## 2024-01-04 ENCOUNTER — NURSE TRIAGE (OUTPATIENT)
Dept: NURSING | Facility: CLINIC | Age: 29
End: 2024-01-04
Payer: COMMERCIAL

## 2024-01-04 NOTE — TELEPHONE ENCOUNTER
Nurse Triage SBAR    Is this a 2nd Level Triage? NO    Situation:   Back pain    Background:   Pt has had back pain for about a month now that's mild.    Assessment:   Tonight, she was watching a basketball game.  She was sitting when she had sudden onset severe back pain.  She doesn't recall injuring her back.  She has a hard time standing straight.  The only time she doesn't have pain is when she's sitting straight up.    As soon as she bends the smallest amount, she gets the sharp severe pain in her lower left back.  She took 1000mg of motrin an hour ago with no relief yet.  No shooting pain or radiating pain.  No numbness or weakness.    Protocol Recommended Disposition:   See PCP Within 2 Weeks    Recommendation:   Advised patient to continue to monitor her back pain.  If she continues to have severe back pain with no improvement for another hour after taking motrin, she should be seen in the ED tonight.  If it is better, okay to be seen in clinic within 2 weeks.  Pt has an event on Friday and plans to be seen in clinic in the morning today if she continues to have pain.  Care advice given.  Informed patient that she can only take 200mg more of motrin as she shouldn't exceed 1200mg a day.  Advised to take tylenol instead and reviewd dosing and frequency.  Pt verbalized understanding.    Nisa Mishra, RN, BSN Nurse Triage Advisor 1/4/2024 12:57 AM       Reason for Disposition   Back pain present > 2 weeks    Additional Information   Negative: Passed out (i.e., lost consciousness, collapsed and was not responding)   Negative: Shock suspected (e.g., cold/pale/clammy skin, too weak to stand, low BP, rapid pulse)   Negative: Sounds like a life-threatening emergency to the triager   Negative: [1] SEVERE back pain (e.g., excruciating) AND [2] sudden onset AND [3] age > 60 years   Negative: [1] Unable to urinate (or only a few drops) > 4 hours AND [2] bladder feels very full (e.g., palpable bladder or strong urge to  "urinate)   Negative: [1] Loss of bladder or bowel control (urine or bowel incontinence; wetting self, leaking stool) AND [2] new-onset   Negative: Numbness in groin or rectal area (i.e., loss of sensation)   Negative: [1] SEVERE abdominal pain AND [2] present > 1 hour   Negative: [1] Abdominal pain AND [2] age > 60 years   Negative: Weakness of a leg or foot (e.g., unable to bear weight, dragging foot)   Negative: Unable to walk   Negative: Patient sounds very sick or weak to the triager   Negative: [1] SEVERE back pain (e.g., excruciating, unable to do any normal activities) AND [2] not improved 2 hours after pain medicine   Negative: [1] Pain radiates into the thigh or further down the leg AND [2] both legs   Negative: [1] Fever > 100.0 F (37.8 C) AND [2] flank pain (i.e., in side, below ribs and above hip)   Negative: [1] Pain or burning with passing urine (urination) AND [2] flank pain (i.e., in side, below ribs and above hip)   Negative: Numbness in a leg or foot (i.e., loss of sensation)   Negative: [1] Numbness in an arm or hand (i.e., loss of sensation) AND [2] upper back pain   Negative: High-risk adult (e.g., history of cancer, HIV, or IV drug use)   Negative: Soft tissue infection (e.g., abscess, cellulitis) or other serious infection (e.g., bacteremia) in last 2 weeks   Negative: [1] Fever AND [2] no symptoms of UTI  (Exception: Has generalized muscle pains, not localized back pain.)   Negative: Rash in same area as pain (may be described as \"small blisters\")   Negative: Blood in urine (red, pink, or tea-colored)   Negative: [1] MODERATE back pain (e.g., interferes with normal activities) AND [2] present > 3 days   Negative: [1] Pain radiates into the thigh or further down the leg AND [2] one leg   Negative: [1] Age > 50 AND [2] no history of prior similar back pain    Protocols used: Back Pain-A-AH    "

## 2024-01-23 ENCOUNTER — OFFICE VISIT (OUTPATIENT)
Dept: FAMILY MEDICINE | Facility: CLINIC | Age: 29
End: 2024-01-23
Payer: COMMERCIAL

## 2024-01-23 VITALS
OXYGEN SATURATION: 97 % | RESPIRATION RATE: 20 BRPM | WEIGHT: 260.6 LBS | BODY MASS INDEX: 43.42 KG/M2 | TEMPERATURE: 97.3 F | SYSTOLIC BLOOD PRESSURE: 120 MMHG | DIASTOLIC BLOOD PRESSURE: 80 MMHG | HEIGHT: 65 IN | HEART RATE: 86 BPM

## 2024-01-23 DIAGNOSIS — K76.0 HEPATIC STEATOSIS: Primary | ICD-10-CM

## 2024-01-23 DIAGNOSIS — F41.9 ANXIETY: ICD-10-CM

## 2024-01-23 DIAGNOSIS — Z12.4 CERVICAL CANCER SCREENING: ICD-10-CM

## 2024-01-23 DIAGNOSIS — F32.1 CURRENT MODERATE EPISODE OF MAJOR DEPRESSIVE DISORDER WITHOUT PRIOR EPISODE (H): ICD-10-CM

## 2024-01-23 DIAGNOSIS — E66.01 CLASS 3 SEVERE OBESITY WITH SERIOUS COMORBIDITY AND BODY MASS INDEX (BMI) OF 40.0 TO 44.9 IN ADULT, UNSPECIFIED OBESITY TYPE (H): ICD-10-CM

## 2024-01-23 DIAGNOSIS — M54.9 BILATERAL BACK PAIN, UNSPECIFIED BACK LOCATION, UNSPECIFIED CHRONICITY: ICD-10-CM

## 2024-01-23 DIAGNOSIS — E66.813 CLASS 3 SEVERE OBESITY WITH SERIOUS COMORBIDITY AND BODY MASS INDEX (BMI) OF 40.0 TO 44.9 IN ADULT, UNSPECIFIED OBESITY TYPE (H): ICD-10-CM

## 2024-01-23 PROBLEM — F10.20 ALCOHOLISM (H): Status: RESOLVED | Noted: 2023-09-13 | Resolved: 2024-01-23

## 2024-01-23 LAB
ALBUMIN SERPL BCG-MCNC: 4.5 G/DL (ref 3.5–5.2)
ALP SERPL-CCNC: 113 U/L (ref 40–150)
ALT SERPL W P-5'-P-CCNC: 131 U/L (ref 0–50)
ANION GAP SERPL CALCULATED.3IONS-SCNC: 9 MMOL/L (ref 7–15)
AST SERPL W P-5'-P-CCNC: 69 U/L (ref 0–45)
BILIRUB SERPL-MCNC: 0.4 MG/DL
BUN SERPL-MCNC: 8.2 MG/DL (ref 6–20)
CALCIUM SERPL-MCNC: 9.5 MG/DL (ref 8.6–10)
CHLORIDE SERPL-SCNC: 103 MMOL/L (ref 98–107)
CREAT SERPL-MCNC: 0.74 MG/DL (ref 0.51–0.95)
DEPRECATED HCO3 PLAS-SCNC: 27 MMOL/L (ref 22–29)
EGFRCR SERPLBLD CKD-EPI 2021: >90 ML/MIN/1.73M2
GLUCOSE SERPL-MCNC: 103 MG/DL (ref 70–99)
POTASSIUM SERPL-SCNC: 4 MMOL/L (ref 3.4–5.3)
PROT SERPL-MCNC: 7.8 G/DL (ref 6.4–8.3)
SODIUM SERPL-SCNC: 139 MMOL/L (ref 135–145)

## 2024-01-23 PROCEDURE — 91320 SARSCV2 VAC 30MCG TRS-SUC IM: CPT | Performed by: INTERNAL MEDICINE

## 2024-01-23 PROCEDURE — 90480 ADMN SARSCOV2 VAC 1/ONLY CMP: CPT | Performed by: INTERNAL MEDICINE

## 2024-01-23 PROCEDURE — 80053 COMPREHEN METABOLIC PANEL: CPT | Performed by: INTERNAL MEDICINE

## 2024-01-23 PROCEDURE — 36415 COLL VENOUS BLD VENIPUNCTURE: CPT | Performed by: INTERNAL MEDICINE

## 2024-01-23 PROCEDURE — 99215 OFFICE O/P EST HI 40 MIN: CPT | Mod: 25 | Performed by: INTERNAL MEDICINE

## 2024-01-23 RX ORDER — METHYLPREDNISOLONE 4 MG
TABLET, DOSE PACK ORAL
COMMUNITY
Start: 2024-01-04 | End: 2024-01-23

## 2024-01-23 RX ORDER — CYCLOBENZAPRINE HCL 10 MG
10 TABLET ORAL 3 TIMES DAILY PRN
Qty: 30 TABLET | Refills: 0 | Status: SHIPPED | OUTPATIENT
Start: 2024-01-23

## 2024-01-23 RX ORDER — HYDROXYZINE HYDROCHLORIDE 10 MG/1
10 TABLET, FILM COATED ORAL 3 TIMES DAILY PRN
COMMUNITY
End: 2024-01-23

## 2024-01-23 RX ORDER — HYDROXYZINE HYDROCHLORIDE 10 MG/1
10 TABLET, FILM COATED ORAL 3 TIMES DAILY PRN
Qty: 90 TABLET | Refills: 3 | Status: SHIPPED | OUTPATIENT
Start: 2024-01-23

## 2024-01-23 RX ORDER — CYCLOBENZAPRINE HCL 10 MG
TABLET ORAL
COMMUNITY
Start: 2024-01-04 | End: 2024-01-23

## 2024-01-23 ASSESSMENT — ANXIETY QUESTIONNAIRES
3. WORRYING TOO MUCH ABOUT DIFFERENT THINGS: MORE THAN HALF THE DAYS
GAD7 TOTAL SCORE: 9
8. IF YOU CHECKED OFF ANY PROBLEMS, HOW DIFFICULT HAVE THESE MADE IT FOR YOU TO DO YOUR WORK, TAKE CARE OF THINGS AT HOME, OR GET ALONG WITH OTHER PEOPLE?: SOMEWHAT DIFFICULT
1. FEELING NERVOUS, ANXIOUS, OR ON EDGE: NEARLY EVERY DAY
GAD7 TOTAL SCORE: 9
5. BEING SO RESTLESS THAT IT IS HARD TO SIT STILL: NOT AT ALL
6. BECOMING EASILY ANNOYED OR IRRITABLE: MORE THAN HALF THE DAYS
7. FEELING AFRAID AS IF SOMETHING AWFUL MIGHT HAPPEN: NOT AT ALL
IF YOU CHECKED OFF ANY PROBLEMS ON THIS QUESTIONNAIRE, HOW DIFFICULT HAVE THESE PROBLEMS MADE IT FOR YOU TO DO YOUR WORK, TAKE CARE OF THINGS AT HOME, OR GET ALONG WITH OTHER PEOPLE: SOMEWHAT DIFFICULT
2. NOT BEING ABLE TO STOP OR CONTROL WORRYING: MORE THAN HALF THE DAYS
GAD7 TOTAL SCORE: 9
7. FEELING AFRAID AS IF SOMETHING AWFUL MIGHT HAPPEN: NOT AT ALL
4. TROUBLE RELAXING: NOT AT ALL

## 2024-01-23 ASSESSMENT — PATIENT HEALTH QUESTIONNAIRE - PHQ9
SUM OF ALL RESPONSES TO PHQ QUESTIONS 1-9: 8
10. IF YOU CHECKED OFF ANY PROBLEMS, HOW DIFFICULT HAVE THESE PROBLEMS MADE IT FOR YOU TO DO YOUR WORK, TAKE CARE OF THINGS AT HOME, OR GET ALONG WITH OTHER PEOPLE: VERY DIFFICULT
SUM OF ALL RESPONSES TO PHQ QUESTIONS 1-9: 8

## 2024-01-23 ASSESSMENT — PAIN SCALES - GENERAL: PAINLEVEL: EXTREME PAIN (8)

## 2024-01-23 NOTE — PROGRESS NOTES
Assessment & Plan     (K76.0) Hepatic steatosis  (primary encounter diagnosis)  Comment: Confirmed on abd US.  Drastically reduced alcohol intake (<1 drink per week now); repeat LFT's today.  Plan: Comprehensive metabolic panel (BMP + Alb, Alk         Phos, ALT, AST, Total. Bili, TP)    (Z12.4) Cervical cancer screening  Comment: LSIL on Pap 9/13/23; due for colposcopy (has had prior done at [facility])- prefers [facility], so referral placed.  Plan: Ob/Gyn  Referral    (F32.1) Current moderate episode of major depressive disorder without prior episode (H)  Comment: Not currently on medications, not currently seeing a therapist, but would like to see one specializing in healthy relationship with food.    (E66.01,  Z68.41) Class 3 severe obesity with serious comorbidity and body mass index (BMI) of 40.0 to 44.9 in adult, unspecified obesity type (H)  Comment: Complicated by hepatic steatosis, back pain- would like to try Zepbound (ordered).  Would also like to see [facility] clinic- referral placed.  Plan: Adult Comprehensive Weight Management         Referral, tirzepatide-Weight Management         (ZEPBOUND) 2.5 MG/0.5ML prefilled pen  - Recommended sleep study to evaluate for obstructive sleep apnea given daytime fatigue- she declined this for now, will start with other items from today and then can readdress going forward    (F41.9) Anxiety  Comment: Refilled hydroxyzine.  Plan: hydrOXYzine HCl (ATARAX) 10 MG tablet    (M54.9) Bilateral back pain, unspecified back location, unspecified chronicity  Comment: Bilateral low back pain into buttocks; not having red flag symptoms at this point.  Sounds Msk, not nerve like.  Neg straight leg raise.  Declined PT for now.  Plan: cyclobenzaprine (FLEXERIL) 10 MG tablet    - Discussed back pain exercises to try  - Ok to use ibuprofen as needed  - RTC in ~1 month to follow up      I spent a total of 44 minutes on the day of the visit.   Time spent by me doing chart  review, history and exam, documentation and further activities per the note      Patient Instructions   - I recommend checking out the [employer] billing  calculator to see how much a colposcopy would cost    - Check sleep study cost    - Check labs today    - Start Zepbound- biggest side effects are stomach fullness, discomfort, and nausea- we will reduce the risk of these side effects by cutting portions in half at each meal (and then eat a little more if still hungry) and by slowly increasing the dose.  The other big side effect is constipation, keep an eye on this and try Miralax or senna if needed to have a soft, easy bowel movement each day.    - If this isn't covered, then we will try a different medicine for weight loss    - I will also refer you to the weight mgmt clinic    Edita ADI is a 28 year old, presenting for the following health issues:  Follow Up      1/23/2024     2:43 PM   Additional Questions   Roomed by Marlen   Accompanied by alone         1/23/2024     2:43 PM   Patient Reported Additional Medications   Patient reports taking the following new medications none     History of Present Illness       Back Pain:  She presents for follow up of back pain. Patient's back pain is a recurring problem.  Location of back pain:  Right lower back, left lower back, right buttock, left buttock, right hip and left hip  Description of back pain: burning and sharp  Back pain spreads: right buttocks, left buttocks, right thigh and left thigh    Since patient first noticed back pain, pain is: always present, but gets better and worse  Does back pain interfere with her job:  Yes       Mental Health Follow-up:  Patient presents to follow-up on Depression & Anxiety.Patient's depression since last visit has been:  No change  The patient is having other symptoms associated with depression.  Patient's anxiety since last visit has been:  Worse  The patient is having other symptoms associated with  "anxiety.  Any significant life events: grief or loss and health concerns  Patient is feeling anxious or having panic attacks.  Patient has no concerns about alcohol or drug use.    Reason for visit:  Follow up for fatty liver and period, weight loss medication, sever lower back, prescription refill for anxiety    She eats 0-1 servings of fruits and vegetables daily.She consumes 1 sweetened beverage(s) daily.She exercises with enough effort to increase her heart rate 30 to 60 minutes per day.  She exercises with enough effort to increase her heart rate 3 or less days per week.   She is taking medications regularly.     Has been having some back pain as well- started to hurt in December.  Went to [facility] room on 24 because pain was so extreme she couldn't walk.  Didn't feel like a pinched nerve because had no radiation.  The next day, the pain all shifted to the right side.  The following day, the pain spread to both sides.    She was given Medrol dose pack and Flexeril- started to feel better.  A person close to her . Because she needed to be there at the  and was having back pain.    Pain did feel better after the steroids- but then  pain returned about 5 days ago.  Can't bend forward because hurts so bad.  Sitting up even hurts.  Has been doing some light stretching each morning and night to help it get better.    Tired all the time still.  Thinks probably a combo- full time work, now back at school studying, maybe some depression.    Also wanted to talk about weight loss.    Period- now getting monthly.        Objective    /80 (BP Location: Right arm, Patient Position: Sitting, Cuff Size: Adult Regular)   Pulse 86   Temp 97.3  F (36.3  C) (Temporal)   Resp 20   Ht 1.651 m (5' 5\")   Wt 118.2 kg (260 lb 9.6 oz)   LMP 2023   SpO2 97%   BMI 43.37 kg/m    Body mass index is 43.37 kg/m .  Physical Exam   GENERAL: alert and no distress  RESP: lungs clear to auscultation - no rales, " rhonchi or wheezes  CV: regular rate and rhythm, normal S1 S2, no S3 or S4, no murmur, click or rub, no peripheral edema  BACK: no CVA tenderness, no paralumbar tenderness  Comprehensive back pain exam:  Lower extremity strength functional and equal on both sides, Lower extremity reflexes within normal limits bilaterally, and Straight leg raise negative bilaterally; Normal gait.            Signed Electronically by: Nikki Platt DO

## 2024-01-23 NOTE — PATIENT INSTRUCTIONS
- I recommend checking out the [employer] billing  calculator to see how much a colposcopy would cost    - Check sleep study cost    - Check labs today    - Start Zepbound- biggest side effects are stomach fullness, discomfort, and nausea- we will reduce the risk of these side effects by cutting portions in half at each meal (and then eat a little more if still hungry) and by slowly increasing the dose.  The other big side effect is constipation, keep an eye on this and try Miralax or senna if needed to have a soft, easy bowel movement each day.    - If this isn't covered, then we will try a different medicine for weight loss    - I will also refer you to the weight mgmt clinic

## 2024-01-24 ENCOUNTER — TELEPHONE (OUTPATIENT)
Dept: FAMILY MEDICINE | Facility: CLINIC | Age: 29
End: 2024-01-24
Payer: COMMERCIAL

## 2024-01-24 DIAGNOSIS — E66.813 CLASS 3 SEVERE OBESITY WITH SERIOUS COMORBIDITY AND BODY MASS INDEX (BMI) OF 40.0 TO 44.9 IN ADULT, UNSPECIFIED OBESITY TYPE (H): Primary | ICD-10-CM

## 2024-01-24 DIAGNOSIS — E66.01 CLASS 3 SEVERE OBESITY WITH SERIOUS COMORBIDITY AND BODY MASS INDEX (BMI) OF 40.0 TO 44.9 IN ADULT, UNSPECIFIED OBESITY TYPE (H): Primary | ICD-10-CM

## 2024-01-24 NOTE — TELEPHONE ENCOUNTER
Plan does not cover tirzepatide-Weight Management (ZEPBOUND) 2.5 MG/0.5ML prefilled pen.  Please start a new PA.    Reason for denial: plan does not cover this med.    Insurance plan:    Patient ID: [id number]

## 2024-02-05 ENCOUNTER — MYC MEDICAL ADVICE (OUTPATIENT)
Dept: FAMILY MEDICINE | Facility: CLINIC | Age: 29
End: 2024-02-05
Payer: COMMERCIAL

## 2024-02-05 NOTE — TELEPHONE ENCOUNTER
Retail Pharmacy Prior Authorization Team   Phone: [phone]    PA Initiation    Medication: ZEPBOUND 2.5 MG/0.5ML SC SOAJ  Insurance Company: [employer] - Phone [phone] Fax [phone]  Pharmacy Filling the Rx: [id number] - [place], [place] - [address] [place] [address] & [facility]  Filling Pharmacy Phone: [phone]  Filling Pharmacy Fax:    Start Date: 2/5/2024

## 2024-02-06 NOTE — TELEPHONE ENCOUNTER
Note: Due to record-high volumes, our turn-around is time taking longer than normal . We are currently 8 days behind in the pools.   We are working diligently to submit all requests in a timely manner and in the order they are received. Please only flag true urgent requests as high priority to the pool at this time.   If you have questions - please send a note/message in the active PA encounter and send back to the RPPA (Retail Pharmacy Prior Authorization) team [[id number]].    If you have more specific questions about our process please reach out to our supervisor Nelsy Ballesteros.   Thank you!     PRIOR AUTHORIZATION DENIED    Medication: ZEPBOUND 2.5 MG/0.5ML SC SOAJ  Insurance Company: [employer] - Phone [phone] Fax [phone]  Denial Date: 2/5/2024  Denial Rational:       Appeal Information: N/A    Patient Notified: No

## 2024-02-07 NOTE — TELEPHONE ENCOUNTER
Writer responded via [facility].  Sheron VAZQUEZ RN  [facility]

## 2024-02-07 NOTE — TELEPHONE ENCOUNTER
Called pt.  1- reviewed the Wegovy rx.  2- gave the wt management referral info.  3- PT feels she may have BV. Asking for a med to be sent in . This is new since last OV.  I recommended that pt do an EVISIT with 1st available provider.  They may want pt to do a vaginal swab.  Pt agreed.  CORTEZ Mcdowell

## 2024-02-07 NOTE — TELEPHONE ENCOUNTER
Could you please let patient know and I will send Wegovy instead to the pharmacy to see if that will be covered.  It looks like she still isn't scheduled with weight mgmt clinic (referral placed last visit).    Nikki Platt, DO  Internal Medicine - Pediatrics Physician  [facility]

## 2024-02-07 NOTE — TELEPHONE ENCOUNTER
Dr. Platt: Zepbound was denied.    Nikki said that she would try to put in a different weight loss medication that might be covered by my insurance. She said that she would keep trying different ones until there was one that would be approved.     Sheron VAZQUEZ RN  [facility]

## 2024-02-27 PROBLEM — F33.1 MODERATE EPISODE OF RECURRENT MAJOR DEPRESSIVE DISORDER (H): Status: ACTIVE | Noted: 2024-02-27

## 2024-02-27 PROBLEM — E66.813 CLASS 3 SEVERE OBESITY WITH SERIOUS COMORBIDITY AND BODY MASS INDEX (BMI) OF 40.0 TO 44.9 IN ADULT, UNSPECIFIED OBESITY TYPE (H): Status: ACTIVE | Noted: 2022-01-18

## 2024-03-14 PROBLEM — R87.612 PAPANICOLAOU SMEAR OF CERVIX WITH LOW GRADE SQUAMOUS INTRAEPITHELIAL LESION (LGSIL): Status: ACTIVE | Noted: 2023-09-13

## 2024-06-25 NOTE — TELEPHONE ENCOUNTER
FYI to provider - Patient is lost to pap tracking follow-up. Attempts to contact pt have been made per reminder process and there has been no reply and/or no appt scheduled. Contact hx listed below.     Per visit notes 9/13/23:  Has required colposcopy x2 at [facility]  Last pap in 2019 was LSIL  9/13/23 LSIL, 27 yr old. Plan [facility] bef 12/13/23 09/18/23 Pt was advised.  11/21/23 Reminder MyChart  12/19/23 [facility] not done. Tracking updated for 6 mo colp/pap due 3/13/24.   3/28/24 [facility]: rescheduled  5/14/24 [facility]: cancelled  5/31/24 Reminder MyChart / Last read by MALAIKA Epstein at 12:50 PM on 5/31/2024.   6/25/24 Lost to follow-up for pap tracking

## 2024-07-15 ENCOUNTER — TELEPHONE (OUTPATIENT)
Dept: FAMILY MEDICINE | Facility: CLINIC | Age: 29
End: 2024-07-15
Payer: COMMERCIAL

## 2024-07-15 NOTE — TELEPHONE ENCOUNTER
Reason for Call:  Appointment Request    Patient requesting this type of appt: Chronic Diease Management/Medication/Follow-Up    Requested provider: Nikki Platt    Reason patient unable to be scheduled: Not within requested timeframe    When does patient want to be seen/preferred time:  asap    Comments: Patient is requesting to schedule a med check for Ozempic and Anxiety and would also like to add on STI screening and pap if possible. Willing to schedule the pap with a different provider if need be and open to a virtual for just the med check and STI screening. Strongly prefers to schedule with primary care provider. Tuesdays and Wednesdays work best for patient as she is off work.    Could we send this information to you in [employer] or would you prefer to receive a phone call?:   Patient would prefer a phone call   Okay to leave a detailed message?: Yes at Cell number on file:    Telephone Information:   Mobile [phone]       Call taken on 7/15/2024 at 2:16 PM by Laxmi Herrmann

## 2024-07-15 NOTE — TELEPHONE ENCOUNTER
Could you please schedule her in a lunch spot with me on a day when I'm not otherwise doublebooked?  Virtual or in person is ok with me- if in person can do Pap.  If still can't get in before needs Ozempic, could submit and e-visit for Ozempic and anxiety question then we can MyChart back and forth.    Nikki Platt, DO  Internal Medicine - Pediatrics Physician  [facility]

## 2024-07-15 NOTE — TELEPHONE ENCOUNTER
Please advise. Next available for you, squeeze them in, or have them scheduled with next available with another provider?

## 2024-07-16 NOTE — TELEPHONE ENCOUNTER
LVM for patient to call back and ask for care team. Okay to use lunch spot but not doublebook per pcp

## 2024-07-23 ENCOUNTER — OFFICE VISIT (OUTPATIENT)
Dept: FAMILY MEDICINE | Facility: CLINIC | Age: 29
End: 2024-07-23
Payer: COMMERCIAL

## 2024-07-23 ENCOUNTER — MYC MEDICAL ADVICE (OUTPATIENT)
Dept: FAMILY MEDICINE | Facility: CLINIC | Age: 29
End: 2024-07-23

## 2024-07-23 VITALS
HEART RATE: 65 BPM | HEIGHT: 65 IN | OXYGEN SATURATION: 98 % | WEIGHT: 266 LBS | BODY MASS INDEX: 44.32 KG/M2 | SYSTOLIC BLOOD PRESSURE: 108 MMHG | TEMPERATURE: 97.3 F | DIASTOLIC BLOOD PRESSURE: 74 MMHG | RESPIRATION RATE: 15 BRPM

## 2024-07-23 DIAGNOSIS — Z11.3 ROUTINE SCREENING FOR STI (SEXUALLY TRANSMITTED INFECTION): ICD-10-CM

## 2024-07-23 DIAGNOSIS — N89.8 VAGINAL IRRITATION: ICD-10-CM

## 2024-07-23 DIAGNOSIS — N76.0 BV (BACTERIAL VAGINOSIS): Primary | ICD-10-CM

## 2024-07-23 DIAGNOSIS — E66.813 CLASS 3 SEVERE OBESITY WITH SERIOUS COMORBIDITY AND BODY MASS INDEX (BMI) OF 40.0 TO 44.9 IN ADULT, UNSPECIFIED OBESITY TYPE (H): Primary | ICD-10-CM

## 2024-07-23 DIAGNOSIS — E66.01 CLASS 3 SEVERE OBESITY WITH SERIOUS COMORBIDITY AND BODY MASS INDEX (BMI) OF 40.0 TO 44.9 IN ADULT, UNSPECIFIED OBESITY TYPE (H): Primary | ICD-10-CM

## 2024-07-23 DIAGNOSIS — B96.89 BV (BACTERIAL VAGINOSIS): Primary | ICD-10-CM

## 2024-07-23 DIAGNOSIS — F41.9 ANXIETY: ICD-10-CM

## 2024-07-23 DIAGNOSIS — M54.16 RIGHT LUMBAR RADICULOPATHY: ICD-10-CM

## 2024-07-23 LAB
ALBUMIN SERPL BCG-MCNC: 4.5 G/DL (ref 3.5–5.2)
ALP SERPL-CCNC: 104 U/L (ref 40–150)
ALT SERPL W P-5'-P-CCNC: 89 U/L (ref 0–50)
ANION GAP SERPL CALCULATED.3IONS-SCNC: 9 MMOL/L (ref 7–15)
AST SERPL W P-5'-P-CCNC: 58 U/L (ref 0–45)
BILIRUB SERPL-MCNC: 0.5 MG/DL
BUN SERPL-MCNC: 10.6 MG/DL (ref 6–20)
CALCIUM SERPL-MCNC: 9.4 MG/DL (ref 8.8–10.4)
CHLORIDE SERPL-SCNC: 103 MMOL/L (ref 98–107)
CLUE CELLS: PRESENT
CREAT SERPL-MCNC: 0.73 MG/DL (ref 0.51–0.95)
EGFRCR SERPLBLD CKD-EPI 2021: >90 ML/MIN/1.73M2
GLUCOSE SERPL-MCNC: 103 MG/DL (ref 70–99)
HCO3 SERPL-SCNC: 25 MMOL/L (ref 22–29)
HIV 1+2 AB+HIV1 P24 AG SERPL QL IA: NONREACTIVE
POTASSIUM SERPL-SCNC: 4.7 MMOL/L (ref 3.4–5.3)
PROT SERPL-MCNC: 7.7 G/DL (ref 6.4–8.3)
SODIUM SERPL-SCNC: 137 MMOL/L (ref 135–145)
TRICHOMONAS, WET PREP: ABNORMAL
WBC'S/HIGH POWER FIELD, WET PREP: ABNORMAL
YEAST, WET PREP: ABNORMAL

## 2024-07-23 PROCEDURE — 86780 TREPONEMA PALLIDUM: CPT | Performed by: INTERNAL MEDICINE

## 2024-07-23 PROCEDURE — 87491 CHLMYD TRACH DNA AMP PROBE: CPT | Performed by: INTERNAL MEDICINE

## 2024-07-23 PROCEDURE — 96127 BRIEF EMOTIONAL/BEHAV ASSMT: CPT | Performed by: INTERNAL MEDICINE

## 2024-07-23 PROCEDURE — 87210 SMEAR WET MOUNT SALINE/INK: CPT | Performed by: INTERNAL MEDICINE

## 2024-07-23 PROCEDURE — 99215 OFFICE O/P EST HI 40 MIN: CPT | Performed by: INTERNAL MEDICINE

## 2024-07-23 PROCEDURE — G2211 COMPLEX E/M VISIT ADD ON: HCPCS | Performed by: INTERNAL MEDICINE

## 2024-07-23 PROCEDURE — 87389 HIV-1 AG W/HIV-1&-2 AB AG IA: CPT | Performed by: INTERNAL MEDICINE

## 2024-07-23 PROCEDURE — 80053 COMPREHEN METABOLIC PANEL: CPT | Performed by: INTERNAL MEDICINE

## 2024-07-23 PROCEDURE — 36415 COLL VENOUS BLD VENIPUNCTURE: CPT | Performed by: INTERNAL MEDICINE

## 2024-07-23 PROCEDURE — 87591 N.GONORRHOEAE DNA AMP PROB: CPT | Performed by: INTERNAL MEDICINE

## 2024-07-23 RX ORDER — METRONIDAZOLE 500 MG/1
500 TABLET ORAL 2 TIMES DAILY
Qty: 14 TABLET | Refills: 0 | Status: SHIPPED | OUTPATIENT
Start: 2024-07-23 | End: 2024-07-30

## 2024-07-23 RX ORDER — SERTRALINE HYDROCHLORIDE 25 MG/1
25 TABLET, FILM COATED ORAL DAILY
Qty: 90 TABLET | Refills: 1 | Status: SHIPPED | OUTPATIENT
Start: 2024-07-23

## 2024-07-23 ASSESSMENT — ENCOUNTER SYMPTOMS
NERVOUS/ANXIOUS: 1
BACK PAIN: 1

## 2024-07-23 ASSESSMENT — PATIENT HEALTH QUESTIONNAIRE - PHQ9
SUM OF ALL RESPONSES TO PHQ QUESTIONS 1-9: 14
10. IF YOU CHECKED OFF ANY PROBLEMS, HOW DIFFICULT HAVE THESE PROBLEMS MADE IT FOR YOU TO DO YOUR WORK, TAKE CARE OF THINGS AT HOME, OR GET ALONG WITH OTHER PEOPLE: VERY DIFFICULT
SUM OF ALL RESPONSES TO PHQ QUESTIONS 1-9: 14

## 2024-07-23 NOTE — PATIENT INSTRUCTIONS
- Start Zepbound- biggest side effects are stomach fullness, discomfort, and nausea- we will reduce the risk of these side effects by cutting portions in half at each meal (and then eat a little more if still hungry) and by slowly increasing the dose.  The other big side effect is constipation, keep an eye on this and try Miralax or senna if needed to have a soft, easy bowel movement each day.    - I will send new prescriptions for Zepbound to the pharmacy ([facility] The Zepbound website also has a coupon to look at to reduce cost.    - However, because of issues of availability and cost, I will also refer you to Arnav Teran who is our clinic pharmacist and can help get compounded semaglutide which can be more affordable and easier to get.    - I will send you a message in a few days to see if you could get the Zepbound, if not, then I will send the Wegovy instead    - For anxiety, I recommend starting sertraline (Zoloft) 25 mg daily.  Watch for GI and sexual side effects.    - I will send you a MyC message in 2 weeks to check in    - I also want to see you again in 4-6 weeks for follow up on anxiety

## 2024-07-23 NOTE — PROGRESS NOTES
"  Assessment & Plan     (E66.01,  Z68.41) Class 3 severe obesity with serious comorbidity and body mass index (BMI) of 40.0 to 44.9 in adult, unspecified obesity type (H)  (primary encounter diagnosis)  Comment: Start tirzepatide- if not covered or difficulty obtaining, recommend seeing [facility] pharmacist for compounding options.  Plan: Med Therapy Management Referral,         tirzepatide-Weight Management (ZEPBOUND) 7.5         MG/0.5ML prefilled pen, tirzepatide-Weight         Management (ZEPBOUND) 5 MG/0.5ML prefilled pen,        tirzepatide-Weight Management (ZEPBOUND) 2.5         MG/0.5ML prefilled pen, Comprehensive metabolic        panel (BMP + Alb, Alk Phos, ALT, AST, Total.         Bili, TP)    (F41.9) Anxiety  Comment: Worsening over time- staying active, but symptoms not improving- trial sertraline 25 mg QD.  MyC check in 2w, see me 4-6w.  Plan: Adult Mental Health  Referral- therapy referral,         sertraline (ZOLOFT) 25 MG tablet    (M54.16) Right lumbar radiculopathy  Comment: Persisting symptoms, now sciatic nerve pain down right leg.  No weakness.  PT referral placed.  Plan: Physical Therapy  Referral    (Z11.3) Routine screening for STI (sexually transmitted infection)  Comment:   Plan: Treponema Abs w Reflex to RPR and Titer, HIV         Antigen Antibody Combo Cascade, Chlamydia         trachomatis PCR, Neisseria gonorrhoeae PCR        I spent a total of 44 minutes on the day of the visit.   Time spent by me doing chart review, history and exam, documentation and further activities per the note      BMI  Estimated body mass index is 44.26 kg/m  as calculated from the following:    Height as of this encounter: 1.651 m (5' 5\").    Weight as of this encounter: 120.7 kg (266 lb).         Patient Instructions   - Start Zepbound- biggest side effects are stomach fullness, discomfort, and nausea- we will reduce the risk of these side effects by cutting portions in half at each meal (and " then eat a little more if still hungry) and by slowly increasing the dose.  The other big side effect is constipation, keep an eye on this and try Miralax or senna if needed to have a soft, easy bowel movement each day.    - I will send new prescriptions for Zepbound to the pharmacy ([facility]). The Zepbound website also has a coupon to look at to reduce cost.    - However, because of issues of availability and cost, I will also refer you to Arnav Teran who is our clinic pharmacist and can help get compounded semaglutide which can be more affordable and easier to get.    - I will send you a message in a few days to see if you could get the Zepbound, if not, then I will send the Wegovy instead    - For anxiety, I recommend starting sertraline (Zoloft) 25 mg daily.  Watch for GI and sexual side effects.    - I will send you a MyC message in 2 weeks to check in    - I also want to see you again in 4-6 weeks for follow up on anxiety    Subjective   MALAIKA is a 28 year old, presenting for the following health issues:  Med Check  (Med Check ), Anxiety (Anxiety med check ), and Back Pain (Back Pain )        7/23/2024    11:54 AM   Additional Questions   Roomed by Soraida Joy         7/23/2024   Forms   Any forms needing to be completed Yes        History of Present Illness       Reason for visit:  Weight loss medication, depression med, daily anxiety meds, severe pain on right side of lower back going down glute area, pap or set up cholposcopy.    She eats 0-1 servings of fruits and vegetables daily.She consumes 1 sweetened beverage(s) daily.She exercises with enough effort to increase her heart rate 20 to 29 minutes per day.  She exercises with enough effort to increase her heart rate 3 or less days per week.   She is taking medications regularly.     Plan to defer Pap until colposcopy.    Last 1/23/24.  Would be interested in seeing about compounded Wegovy/tirzepatide.    Has had anxiety since she was a kid- has been  to the hospital two times when she was a kid- couldn't breathe and had panic attacks.  Has learned to calm herself down and coping mechanisms- but seems to be getting worse.  Wondering about daily anxiety medicaiton.  Goes on walks every day, gets outside most every day, tries to fr[place]ka.  Only takes hydroxyzine when really, really needs it because it makes her tired.  Now noticing every day anxiety.    Has seen therapists in the past- last one didn't work out.  Has reached out to seven therapists but only got 1 response.     Meets NABEEL DSM criteria:   A) Excessive anxiety and worry (apprehensive expectation), occurring more days than not for at least six months, about a number of events or activities (such as work or school performance).    B) The individual finds it difficult to control the worry.    C) The anxiety and worry are associated with three (or more) of the following six symptoms (with at least some symptoms having been present for more days than not for the past six months):    Note: Only one item is required in children.    - Restlessness or feeling keyed up or on edge  - Being easily fatigued- no matter what or how much sleep  - Difficulty concentrating or mind going blank  - Irritability  - Muscle tension- still having back pain- despite doing back exercises- now  having pain down back of right leg for 6 weeks.  A little numbness in right foot.  Doing physical therapy exercises when gets home from work.  - Sleep disturbance (difficulty falling or staying asleep, or restless, unsatisfying sleep)- not so much    D) The anxiety, worry, or physical symptoms cause clinically significant distress or impairment in social, occupational, or other important areas of functioning.    E) The disturbance is not attributable to the physiological effects of a substance (eg, a drug of abuse, a medication) or another medical condition (eg, hyperthyroidism).    F) The disturbance is not better explained by another mental  "disorder (eg, anxiety or worry about having panic attacks in panic disorder, negative evaluation in social anxiety disorder [social phobia], contamination or other obsessions in OCD, separation from attachment figures in separation anxiety disorder, reminders of traumatic events in posttraumatic stress disorder, gaining weight in anorexia nervosa, physical complaints in somatic symptom disorder, perceived appearance flaws in body dysmorphic disorder, having a serious illness in illness anxiety disorder, or the content of delusional beliefs in schizophrenia or delusional disorder). Because the majority of the anxiety symptoms are not specific to NABEEL, it is important to exclude the other anxiety disorders before making the diagnosis.        Objective    /74 (BP Location: Right arm, Patient Position: Sitting, Cuff Size: Adult Large)   Pulse 65   Temp 97.3  F (36.3  C) (Temporal)   Resp 15   Ht 1.651 m (5' 5\")   Wt 120.7 kg (266 lb)   LMP 07/14/2024   SpO2 98%   BMI 44.26 kg/m    Body mass index is 44.26 kg/m .  Physical Exam   GENERAL: alert and no distress  CV: regular rate and rhythm, normal S1 S2, no S3 or S4, no murmur, click or rub, no peripheral edema  MS: no gross musculoskeletal defects noted, patellar DTR 1+ bilaterally  PSYCH: mentation appears normal, affect normal/bright            Signed Electronically by: Nikki Platt DO    "

## 2024-07-24 LAB
C TRACH DNA SPEC QL NAA+PROBE: NEGATIVE
N GONORRHOEA DNA SPEC QL NAA+PROBE: NEGATIVE
T PALLIDUM AB SER QL: NONREACTIVE

## 2024-07-25 ENCOUNTER — TELEPHONE (OUTPATIENT)
Dept: FAMILY MEDICINE | Facility: CLINIC | Age: 29
End: 2024-07-25

## 2024-07-25 NOTE — TELEPHONE ENCOUNTER
Plan does not cover medication.     Per Rx Benefit plan alternative medication is needed.     Please fax or send new medication with approval along with strength, direction, quantity and refills.

## 2024-07-26 NOTE — TELEPHONE ENCOUNTER
Hold for PCP      Comment: Start tirzepatide- if not covered or difficulty obtaining, recommend seeing [facility] pharmacist for compounding options.

## 2024-07-29 ENCOUNTER — TELEPHONE (OUTPATIENT)
Dept: FAMILY MEDICINE | Facility: CLINIC | Age: 29
End: 2024-07-29
Payer: COMMERCIAL

## 2024-07-29 DIAGNOSIS — E66.813 CLASS 3 SEVERE OBESITY WITH SERIOUS COMORBIDITY AND BODY MASS INDEX (BMI) OF 40.0 TO 44.9 IN ADULT, UNSPECIFIED OBESITY TYPE (H): Primary | ICD-10-CM

## 2024-07-29 DIAGNOSIS — K75.81 NASH (NONALCOHOLIC STEATOHEPATITIS): ICD-10-CM

## 2024-07-29 DIAGNOSIS — E66.01 CLASS 3 SEVERE OBESITY WITH SERIOUS COMORBIDITY AND BODY MASS INDEX (BMI) OF 40.0 TO 44.9 IN ADULT, UNSPECIFIED OBESITY TYPE (H): Primary | ICD-10-CM

## 2024-07-29 NOTE — TELEPHONE ENCOUNTER
MTM referral from: [place] clinic visit (referral by provider)    MTM referral outreach attempt #2 on July 29, 2024 at 3:53 PM      Outcome: Left Message    Use [employer] for the carrier/Plan on the flowsheet      Milat Message Sent    Vandana Hancock CMA  [facility]

## 2024-07-29 NOTE — TELEPHONE ENCOUNTER
Prior Authorization Retail Medication Request    Medication/Dose: Semaglutide-Weight Management (WEGOVY) 1 MG/0.5ML pen   Diagnosis and ICD code (if different than what is on RX):    Class 3 severe obesity with serious comorbidity and body mass index (BMI) of 40.0 to 44.9 in adult, unspecified obesity type (H) [E66.01, Z68.41]  - Primary        Insurance   Primary:  [facility]   Insurance ID:  [id number]     Pharmacy Information (if different than what is on RX)  Name:  [facility]  Phone:  [phone]  Fax: [phone]

## 2024-07-29 NOTE — TELEPHONE ENCOUNTER
Sent Wegovy instead in MyC encounter.    Nikki Platt, DO  Internal Medicine - Pediatrics Physician  [facility]

## 2024-07-30 NOTE — TELEPHONE ENCOUNTER
Team- can you please ask her to call [facility] pharmacist [phone] or toll-free at [phone] scheduling because I think that is going to be the best way to get this ongoing coverage- Arnav can also ma with insurance company to try to get this covered or can offer compounded options.    Thank you!     Nikki Platt, DO  Internal Medicine - Pediatrics Physician  [facility]

## 2024-07-31 ENCOUNTER — TELEPHONE (OUTPATIENT)
Dept: FAMILY MEDICINE | Facility: CLINIC | Age: 29
End: 2024-07-31
Payer: COMMERCIAL

## 2024-07-31 ENCOUNTER — TELEPHONE (OUTPATIENT)
Dept: FAMILY MEDICINE | Facility: CLINIC | Age: 29
End: 2024-07-31

## 2024-07-31 NOTE — TELEPHONE ENCOUNTER
Prior Authorization Retail Medication Request    Medication/Dose: Semaglutide-Weight Management (WEGOVY) 0.5 MG/0.5ML pen   Diagnosis and ICD code (if different than what is on RX):     Class 3 severe obesity with serious comorbidity and body mass index (BMI) of 40.0 to 44.9 in adult, unspecified obesity type (H) [E66.01, Z68.41]  - Primary       Insurance   Primary: [facility]   Insurance ID:  [id number]     Pharmacy Information (if different than what is on RX)  [facility]  [facility]   Phone:  [phone]  Fax: [phone]

## 2024-07-31 NOTE — TELEPHONE ENCOUNTER
Prior Authorization Retail Medication Request    Medication/Dose:     Semaglutide-Weight Management (WEGOVY) 1 MG/0.5ML pen     Diagnosis and ICD code (if different than what is on RX):     Class 3 severe obesity with serious comorbidity and body mass index (BMI) of 40.0 to 44.9 in adult, unspecified obesity type (H) [E66.01, Z68.41]  - Primary       Insurance   Primary: [facility]   Insurance ID:  [id number]     Pharmacy Information (if different than what is on RX)  [facility]  [facility]   Phone:  [phone]  Fax: [phone]

## 2024-07-31 NOTE — TELEPHONE ENCOUNTER
PRIOR AUTHORIZATION DENIED    Medication: WEGOVY 0.25 MG/0.5ML SC SOAJ  Insurance Company: [employer] - Phone [phone] Fax [phone]  Denial Date: 7/31/2024  Denial Reason(s): Weight Loss drugs are Excluded      Appeal Information: N/A  Patient Notified: No

## 2024-07-31 NOTE — TELEPHONE ENCOUNTER
Please advise. PA was denied because insurance excludes weight loss medications and we are not able to appeal.

## 2024-07-31 NOTE — TELEPHONE ENCOUNTER
PA Initiation    Medication: WEGOVY 0.25 MG/0.5ML SC SOAJ  Insurance Company: [employer] - Phone [phone] Fax [phone]  [facility] the [id number] - [place], [place] - [address] AT [facility]  Filling Pharmacy Phone: [phone]  Filling Pharmacy Fax:    Start Date: 7/31/2024

## 2024-08-01 NOTE — TELEPHONE ENCOUNTER
PA for this medication was submitted and denied in encounter dated 07/29/2024. Per insurance, medication is excluded from patient's benefit plan and will not be covered. Review and appeal are not available because of this exclusion.

## 2024-08-14 ENCOUNTER — PATIENT OUTREACH (OUTPATIENT)
Dept: CARE COORDINATION | Facility: CLINIC | Age: 29
End: 2024-08-14
Payer: COMMERCIAL

## 2024-08-20 ENCOUNTER — OFFICE VISIT (OUTPATIENT)
Dept: OBGYN | Facility: CLINIC | Age: 29
End: 2024-08-20
Payer: COMMERCIAL

## 2024-08-20 VITALS
WEIGHT: 267 LBS | DIASTOLIC BLOOD PRESSURE: 67 MMHG | SYSTOLIC BLOOD PRESSURE: 110 MMHG | HEART RATE: 69 BPM | HEIGHT: 65 IN | BODY MASS INDEX: 44.48 KG/M2 | OXYGEN SATURATION: 97 %

## 2024-08-20 DIAGNOSIS — R87.612 LGSIL ON PAP SMEAR OF CERVIX: Primary | ICD-10-CM

## 2024-08-20 LAB — HCG UR QL: NEGATIVE

## 2024-08-20 PROCEDURE — 87624 HPV HI-RISK TYP POOLED RSLT: CPT | Performed by: OBSTETRICS & GYNECOLOGY

## 2024-08-20 PROCEDURE — G0145 SCR C/V CYTO,THINLAYER,RESCR: HCPCS | Performed by: OBSTETRICS & GYNECOLOGY

## 2024-08-20 PROCEDURE — 57454 BX/CURETT OF CERVIX W/SCOPE: CPT | Performed by: OBSTETRICS & GYNECOLOGY

## 2024-08-20 PROCEDURE — G0124 SCREEN C/V THIN LAYER BY MD: HCPCS | Performed by: PATHOLOGY

## 2024-08-20 PROCEDURE — 81025 URINE PREGNANCY TEST: CPT | Performed by: OBSTETRICS & GYNECOLOGY

## 2024-08-20 PROCEDURE — 88305 TISSUE EXAM BY PATHOLOGIST: CPT | Performed by: PATHOLOGY

## 2024-08-20 NOTE — PROGRESS NOTES
"[facility]- OBGYN    CC:colposcopy    S:Angelia Epstein is a 28 year old  who presents today for colposcopy due to LSIL pap 2023.  Patient reports she thinks she got the HPV vaccine.  This was confirmed in her chart.    Pap Hx:  Reports history of colpo x2    Patient's last menstrual period was 2024.  UPT today is negative  Patient does not smoke  Type of contraception: condoms  Age at first sexual intercourse: 19  Number of sexual partners (lifetime): 6+  Past GYN history: HPV  Prior cervical/vaginal disease: none.  Prior cervical treatment: no treatment.    OBGYN Hx:     Menses:irregular  Sexually active with one male partner.  Uses condoms 50% of the time    PMH:   Past Medical History:   Diagnosis Date    NO ACTIVE PROBLEMS        PSH:  Past Surgical History:   Procedure Laterality Date    CYSTECTOMY PILONIDAL  3/7/2012    Procedure:CYSTECTOMY PILONIDAL; I & D of Pilonidal cyst; Surgeon:ROSARIO GOODE; Location: OR    HEAD & NECK SURGERY      tonsilectomy       Meds: atarax prn  Allergies:cat      O: Patient Vitals for the past 24 hrs:   BP Pulse SpO2 Height Weight   24 1104 110/67 69 97 % 1.651 m (5' 5\") 121.1 kg (267 lb)   ]    PROCEDURE:  Before the procedure, it was ensured that the patient was educated regarding the nature of her findings to date, the implications, and what was to be done. She has been made aware of the role of HPV, the natural history of infection, ways to minimize her future risk, the effect of HPV on the cervix, and treatment options available should they be indicated.  The details of the colposcopic procedure were reviewed. Indications, alternatives, benefits, and risks including bleeding, infection, pain, and injury to surrounding organs were reviewed.  Questions and concerns were addressed.  Consent was signed.     Medium kristine speculum placed in vagina without visualization of cervix.  Large kristine speculum placed in vagina and excellent " visualization of cervix achieved, cervix swabbed x 3 with acetic acid solution.  Cervix noted to have patch of acetowhite change at 4 oclock and 8 oclock location.  Cervical biopsy taken at 4 and 8 o'clock.  Endocervical currettage collected.  Cervix made hemostatic with silver nitrate sticks.  Speculum removed. Patient tolerated procedure well.    FINDINGS:  Cervix:  Cervix noted to have patch of acetowhite change at 4 oclock and 8 oclock location.     Pap repeated?:  Yes   SCJ seen?:  yes    ECC done?:  Yes   Lugol's solution used?:  No  Satisfactory examination?:  yes      ASSESSMENT: HPV related changes and CLARY 1.    PLAN: specimens labelled and sent to Pathology, will base further treatment on Pathology findings, treatment options discussed with patient, and post biopsy instructions given to patient    Niurka Yan MD

## 2024-08-22 LAB
HPV HR 12 DNA CVX QL NAA+PROBE: POSITIVE
HPV16 DNA CVX QL NAA+PROBE: NEGATIVE
HPV18 DNA CVX QL NAA+PROBE: NEGATIVE
HUMAN PAPILLOMA VIRUS FINAL DIAGNOSIS: ABNORMAL
PATH REPORT.COMMENTS IMP SPEC: NORMAL
PATH REPORT.COMMENTS IMP SPEC: NORMAL
PATH REPORT.FINAL DX SPEC: NORMAL
PATH REPORT.GROSS SPEC: NORMAL
PATH REPORT.MICROSCOPIC SPEC OTHER STN: NORMAL
PATH REPORT.RELEVANT HX SPEC: NORMAL
PHOTO IMAGE: NORMAL

## 2024-08-26 ENCOUNTER — TELEPHONE (OUTPATIENT)
Dept: FAMILY MEDICINE | Facility: CLINIC | Age: 29
End: 2024-08-26
Payer: COMMERCIAL

## 2024-08-26 ENCOUNTER — PATIENT OUTREACH (OUTPATIENT)
Dept: OBGYN | Facility: CLINIC | Age: 29
End: 2024-08-26
Payer: COMMERCIAL

## 2024-08-26 LAB
BKR AP ASSOCIATED HPV REPORT: ABNORMAL
BKR LAB AP GYN ADEQUACY: ABNORMAL
BKR LAB AP GYN INTERPRETATION: ABNORMAL
BKR LAB AP PREVIOUS ABNL DX: ABNORMAL
BKR LAB AP PREVIOUS ABNORMAL: ABNORMAL
PATH REPORT.COMMENTS IMP SPEC: ABNORMAL
PATH REPORT.COMMENTS IMP SPEC: ABNORMAL
PATH REPORT.RELEVANT HX SPEC: ABNORMAL

## 2024-08-26 NOTE — TELEPHONE ENCOUNTER
8/20/24 [place]: CLARY 2. Co-test: HSIL, +HR HPV, not 16/18. Plan to discuss options for LEEP or close follow up.  8/29/24 Video visit to discuss

## 2024-08-28 ENCOUNTER — PATIENT OUTREACH (OUTPATIENT)
Dept: CARE COORDINATION | Facility: CLINIC | Age: 29
End: 2024-08-28

## 2024-08-29 ENCOUNTER — VIRTUAL VISIT (OUTPATIENT)
Dept: OBGYN | Facility: CLINIC | Age: 29
End: 2024-08-29
Payer: COMMERCIAL

## 2024-08-29 DIAGNOSIS — N87.1 DYSPLASIA OF CERVIX, HIGH GRADE CIN 2: Primary | ICD-10-CM

## 2024-08-29 PROCEDURE — 99442 PR PHYSICIAN TELEPHONE EVALUATION 11-20 MIN: CPT | Mod: 93 | Performed by: OBSTETRICS & GYNECOLOGY

## 2024-08-29 NOTE — PROGRESS NOTES
Telemedicine Visit: The patient's condition can be safely assessed and treated via phone encounter    Reason for Telemedicine Visit: Patient has requested telehealth visit    Originating Site (Patient Location): Patient's home  Distant Location (provider location):  On-site    Consent:  The patient/guardian has verbally consented to: the potential risks and benefits of telemedicine (video visit) versus in person care; bill my insurance or make self-payment for services provided; and responsibility for payment of non-covered services.     Mode of Communication:  phone call    As the provider I attest to compliance with applicable laws and regulations related to telemedicine.     Call start time: 1459  Call end time: 1513    S: Patient reports she is feeling well.  She states she has no plans for pregnancy in the near future (none in the next year), but does at some point plan future pregnancies.      O:  24  Final Diagnosis   A.  Cervix, 4:00, biopsy:  -Squamocolumnar junction with focal involvement by high-grade squamous intraepithelial lesion (cervical intraepithelial neoplasia CLARY-2) arising in a background of low-grade squamous intraepithelial lesion (cervical intraepithelial neoplasia CLARY-1).     B.  Cervix, 8:00, biopsy:  -Squamocolumnar junction with focal involvement by high-grade squamous intraepithelial lesion (cervical intraepithelial neoplasia CLARY-2) arising in a background of low-grade squamous intraepithelial lesion (cervical intraepithelial neoplasia CLARY-1).     C.  Endocervix, curettage:  -Superficial fragments of squamous epithelium involved by at least low-grade dysplasia.  -Benign endocervical epithelium.     A&P: Angelia Epstein is a 28 year old  who presents today to discussed CIN2 on cervical biopsy.    (N87.1) Dysplasia of cervix, high grade CLARY 2  (primary encounter diagnosis)  Comment: Reviewed biopsy results with patient.  Discussed her future pregnancy plans.  She does not plan  pregnancy in the near future.  We discussed that procedure plan would be a LEEP and ECC.  There was low grade dysplasia on ECC, thus we will not perform top hat during procedure.  Explained small increase risk for future pregnancy of cervical insufficiency and miscarriage.  Explained that preferred treatment would be to move forward with LEEP.  Discussed procedure indications, alternatives (discussed repeat colpo with bx in 6 months), benefits, and risks as well as expectations.  Pre-treat with tylenol and ibuprofen.  Patient plans to bring support person.    Plan: plan for LEEP    Niurka Yan MD

## 2024-08-29 NOTE — TELEPHONE ENCOUNTER
Has upcoming appointment with [facility] pharmacist to help!    Nikki Platt, DO  Internal Medicine - Pediatrics Physician  [facility]

## 2024-09-02 NOTE — PROGRESS NOTES
Medication Therapy Management (MTM) Encounter    ASSESSMENT:                            Medication Adherence/Access: No issues identified    Class III Obesity (BMI 40 or more):/NAFLD:   Patient would benefit from  : Zepbound --PA required --mtm will submit today  and lifestyle modifications.       PLAN:                            1. So sorry you have not been able to obtain injectable weight loss drug to help reverse body weight as well as NAFLD--I think the issue is wegovy is not on your insurance formulary so I will try for a prior authorization for weekly injectable drug called Zepbound 2.5mg./week injection. I will submit that today --they should notify you via text to your phone in next few days --message me back via [employer] when you hear from them . If they deny that drug ill do PA for Saxenda for you.    A few points to remember if approved I will send rx to our clinic pharmacy here at [place]; also stop eating as soon as you start to feel full to avoid nausea or potentially vomiting. If constipation occurs while on drug -I can send rx in for you for daily glass of miralax.  If nausea is an issue I can send in rx for anti-nausea drug as well.     Try to eat proteins first when you eat , then just enough carbs/day to feel mentally happy.     Stay active walking daily , try not to snack or eat after 7 pm at night .      Follow-up: Return in about 4 weeks (around 10/2/2024), or @ 1;30 Pm via telephone, for Medication Therapy Management Visit, Weight loss.    SUBJECTIVE/OBJECTIVE:                          MALAIKA Epstein is a 28 year old female seen for an initial visit. She was referred to me from Nikki Platt  .      Reason for visit:   Weight Loss/Obesity - Monty Fox    8/28/24  9:33 AM  Note  PA for this medication was submitted and denied in encounter dated 07/29/2024. Per insurance, medication is excluded from patient's benefit plan and will not be  "covered. Review and appeal are not available because of this exclusion.           Allergies/ADRs: reviewed in chart today .  Past Medical History: Reviewed in chart and Per patient NAFLD .  Tobacco: She reports that she has never smoked. She has never used smokeless tobacco.  Alcohol: 2 nights /week and has 2 drinks those nights.  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  Personal Healthcare Goals: wt.loss goal = lose 100lbs. --167 nikos.  Activity: walks a bit , yoga once/week now.     Medication Adherence/Access: no issues reported    Weight Management /NAFLD:  Never tried --couldn't get any of them. See LEATHA freedman above.  WW years ago keto worked not sustainable,     Nutrition/Eating Habits:   Bfast- skips due to working late, awakes at 11am -eats 12;30-1pm- 2 eggs, toast ,meat like turkey or sausage, then eats again at 7pm -pasta at work or FF , then again eats at bedtime- sandwich or mac and cheese.    Not much snacking -except late at night yes-chips or cookies.    Exercise/Activity: see above activity.  Medications Tried/Failed:  None.     Initial Consult Weight: 267lbs.         Wt Readings from Last 4 Encounters:   08/20/24 267 lb (121.1 kg)   07/23/24 266 lb (120.7 kg)   01/23/24 260 lb 9.6 oz (118.2 kg)   09/13/23 261 lb (118.4 kg)     Estimated body mass index is 44.43 kg/m  as calculated from the following:    Height as of 8/20/24: 5' 5\" (1.651 m).    Weight as of 8/20/24: 267 lb (121.1 kg).          ---------------------------------------------------------------------------------------------------------------------    I spent 40 minutes with this patient today. All changes were made via collaborative practice agreement with Nikki Platt DO. A copy of the visit note was provided to the patient's provider(s).    A summary of these recommendations was sent via [employer].    Arnav Teran, " [facility], located within [facility].  Medication Therapy Management Provider  [phone]      Telemedicine Visit Details  Type of service:  Telephone visit  Start Time: 1:30 PM  End Time: 2:10 PM     Medication Therapy Recommendations  Class 3 severe obesity with serious comorbidity and body mass index (BMI) of 40.0 to 44.9 in adult, unspecified obesity type (H)    Rationale: Untreated condition - Needs additional medication therapy - Indication   Recommendation: Start Medication - Zepbound 2.5 MG/0.5ML Soaj - mtm submitting PA today .   Status: Accepted per CPA

## 2024-09-04 ENCOUNTER — VIRTUAL VISIT (OUTPATIENT)
Dept: PHARMACY | Facility: CLINIC | Age: 29
End: 2024-09-04
Payer: COMMERCIAL

## 2024-09-04 DIAGNOSIS — E66.813 CLASS 3 SEVERE OBESITY WITH SERIOUS COMORBIDITY AND BODY MASS INDEX (BMI) OF 40.0 TO 44.9 IN ADULT, UNSPECIFIED OBESITY TYPE (H): Primary | ICD-10-CM

## 2024-09-04 DIAGNOSIS — E66.01 CLASS 3 SEVERE OBESITY WITH SERIOUS COMORBIDITY AND BODY MASS INDEX (BMI) OF 40.0 TO 44.9 IN ADULT, UNSPECIFIED OBESITY TYPE (H): Primary | ICD-10-CM

## 2024-09-04 PROCEDURE — 99605 MTMS BY PHARM NP 15 MIN: CPT | Mod: 93 | Performed by: PHARMACIST

## 2024-09-04 NOTE — Clinical Note
Laure --thx. For mtm referral--wegovy no longer on her drug formulary ill try for pa for zepbound in denied ill try saxenda.  Stay tuned --te Cabral

## 2024-09-04 NOTE — PATIENT INSTRUCTIONS
"Recommendations from today's MTM visit:                                                    MTM (medication therapy management) is a service provided by a clinical pharmacist designed to help you get the most of out of your medicines.   Today we reviewed what your medicines are for, how to know if they are working, that your medicines are safe and how to make your medicine regimen as easy as possible.      1. So sorry you have not been able to obtain injectable weight loss drug to help reverse body weight as well as NAFLD--I think the issue is wegovy is not on your insurance formulary so I will try for a prior authorization for weekly injectable drug called Zepbound 2.5mg./week injection. I will submit that today --they should notify you via text to your phone in next few days --message me back via [employer] when you hear from them .    A few points to remember if approved I will send rx to our clinic pharmacy here at [place]; also stop eating as soon as you start to feel full to avoid nausea or potentially vomiting. If constipation occurs while on drug -I can send rx in for you for daily glass of miralax.  If nausea is an issue I can send in rx for anti-nausea drug as well.     Try to eat proteins first when you eat , then just enough carbs/day to feel mentally happy.     Stay active walking daily , try not to snack or eat after 7 pm at night .      Follow-up: Return in about 4 weeks (around 10/2/2024), or @ 1;30 Pm via telephone, for Medication Therapy Management Visit, Weight loss.    It was great speaking with you today.  I value your experience and would be very thankful for your time in providing feedback in our clinic survey. In the next few days, you may receive an email or text message from [employer] with a link to a survey related to your  clinical pharmacist.\"     To schedule another MTM appointment, please call the clinic directly or you may call the MTM scheduling line at [phone] or toll-free " at [phone].     My Clinical Pharmacist's contact information:                                                      Please feel free to contact me with any questions or concerns you have.      Arnav Teran Rph.  Medication Therapy Management Provider  [phone]

## 2024-09-14 ENCOUNTER — E-VISIT (OUTPATIENT)
Dept: URGENT CARE | Facility: CLINIC | Age: 29
End: 2024-09-14
Payer: COMMERCIAL

## 2024-09-14 DIAGNOSIS — N89.8 VAGINAL DISCHARGE: Primary | ICD-10-CM

## 2024-09-14 PROCEDURE — 99207 PR NON-BILLABLE SERV PER CHARTING: CPT | Performed by: FAMILY MEDICINE

## 2024-09-15 NOTE — PATIENT INSTRUCTIONS

## 2024-09-29 NOTE — PROGRESS NOTES
Medication Therapy Management (MTM) Encounter    ASSESSMENT:                            Medication Adherence/Access: No issues identified    Class III Obesity (BMI 40 or more):/NAFLD:   Patient would benefit from  :Lets increase weekly fv. Cmpd semaglutide from 0.5mg./week=10 units to highest tolerated weekly dose --see plan for details and lifestyle modifications.       PLAN:                            1. Glad to hear your on the Semaglutide weekly injection-yesterday your dose was 0.5mg.(10units)--see how that goes this week--if not much appetite suppression ok to slowly increase your weekly dose by a few units as tolerated to get to the perfect weekly dose.   I did send anti-nausea rx to your [facility] pharmacy for you to have on hand.    Keep excellent lifestyle changes that your incorporating.    How to Inject Compounded Weight Loss Medication- Semaglutide      How it is supplied:  You will receive 1ml or 2ml vial of medicine depending on your dose. You will also be sent syringes and needles for a month supply.    Storage:   Keep your medication stored in the fridge.   The vials are to be discarded 28 days after first use or expiration date, whichever is first (even if there is remaining medication).     Injecting:  Remove your vial from the refrigerator and allow to reach room temperature by sitting out on a counter/ table.  Using the syringes provided with the medication vial, withdraw the prescribed amount of medication to fill the syringe for your dose.             Follow the instructions to fill the syringe with medicine:       Follow the guide below to inject your medication:     [facility]: [phone]        Follow-up: Return in about 8 weeks (around 11/26/2024), or @ 1;30 PM, for Medication Therapy Management Visit, Weight loss.    SUBJECTIVE/OBJECTIVE:                          MALAIKA Epstein is a 28 year old female seen for a follow-up (9-4-24) visit. She was referred to me from  Nikki Platt  .      Reason for visit:   Semaglutide f/up.        Allergies/ADRs: reviewed in chart today .  Past Medical History: Reviewed in chart and Per patient NAFLD .  Tobacco: She reports that she has never smoked. She has never used smokeless tobacco.  Alcohol: 2 nights /week and has 2 drinks those nights.  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  Personal Healthcare Goals: wt.loss goal = lose 100lbs. --167 nikos.  Activity: walks a bit , yoga once/week now.     Medication Adherence/Access: no issues reported    Weight Management /NAFLD:  Started fv. Cmpd Semaglutide weekly injection -started 9-17-24--0.25mg./week, then as of yesterday she is at 10 units =0.5mg./week now.  Not really getting appetite suppression yet. Mild nausea SE.  Home weight =261lbs.--no clothes .    Eats proteins, walk daily , no hand weights , but goes to hot [employer] -hot yoga, core strength training.    Previously :   Never tried --couldn't get any of them. See PA denial :  PA for this medication was submitted and denied in encounter dated 07/29/2024. Per insurance, medication is excluded from patient's benefit plan and will not be covered. Review and appeal are not available because of this exclusion.           WW years ago keto worked not sustainable,     Nutrition/Eating Habits:   Bfast- skips due to working late, awakes at 11am -eats 12;30-1pm- 2 eggs, toast ,meat like turkey or sausage, then eats again at 7pm -pasta at work or FF , then again eats at bedtime- sandwich or mac and cheese.    Not much snacking -except late at night yes-chips or cookies.    Exercise/Activity: see above activity.  Medications Tried/Failed:  None.     Initial Consult Weight: 267lbs.         Wt Readings from Last 4 Encounters:   08/20/24 267 lb (121.1 kg)   07/23/24 266 lb (120.7 kg)   01/23/24 260 lb 9.6 oz (118.2 kg)   09/13/23 261 lb (118.4  "kg)     Estimated body mass index is 44.43 kg/m  as calculated from the following:    Height as of 8/20/24: 5' 5\" (1.651 m).    Weight as of 8/20/24: 267 lb (121.1 kg).          ---------------------------------------------------------------------------------------------------------------------    I spent 20 minutes with this patient today. All changes were made via collaborative practice agreement with Nikki Platt DO. A copy of the visit note was provided to the patient's provider(s).    A summary of these recommendations was sent via [employer].    Arnav Teran Rph.  Medication Therapy Management Provider  [phone]      Telemedicine Visit Details  Type of service:  Telephone visit  Start Time: 1:30 PM  End Time: 1;50 PM     Medication Therapy Recommendations  Class 3 severe obesity with serious comorbidity and body mass index (BMI) of 40.0 to 44.9 in adult, unspecified obesity type (H)    Current Medication: COMPOUNDED NON-CONTROLLED SUBSTANCE (CMPD RX) - PHARMACY TO MIX COMPOUNDED MEDICATION   Rationale: Dose too low - Dosage too low - Effectiveness   Recommendation: Increase Dose   Status: Accepted per CPA                "

## 2024-10-02 ENCOUNTER — VIRTUAL VISIT (OUTPATIENT)
Dept: PHARMACY | Facility: CLINIC | Age: 29
End: 2024-10-02
Payer: COMMERCIAL

## 2024-10-02 DIAGNOSIS — K76.0 HEPATIC STEATOSIS: ICD-10-CM

## 2024-10-02 DIAGNOSIS — E66.813 CLASS 3 SEVERE OBESITY WITH SERIOUS COMORBIDITY AND BODY MASS INDEX (BMI) OF 40.0 TO 44.9 IN ADULT, UNSPECIFIED OBESITY TYPE (H): Primary | ICD-10-CM

## 2024-10-02 DIAGNOSIS — E66.01 CLASS 3 SEVERE OBESITY WITH SERIOUS COMORBIDITY AND BODY MASS INDEX (BMI) OF 40.0 TO 44.9 IN ADULT, UNSPECIFIED OBESITY TYPE (H): Primary | ICD-10-CM

## 2024-10-02 DIAGNOSIS — R11.0 NAUSEA: ICD-10-CM

## 2024-10-02 PROCEDURE — 99607 MTMS BY PHARM ADDL 15 MIN: CPT | Mod: 93 | Performed by: PHARMACIST

## 2024-10-02 PROCEDURE — 99606 MTMS BY PHARM EST 15 MIN: CPT | Mod: 93 | Performed by: PHARMACIST

## 2024-10-02 RX ORDER — ONDANSETRON 4 MG/1
4 TABLET, FILM COATED ORAL EVERY 8 HOURS PRN
Qty: 30 TABLET | Refills: 1 | Status: SHIPPED | OUTPATIENT
Start: 2024-10-02

## 2024-10-02 NOTE — PATIENT INSTRUCTIONS
"Recommendations from today's MTM visit:                                                         1. Glad to hear your on the Semaglutide weekly injection-yesterday your dose was 0.5mg.(10units)--see how that goes this week--if not much appetite suppression ok to slowly increase your weekly dose by a few units as tolerated to get to the perfect weekly dose.   I did send anti-nausea rx to your [facility] pharmacy for you to have on hand.    Keep excellent lifestyle changes that your incorporating.    How to Inject Compounded Weight Loss Medication- Semaglutide      How it is supplied:  You will receive 1ml or 2ml vial of medicine depending on your dose. You will also be sent syringes and needles for a month supply.    Storage:   Keep your medication stored in the fridge.   The vials are to be discarded 28 days after first use or expiration date, whichever is first (even if there is remaining medication).     Injecting:  Remove your vial from the refrigerator and allow to reach room temperature by sitting out on a counter/ table.  Using the syringes provided with the medication vial, withdraw the prescribed amount of medication to fill the syringe for your dose.             Follow the instructions to fill the syringe with medicine:       Follow the guide below to inject your medication:     [facility]: [phone]        Follow-up: Return in about 8 weeks (around 11/26/2024), or @ 1;30 PM, for Medication Therapy Management Visit, Weight loss.    It was great speaking with you today.  I value your experience and would be very thankful for your time in providing feedback in our clinic survey. In the next few days, you may receive an email or text message from [employer] with a link to a survey related to your  clinical pharmacist.\"     To schedule another MTM appointment, please call the clinic directly or you may call the MTM scheduling line at [phone] or toll-free at [phone].     My " Clinical Pharmacist's contact information:                                                      Please feel free to contact me with any questions or concerns you have.      Arnav Teran Rph.  Medication Therapy Management Provider  [phone]

## 2024-10-02 NOTE — Clinical Note
Laure--barbara--burton off to good start on fv. Cmpd semaglutide--she is increasing dose slowly weekly to get to the sweet spot --f/up with me in late November -arsenio

## 2024-10-28 ENCOUNTER — TELEPHONE (OUTPATIENT)
Dept: FAMILY MEDICINE | Facility: CLINIC | Age: 29
End: 2024-10-28
Payer: COMMERCIAL

## 2024-10-28 NOTE — TELEPHONE ENCOUNTER
New Medication Request    Contacts       Contact Date/Time Type Contact Phone/Fax    10/28/2024 01:36 PM CDT Phone (Incoming) MALAIKA Epstein (Self) [phone] (M)            What medication are you requesting?: ANTI DEPRESSANTS     Reason for medication request: WANTS TO UP DOSAGE     Have you taken this medication before?: Yes: TAKEN LAST ON 10/27/2024    Controlled Substance Agreement on file:   CSA -- Patient Level:    CSA: None found at the patient level.         Patient offered an appointment? Yes: Patient wants appt with pcp but none open until December. Would also like to discuss birth control     Preferred Pharmacy:   [id number] - [place], [place] - [address] AT [facility] [address]  [address]  [place] [address]  Phone: [phone] Fax: [phone]    [id number] - [place], [place] - [address] [facility] [facility] [address] [place] [address]  Phone: [phone] Fax: [phone]    [facility] [address] [place] [address]  [address] [facility] [address]  Phone: [phone] Fax: [phone]      Could we send this information to you in [facility] or would you prefer to receive a phone call?:   No preference   Okay to leave a detailed message?: Yes at Cell number on file:    Telephone Information:   Mobile [phone]

## 2024-10-29 ENCOUNTER — VIRTUAL VISIT (OUTPATIENT)
Dept: FAMILY MEDICINE | Facility: CLINIC | Age: 29
End: 2024-10-29
Payer: COMMERCIAL

## 2024-10-29 DIAGNOSIS — Z30.09 BIRTH CONTROL COUNSELING: Primary | ICD-10-CM

## 2024-10-29 DIAGNOSIS — F41.9 ANXIETY: ICD-10-CM

## 2024-10-29 PROCEDURE — 99442 PR PHYSICIAN TELEPHONE EVALUATION 11-20 MIN: CPT | Mod: 93 | Performed by: PHYSICIAN ASSISTANT

## 2024-10-29 RX ORDER — NORETHINDRONE ACETATE AND ETHINYL ESTRADIOL .03; 1.5 MG/1; MG/1
1 TABLET ORAL DAILY
Qty: 90 TABLET | Refills: 1 | Status: SHIPPED | OUTPATIENT
Start: 2024-10-29

## 2024-10-29 RX ORDER — HYDROXYZINE HYDROCHLORIDE 10 MG/1
10 TABLET, FILM COATED ORAL 3 TIMES DAILY PRN
Qty: 90 TABLET | Refills: 3 | Status: SHIPPED | OUTPATIENT
Start: 2024-10-29

## 2024-10-29 ASSESSMENT — ANXIETY QUESTIONNAIRES
5. BEING SO RESTLESS THAT IT IS HARD TO SIT STILL: NOT AT ALL
4. TROUBLE RELAXING: NOT AT ALL
GAD7 TOTAL SCORE: 4
1. FEELING NERVOUS, ANXIOUS, OR ON EDGE: SEVERAL DAYS
GAD7 TOTAL SCORE: 4
IF YOU CHECKED OFF ANY PROBLEMS ON THIS QUESTIONNAIRE, HOW DIFFICULT HAVE THESE PROBLEMS MADE IT FOR YOU TO DO YOUR WORK, TAKE CARE OF THINGS AT HOME, OR GET ALONG WITH OTHER PEOPLE: SOMEWHAT DIFFICULT
2. NOT BEING ABLE TO STOP OR CONTROL WORRYING: SEVERAL DAYS
3. WORRYING TOO MUCH ABOUT DIFFERENT THINGS: SEVERAL DAYS
6. BECOMING EASILY ANNOYED OR IRRITABLE: SEVERAL DAYS
GAD7 TOTAL SCORE: 4
8. IF YOU CHECKED OFF ANY PROBLEMS, HOW DIFFICULT HAVE THESE MADE IT FOR YOU TO DO YOUR WORK, TAKE CARE OF THINGS AT HOME, OR GET ALONG WITH OTHER PEOPLE?: SOMEWHAT DIFFICULT
7. FEELING AFRAID AS IF SOMETHING AWFUL MIGHT HAPPEN: NOT AT ALL
7. FEELING AFRAID AS IF SOMETHING AWFUL MIGHT HAPPEN: NOT AT ALL

## 2024-10-29 ASSESSMENT — PATIENT HEALTH QUESTIONNAIRE - PHQ9
SUM OF ALL RESPONSES TO PHQ QUESTIONS 1-9: 6
SUM OF ALL RESPONSES TO PHQ QUESTIONS 1-9: 6
10. IF YOU CHECKED OFF ANY PROBLEMS, HOW DIFFICULT HAVE THESE PROBLEMS MADE IT FOR YOU TO DO YOUR WORK, TAKE CARE OF THINGS AT HOME, OR GET ALONG WITH OTHER PEOPLE: SOMEWHAT DIFFICULT

## 2024-10-29 NOTE — PROGRESS NOTES
"MALAIKA is a 28 year old who is being evaluated via a billable telephone visit.    What phone number would you like to be contacted at? [phone]  How would you like to obtain your AVS? Olga  Originating Location (pt. Location): Home    Distant Location (provider location):  Off-site    Assessment & Plan     Anxiety  Titrated to 50 mg and does feel like med is helping without side effects.  Wishes to continue.  Also uses prn hydroxyzine with good relief of acute anxiety  - sertraline (ZOLOFT) 50 MG tablet; Take 1 tablet (50 mg) by mouth daily.  - hydrOXYzine HCl (ATARAX) 10 MG tablet; Take 1 tablet (10 mg) by mouth 3 times daily as needed for anxiety.    Birth control counseling  Discussed potential side effect of birth control including but not limited to blood clot.  She is aware of those symptoms.  Does not smoke  - norethindrone-ethinyl estradiol (MICROGESTIN 1.5/30) 1.5-30 MG-MCG tablet; Take 1 tablet by mouth daily.          BMI  Estimated body mass index is 44.43 kg/m  as calculated from the following:    Height as of 8/20/24: 1.651 m (5' 5\").    Weight as of 8/20/24: 121.1 kg (267 lb).             Subjective   MALAIKA is a 28 year old, presenting for the following health issues:  Recheck Medication (Restart Birth control/)        7/23/2024    11:54 AM   Additional Questions   Roomed by Soraida Joy     History of Present Illness       Reason for visit:  I need a new dosage of meds prescribed to me.   She is taking medications regularly.       Anxiety   How are you doing with your anxiety since your last visit? Improved   Are you having other symptoms that might be associated with anxiety? No  Have you had a significant life event? No   Are you feeling depressed? No  Do you have any concerns with your use of alcohol or other drugs? No    Social History     Tobacco Use    Smoking status: Never    Smokeless tobacco: Never   Vaping Use    Vaping status: Never Used   Substance Use Topics    Alcohol use: Yes "     Comment: socially    Drug use: No         7/15/2021     1:01 PM 1/23/2024     1:33 PM 10/29/2024     9:44 AM   NABEEL-7 SCORE   Total Score 14 (moderate anxiety) 9 (mild anxiety) 4 (minimal anxiety)   Total Score 14 9 4        Patient-reported         1/23/2024     1:17 PM 7/23/2024    12:53 AM 10/29/2024     9:43 AM   PHQ   PHQ-9 Total Score 8 14 6    Q9: Thoughts of better off dead/self-harm past 2 weeks Not at all  Not at all  Not at all        Patient-reported       She has also started a new relationship and would like to restart birth control.  Has been on both patch and pill in the past.  Prefers pill.  Zero side effects from what she remember.          Review of Systems  Constitutional, HEENT, cardiovascular, pulmonary, gi and gu systems are negative, except as otherwise noted.      Objective    Vitals - Patient Reported  Systolic (Patient Reported):  (n/a)  Diastolic (Patient Reported):  (n/a)  Weight (Patient Reported):  (n/a)  Height (Patient Reported):  (n/a)  SpO2 (Patient Reported):  (n/a)  Temperature (Patient Reported):  (n/a)  Pulse (Patient Reported):  (n/a)      Vitals:  No vitals were obtained today due to virtual visit.    Physical Exam   General: Alert and no distress //Respiratory: No audible wheeze, cough, or shortness of breath // Psychiatric:  Appropriate affect, tone, and pace of words            Phone call duration: 13 minutes  Signed Electronically by: Bon Hamlin PA-C

## 2024-11-17 ENCOUNTER — HEALTH MAINTENANCE LETTER (OUTPATIENT)
Age: 29
End: 2024-11-17

## 2024-12-04 ENCOUNTER — THERAPY VISIT (OUTPATIENT)
Dept: PHYSICAL THERAPY | Facility: CLINIC | Age: 29
End: 2024-12-04
Attending: INTERNAL MEDICINE
Payer: COMMERCIAL

## 2024-12-04 DIAGNOSIS — M54.16 RIGHT LUMBAR RADICULOPATHY: ICD-10-CM

## 2024-12-04 NOTE — PROGRESS NOTES
PHYSICAL THERAPY EVALUATION  Type of Visit: Evaluation    {Disappearing TIP  Adult Abuse Screen not completed in this Encounter. Please complete screening!:654516}      {TIP  The Fall Risk Screen has not been completed. Complete the screen!:051280}    Subjective      {Disappearing TIP  Health History pop-up / flowsheet :871856}   Presenting condition or subjective complaint: (Patient-Rptd) Sciatica. Pain whenever I get up to stand or bend. Pain shooting down to my right ankle now. Pt reports an insidious onset of R sided LBP and gluteal pain with radiation into R posterior  thigh, and lower leg. Pt reports an onset ~6 months ago without improvement despite trying independent exercises.   Date of onset:   {Disappearing TIP  Date of onset/injury :962848}   Relevant medical history: (Patient-Rptd) Depression; Dizziness; Overweight   Dates & types of surgery:      Prior diagnostic imaging/testing results:       Prior therapy history for the same diagnosis, illness or injury: (Patient-Rptd) No        Living Environment  Social support: (Patient-Rptd) With a significant other or spouse   Type of home: (Patient-Rptd) [facility]   Stairs to enter the home: (Patient-Rptd) Yes (Patient-Rptd) 15 Is there a railing: (Patient-Rptd) Yes     Ramp: (Patient-Rptd) No   Stairs inside the home: (Patient-Rptd) Yes (Patient-Rptd) 15 Is there a railing: (Patient-Rptd) Yes     Help at home: (Patient-Rptd) None  Equipment owned:       Employment: (Patient-Rptd) Yes (Patient-Rptd) CNA and   Hobbies/Interests:      Patient goals for therapy: (Patient-Rptd) Not wince every time i stand up from sitting. Not have to sit to put on my pants when i get dressed.    Pain assessment: See objective evaluation for additional pain details     Objective   LUMBAR SPINE EVALUATION  PAIN: Pain Level at Rest: 3/10  Pain Level with Use: 8/10  Pain Location: R lower back, gluteals, posterior thigh, lower leg  Pain Quality: Sharp and Shooting  Pain  Frequency: intermittent  Pain is Worst: w/ standing  Pain is Exacerbated By: standing after prolonged sitting  Pain is Relieved By: NSAIDs and rest  Pain Progression: Worsened  INTEGUMENTARY (edema, incisions): {BUD PT Integumentary (Optional):697122}  POSTURE: {BUD PT Posture (Optional):003659}  GAIT:   Weightbearing Status: {BUD PT GT WB STATUS (Optional):790347}  Assistive Device(s): {BUD PT EQUIP (Optional):213430}  Gait Deviations: {BUD PT GT DEV (Optional):663123}  BALANCE/PROPRIOCEPTION: {bud pt bal/proprioception (Optional):850988}  WEIGHTBEARING ALIGNMENT: {BUD PT WB Alignment (Optional):977286}  NON-WEIGHTBEARING ALIGNMENT: {BUD PT NWB Alignment (Optional):313923}   ROM:   (Degrees) Left AROM Left PROM  Right AROM Right PROM   Hip Flexion       Hip Extension       Hip Abduction       Hip Adduction       Hip Internal Rotation       Hip External Rotation       Knee Flexion       Knee Extension       Lumbar Side glide     Lumbar Flexion Min loss, pain ++ into R lower leg   Lumbar Extension Min loss   Pain:   End feel:   PELVIC/SI SCREEN: {bud pt pelvic screen (Optional):921909}  STRENGTH: {bud pt strength lumbar (Optional):825544}  {optional MDT eval:100269}  MYOTOMES: {BUD PT Myotomes (Optional):004477}  DTR S: {BUD PT DTRs (Optional):826184}  CORD SIGNS: {BUD PT Cord Signs (Optional):739631}  DERMATOMES: {BUD PT Dermatomes (Optional):958328}  NEURAL TENSION: {BUD PT Neural Tension (Optional):038818}  FLEXIBILITY: {BUD PT Flexibility (Optional):684171}  LUMBAR/HIP Special Tests: {bud pt spec test hip (Optional):785733}   PELVIS/SI SPECIAL TESTS: {BUD PT PELVIS SI SPEC TESTS (Optional):651480}  FUNCTIONAL TESTS: {BUD PT Functional Tests (Optional):945812}  PALPATION: {BUD PT Palpation Lumbar (Optional):990250}  SPINAL SEGMENTAL CONCLUSIONS: {BUD PT LUMB SPINAL SEG CONCLUSIONS:059575}  {optional MedX eval:106760}    Assessment & Plan   CLINICAL IMPRESSIONS  Medical Diagnosis:    {Disappearing TIP  Medical Dx  ":500824}  Treatment Diagnosis:   {Disappearing TIP  Treatment Dx :146730}  Impression/Assessment: {indiana pt assessment:667387}    Clinical Decision Making (Complexity):  Clinical Presentation: {Presentation:003165}  Clinical Presentation Rationale: based on medical and personal factors listed in PT evaluation  Clinical Decision Making (Complexity): {Complexity:310064}    PLAN OF CARE  Treatment Interventions:  {:733455}    Long Term Goals {Disappearing TIP  Goals :665870}         {Disapparing TIP  Frequency/Duration :031583}  Frequency of Treatment:    Duration of Treatment:      Recommended Referrals to Other Professionals: {indiana referrals suggested:600679}  Education Assessment: {Disapparing TIP  Patient Education :360452}       Risks and benefits of evaluation/treatment have been explained.   Patient/Family/caregiver agrees with Plan of Care.     Evaluation Time:   {Disappearing TIP  Eval Minutes :153610}     {EVAL ONLY:151591::\"Evaluation Only\"}     Signing Clinician: Kamran Sheehan, PT            "

## 2024-12-11 ENCOUNTER — OFFICE VISIT (OUTPATIENT)
Dept: FAMILY MEDICINE | Facility: CLINIC | Age: 29
End: 2024-12-11
Payer: COMMERCIAL

## 2024-12-11 VITALS
WEIGHT: 258 LBS | SYSTOLIC BLOOD PRESSURE: 120 MMHG | HEIGHT: 65 IN | BODY MASS INDEX: 42.99 KG/M2 | TEMPERATURE: 97.5 F | DIASTOLIC BLOOD PRESSURE: 64 MMHG

## 2024-12-11 DIAGNOSIS — Z11.3 SCREENING EXAMINATION FOR VENEREAL DISEASE: Primary | ICD-10-CM

## 2024-12-11 DIAGNOSIS — N76.0 BV (BACTERIAL VAGINOSIS): ICD-10-CM

## 2024-12-11 DIAGNOSIS — N89.8 VAGINAL DISCHARGE: ICD-10-CM

## 2024-12-11 DIAGNOSIS — N89.8 VAGINAL ITCHING: ICD-10-CM

## 2024-12-11 DIAGNOSIS — B96.89 BV (BACTERIAL VAGINOSIS): ICD-10-CM

## 2024-12-11 LAB
CLUE CELLS: PRESENT
T PALLIDUM AB SER QL: NONREACTIVE
TRICHOMONAS, WET PREP: ABNORMAL
WBC'S/HIGH POWER FIELD, WET PREP: ABNORMAL
YEAST, WET PREP: ABNORMAL

## 2024-12-11 PROCEDURE — 87591 N.GONORRHOEAE DNA AMP PROB: CPT | Performed by: FAMILY MEDICINE

## 2024-12-11 PROCEDURE — G2211 COMPLEX E/M VISIT ADD ON: HCPCS | Performed by: FAMILY MEDICINE

## 2024-12-11 PROCEDURE — 86803 HEPATITIS C AB TEST: CPT | Performed by: FAMILY MEDICINE

## 2024-12-11 PROCEDURE — 87491 CHLMYD TRACH DNA AMP PROBE: CPT | Performed by: FAMILY MEDICINE

## 2024-12-11 PROCEDURE — 87340 HEPATITIS B SURFACE AG IA: CPT | Performed by: FAMILY MEDICINE

## 2024-12-11 PROCEDURE — 87389 HIV-1 AG W/HIV-1&-2 AB AG IA: CPT | Performed by: FAMILY MEDICINE

## 2024-12-11 PROCEDURE — 86780 TREPONEMA PALLIDUM: CPT | Performed by: FAMILY MEDICINE

## 2024-12-11 PROCEDURE — 87210 SMEAR WET MOUNT SALINE/INK: CPT | Performed by: FAMILY MEDICINE

## 2024-12-11 PROCEDURE — 36415 COLL VENOUS BLD VENIPUNCTURE: CPT | Performed by: FAMILY MEDICINE

## 2024-12-11 PROCEDURE — 99213 OFFICE O/P EST LOW 20 MIN: CPT | Performed by: FAMILY MEDICINE

## 2024-12-11 RX ORDER — METRONIDAZOLE 500 MG/1
500 TABLET ORAL 2 TIMES DAILY
Qty: 14 TABLET | Refills: 0 | Status: SHIPPED | OUTPATIENT
Start: 2024-12-11 | End: 2024-12-18

## 2024-12-11 ASSESSMENT — PATIENT HEALTH QUESTIONNAIRE - PHQ9
SUM OF ALL RESPONSES TO PHQ QUESTIONS 1-9: 0
10. IF YOU CHECKED OFF ANY PROBLEMS, HOW DIFFICULT HAVE THESE PROBLEMS MADE IT FOR YOU TO DO YOUR WORK, TAKE CARE OF THINGS AT HOME, OR GET ALONG WITH OTHER PEOPLE: NOT DIFFICULT AT ALL
SUM OF ALL RESPONSES TO PHQ QUESTIONS 1-9: 0

## 2024-12-11 NOTE — PATIENT INSTRUCTIONS
Safer Sex: Care Instructions  Overview  Safer sex is a way to reduce your risk of getting a sexually transmitted infection (STI). It can also help prevent pregnancy.  Several products can help you practice safer sex and reduce your chance of STIs. One of the best is a condom. There are internal and external condoms. You can use a special rubber sheet (dental dam) for protection during oral sex. Disposable gloves can keep your hands from touching blood, semen, or other body fluids that can carry infections.  Remember that birth control methods such as diaphragms, IUDs, foams, and birth control pills do not stop you from getting STIs.  Follow-up care is a key part of your treatment and safety. Be sure to make and go to all appointments, and call your doctor if you are having problems. It's also a good idea to know your test results and keep a list of the medicines you take.  How can you care for yourself at home?  Think about getting vaccinated to help prevent hepatitis A, hepatitis B, and human papillomavirus (HPV). They can be spread through sex.  Use a condom every time you have sex. Use an external condom, which goes on the penis. Or use an internal condom, which goes into the vagina or anus.  Make sure you use the right size external condom. A condom that's too small can break easily. A condom that's too big can slip off during sex.  Use a new condom each time you have sex. Be careful not to poke a hole in the condom when you open the wrapper.  Don't use an internal condom and an external condom at the same time.  Never use petroleum jelly (such as Vaseline), grease, hand lotion, baby oil, or anything with oil in it. These products can make holes in the condom.  After intercourse, hold the edge of the condom as you remove it. This will help keep semen from spilling out of the condom.  Do not have sex with anyone who has symptoms of an STI, such as sores on the genitals or mouth.  Do not drink a lot of alcohol or  "use drugs before sex.  Limit your sex partners. Sex with one partner who has sex only with you can reduce your risk of getting an STI.  Don't share sex toys. But if you do share them, use a condom and clean the sex toys between each use.  Talk to any partners before you have sex. Talk about what you feel comfortable with and whether you have any boundaries with sex. And find out if your partner or partners may be at risk for any STI. Keep in mind that a person may be able to spread an STI even if they do not have symptoms. You and any partners may want to get tested for STIs.  Where can you learn more?  Go to https://www.[employer].net/patiented  Enter B608 in the search box to learn more about \"Safer Sex: Care Instructions.\"  Current as of: November 27, 2023  Content Version: 14.2 2024 [facility] [employer].   Care instructions adapted under license by your healthcare professional. If you have questions about a medical condition or this instruction, always ask your healthcare professional. [employer] disclaims any warranty or liability for your use of this information.    "

## 2024-12-11 NOTE — PROGRESS NOTES
"  {PROVIDER CHARTING PREFERENCE:325040}    Edita DAI is a 28 year old, presenting for the following health issues:  STD and yeat infection       12/11/2024     2:55 PM   Additional Questions   Roomed by M   Accompanied by self     STD    History of Present Illness       Reason for visit:  Yeast or bv infection,std testing   She is taking medications regularly.       {MA/LPN/RN Pre-Provider Visit Orders- hCG/UA/Strep (Optional):566934}  {SUPERLIST (Optional):289141}  {additonal problems for provider to add (Optional):255257}    {ROS Picklists (Optional):132172}      Objective    /64 (BP Location: Left arm, Patient Position: Sitting, Cuff Size: Adult Large)   Temp 97.5  F (36.4  C) (Temporal)   Ht 1.651 m (5' 5\")   Wt 117 kg (258 lb)   LMP 12/01/2024   BMI 42.93 kg/m    Body mass index is 42.93 kg/m .  Physical Exam   {Exam List (Optional):885595}    {Diagnostic Test Results (Optional):116766}        Signed Electronically by: Estephania Contreras MD  {Email feedback regarding this note to [email or web address]   :342092}    Prior to immunization administration, verified patients identity using patient s name and date of birth. Please see Immunization Activity for additional information.     Screening Questionnaire for Adult Immunization    Are you sick today?   No   Do you have allergies to medications, food, a vaccine component or latex?   No   Have you ever had a serious reaction after receiving a vaccination?   No   Do you have a long-term health problem with heart, lung, kidney, or metabolic disease (e.g., diabetes), asthma, a blood disorder, no spleen, complement component deficiency, a cochlear implant, or a spinal fluid leak?  Are you on long-term aspirin therapy?   No   Do you have cancer, leukemia, HIV/AIDS, or any other immune system problem?   No   Do you have a parent, brother, or sister with an immune system problem?   No   In the past 3 months, have you taken " medications that affect  your immune system, such as prednisone, other steroids, or anticancer drugs; drugs for the treatment of rheumatoid arthritis, Crohn s disease, or psoriasis; or have you had radiation treatments?   No   Have you had a seizure, or a brain or other nervous system problem?   No   During the past year, have you received a transfusion of blood or blood    products, or been given immune (gamma) globulin or antiviral drug?   No   For women: Are you pregnant or is there a chance you could become       pregnant during the next month?   No   Have you received any vaccinations in the past 4 weeks?   No     Immunization questionnaire answers were all negative.      Patient instructed to remain in clinic for 15 minutes afterwards, and to report any adverse reactions.     Screening performed by DEVYN Richey MA on 12/11/2024 at 3:00 PM.    (vagina)     Status: Abnormal    Specimen: Vagina; Swab   Result Value Ref Range    Trichomonas Absent Absent    Yeast Absent Absent    Clue Cells Present (A) Absent    WBCs/high power field 1+ (A) None           Signed Electronically by: Estephania Contreras MD      Prior to immunization administration, verified patients identity using patient s name and date of birth. Please see Immunization Activity for additional information.     Screening Questionnaire for Adult Immunization    Are you sick today?   No   Do you have allergies to medications, food, a vaccine component or latex?   No   Have you ever had a serious reaction after receiving a vaccination?   No   Do you have a long-term health problem with heart, lung, kidney, or metabolic disease (e.g., diabetes), asthma, a blood disorder, no spleen, complement component deficiency, a cochlear implant, or a spinal fluid leak?  Are you on long-term aspirin therapy?   No   Do you have cancer, leukemia, HIV/AIDS, or any other immune system problem?   No   Do you have a parent, brother, or sister with an immune system problem?   No   In the past 3 months, have you taken medications that affect  your immune system, such as prednisone, other steroids, or anticancer drugs; drugs for the treatment of rheumatoid arthritis, Crohn s disease, or psoriasis; or have you had radiation treatments?   No   Have you had a seizure, or a brain or other nervous system problem?   No   During the past year, have you received a transfusion of blood or blood    products, or been given immune (gamma) globulin or antiviral drug?   No   For women: Are you pregnant or is there a chance you could become       pregnant during the next month?   No   Have you received any vaccinations in the past 4 weeks?   No     Immunization questionnaire answers were all negative.      Patient instructed to remain in clinic for 15 minutes afterwards, and to report any adverse reactions.     Screening performed by DEVYN SHEPARD  KAVON Richey on 12/11/2024 at 3:00 PM.

## 2024-12-12 LAB
C TRACH DNA SPEC QL PROBE+SIG AMP: NEGATIVE
HBV SURFACE AG SERPL QL IA: NONREACTIVE
HCV AB SERPL QL IA: NONREACTIVE
HIV 1+2 AB+HIV1 P24 AG SERPL QL IA: NONREACTIVE
N GONORRHOEA DNA SPEC QL NAA+PROBE: NEGATIVE

## 2024-12-18 ENCOUNTER — OFFICE VISIT (OUTPATIENT)
Dept: FAMILY MEDICINE | Facility: CLINIC | Age: 29
End: 2024-12-18
Payer: COMMERCIAL

## 2024-12-18 VITALS
RESPIRATION RATE: 20 BRPM | BODY MASS INDEX: 42.77 KG/M2 | HEART RATE: 76 BPM | OXYGEN SATURATION: 97 % | WEIGHT: 257 LBS | SYSTOLIC BLOOD PRESSURE: 111 MMHG | TEMPERATURE: 99.6 F | DIASTOLIC BLOOD PRESSURE: 72 MMHG

## 2024-12-18 DIAGNOSIS — N89.8 VAGINAL SORE: Primary | ICD-10-CM

## 2024-12-18 PROCEDURE — 87529 HSV DNA AMP PROBE: CPT | Performed by: PHYSICIAN ASSISTANT

## 2024-12-18 PROCEDURE — 99213 OFFICE O/P EST LOW 20 MIN: CPT | Performed by: PHYSICIAN ASSISTANT

## 2024-12-18 RX ORDER — VALACYCLOVIR HYDROCHLORIDE 1 G/1
1000 TABLET, FILM COATED ORAL 2 TIMES DAILY
Qty: 20 TABLET | Refills: 0 | Status: SHIPPED | OUTPATIENT
Start: 2024-12-18 | End: 2024-12-28

## 2024-12-18 NOTE — PROGRESS NOTES
Subjective:      Angelia Epstein is a 28 year old female with chief complaint of possible herpes infection.  She was seen about a week ago for STD screening.  She was having some general vaginal discharge, but no other symptoms.  Standard STD screening was all negative.  She is found to have BV and was treated for that.  She is almost done with her metronidazole.  2 or 3 days ago, she developed pain in the genital area.  She has noticed a rash on the labia that is quite painful.  Hurts to urinate.    No known STD exposures.  No known previous herpes infection.    Patient Active Problem List   Diagnosis    Chronic constipation    Headache    Insomnia    Decreased strength    Secondary oligomenorrhea    Current moderate episode of major depressive disorder without prior episode (H)    NABEEL (generalized anxiety disorder)    Class 3 severe obesity with serious comorbidity and body mass index (BMI) of 40.0 to 44.9 in adult, unspecified obesity type (H)    Papanicolaou smear of cervix with low grade squamous intraepithelial lesion (LGSIL)    Hepatic steatosis    Moderate episode of recurrent major depressive disorder (H)        Current Outpatient Medications:     COMPOUNDED NON-CONTROLLED SUBSTANCE (CMPD RX) - PHARMACY TO MIX COMPOUNDED MEDICATION, Compound Semaglutide 0.25mg./week sub-q injection., Disp: 1 mL, Rfl: 3    cyclobenzaprine (FLEXERIL) 10 MG tablet, Take 1 tablet (10 mg) by mouth 3 times daily as needed for muscle spasms, Disp: 30 tablet, Rfl: 0    hydrOXYzine HCl (ATARAX) 10 MG tablet, Take 1 tablet (10 mg) by mouth 3 times daily as needed for anxiety., Disp: 90 tablet, Rfl: 3    metroNIDAZOLE (FLAGYL) 500 MG tablet, Take 1 tablet (500 mg) by mouth 2 times daily for 7 days., Disp: 14 tablet, Rfl: 0    norethindrone-ethinyl estradiol (MICROGESTIN 1.5/30) 1.5-30 MG-MCG tablet, Take 1 tablet by mouth daily., Disp: 90 tablet, Rfl: 1    ondansetron (ZOFRAN) 4 MG tablet, Take 1 tablet (4 mg) by mouth every 8 hours  as needed for nausea., Disp: 30 tablet, Rfl: 1    sertraline (ZOLOFT) 25 MG tablet, Take 1 tablet (25 mg) by mouth daily, Disp: 90 tablet, Rfl: 1    sertraline (ZOLOFT) 50 MG tablet, Take 1 tablet (50 mg) by mouth daily., Disp: 90 tablet, Rfl: 1    valACYclovir (VALTREX) 1000 mg tablet, Take 1 tablet (1,000 mg) by mouth 2 times daily for 10 days., Disp: 20 tablet, Rfl: 0      Objective:     Allergies:  Cats    /72 (BP Location: Right arm, Patient Position: Sitting, Cuff Size: Adult Large)   Pulse 76   Temp 99.6  F (37.6  C) (Tympanic)   Resp 20   Wt 116.6 kg (257 lb)   LMP 12/01/2024 (Exact Date)   SpO2 97%   BMI 42.77 kg/m    Body mass index is 42.77 kg/m .    General: Alert and oriented x 3, in no apparent distress  Genitourinary: External genitalia is normal in appearance, inner labia majora has multiple small sores present on right and left sides, a few of them appear to have a pustule, all of these are very small    Today's Labs pending.    Assessment and Plan:   1. Vaginal sore (Primary)  Concern.  Suspect genital herpes, given presentation and symptom report.  I will follow-up with herpes swab taken today.  Prescription sent for valacyclovir.  This should be compatible with her other medications.  I discussed general course and treatment for herpes.  I could also provide patient with printed information if test does indeed come back positive.  We discussed other symptomatic treatment.  She declines other STD screening tests today.  - HSV Types 1 and 2 Qualitative PCR CSF; Future  - valACYclovir (VALTREX) 1000 mg tablet; Take 1 tablet (1,000 mg) by mouth 2 times daily for 10 days.  Dispense: 20 tablet; Refill: 0      This dictation uses voice recognition software, which may contain typographical errors.

## 2024-12-19 PROBLEM — A60.00 HERPES SIMPLEX INFECTION OF GENITOURINARY SYSTEM: Status: ACTIVE | Noted: 2024-12-19

## 2024-12-19 LAB
HSV1 DNA SPEC QL NAA+PROBE: NOT DETECTED
HSV2 DNA SPEC QL NAA+PROBE: DETECTED
SPECIMEN TYPE: ABNORMAL

## 2025-01-13 ENCOUNTER — TELEPHONE (OUTPATIENT)
Dept: FAMILY MEDICINE | Facility: CLINIC | Age: 30
End: 2025-01-13
Payer: COMMERCIAL

## 2025-01-13 NOTE — TELEPHONE ENCOUNTER
Reason for Call:  Appointment Request    Patient requesting this type of appt:  SAME DAY    Requested provider: Nikki Platt    Reason patient unable to be scheduled: Not within requested timeframe    When does patient want to be seen/preferred time: Same day    Comments: PATIENT WAS SEEN FRIDAY, GIVING ANTIBIOTICS AND NOW SAYING THROAT PAIN IS WORSE, I SCHEDUL[place]PT [place] FOR [place], BUT PT WANTS APPT IN [place] TODAY    Could we send this information to you in [facility] or would you prefer to receive a phone call?:   Patient would prefer a phone call   Okay to leave a detailed message?: Yes at Cell number on file:    Telephone Information:   Mobile [phone]       Call taken on 1/13/2025 at 2:24 PM by Xochitl Corey

## 2025-01-14 ENCOUNTER — MYC MEDICAL ADVICE (OUTPATIENT)
Dept: FAMILY MEDICINE | Facility: CLINIC | Age: 30
End: 2025-01-14

## 2025-01-14 ENCOUNTER — OFFICE VISIT (OUTPATIENT)
Dept: FAMILY MEDICINE | Facility: CLINIC | Age: 30
End: 2025-01-14
Payer: COMMERCIAL

## 2025-01-14 VITALS
RESPIRATION RATE: 10 BRPM | HEART RATE: 65 BPM | WEIGHT: 258.6 LBS | TEMPERATURE: 98.3 F | OXYGEN SATURATION: 96 % | SYSTOLIC BLOOD PRESSURE: 110 MMHG | HEIGHT: 65 IN | BODY MASS INDEX: 43.09 KG/M2 | DIASTOLIC BLOOD PRESSURE: 70 MMHG

## 2025-01-14 DIAGNOSIS — B37.31 YEAST INFECTION OF THE VAGINA: ICD-10-CM

## 2025-01-14 DIAGNOSIS — N76.0 ACUTE VAGINITIS: ICD-10-CM

## 2025-01-14 DIAGNOSIS — J02.9 SORE THROAT: Primary | ICD-10-CM

## 2025-01-14 DIAGNOSIS — B96.89 BACTERIAL VAGINOSIS: ICD-10-CM

## 2025-01-14 DIAGNOSIS — N76.0 BACTERIAL VAGINOSIS: ICD-10-CM

## 2025-01-14 LAB
CLUE CELLS: PRESENT
MONOCYTES NFR BLD AUTO: NEGATIVE %
TRICHOMONAS, WET PREP: ABNORMAL
WBC'S/HIGH POWER FIELD, WET PREP: ABNORMAL
YEAST, WET PREP: PRESENT

## 2025-01-14 PROCEDURE — 36415 COLL VENOUS BLD VENIPUNCTURE: CPT | Performed by: NURSE PRACTITIONER

## 2025-01-14 PROCEDURE — 99214 OFFICE O/P EST MOD 30 MIN: CPT | Performed by: NURSE PRACTITIONER

## 2025-01-14 PROCEDURE — 87491 CHLMYD TRACH DNA AMP PROBE: CPT | Mod: 59 | Performed by: NURSE PRACTITIONER

## 2025-01-14 PROCEDURE — 87635 SARS-COV-2 COVID-19 AMP PRB: CPT | Performed by: NURSE PRACTITIONER

## 2025-01-14 PROCEDURE — 86308 HETEROPHILE ANTIBODY SCREEN: CPT | Performed by: NURSE PRACTITIONER

## 2025-01-14 PROCEDURE — 87591 N.GONORRHOEAE DNA AMP PROB: CPT | Mod: 59 | Performed by: NURSE PRACTITIONER

## 2025-01-14 PROCEDURE — 87210 SMEAR WET MOUNT SALINE/INK: CPT | Performed by: NURSE PRACTITIONER

## 2025-01-14 RX ORDER — METRONIDAZOLE 7.5 MG/G
1 GEL VAGINAL DAILY
Qty: 25 G | Refills: 0 | Status: SHIPPED | OUTPATIENT
Start: 2025-01-14 | End: 2025-01-19

## 2025-01-14 RX ORDER — FLUCONAZOLE 150 MG/1
150 TABLET ORAL ONCE
Qty: 1 TABLET | Refills: 0 | Status: SHIPPED | OUTPATIENT
Start: 2025-01-14 | End: 2025-01-14

## 2025-01-14 RX ORDER — NYSTATIN 100000 [USP'U]/ML
500000 SUSPENSION ORAL 4 TIMES DAILY
Qty: 140 ML | Refills: 0 | Status: SHIPPED | OUTPATIENT
Start: 2025-01-14 | End: 2025-01-21

## 2025-01-14 RX ORDER — HYDROXYZINE HYDROCHLORIDE 10 MG/1
10 TABLET, FILM COATED ORAL 3 TIMES DAILY PRN
Qty: 90 TABLET | Refills: 3 | Status: CANCELLED | OUTPATIENT
Start: 2025-01-14

## 2025-01-14 ASSESSMENT — PAIN SCALES - GENERAL: PAINLEVEL_OUTOF10: WORST PAIN (10)

## 2025-01-14 ASSESSMENT — ENCOUNTER SYMPTOMS: SORE THROAT: 1

## 2025-01-14 NOTE — PROGRESS NOTES
"  Assessment & Plan     Sore throat  Five days ago, started with stuffy nose and sore throat.   Went to  1/10/24- negative Strep- was prescribed Augmentin for lymphadenopathy- taking as prescribed. Does not feel symptoms are improving, and pain on throat has become worse.  Today, physical exam revealing white stops on back of throat and uvula - no signs of compromised airway. Will r/o covid-19, mono, g/c - will initiate empiric treatment with Nystatin for thrush?   Pending lab results - will update with additional recommendations once results are available.  - COVID-19 Virus (Coronavirus) by PCR Nasopharyngeal  - Mononucleosis screen  - Chlamydia trachomatis/Neisseria gonorrhoeae by PCR  - magic mouthwash suspension (diphenhydrAMINE, lidocaine, aluminum-magnesium & simethicone)  Dispense: 400 mL; Refill: 0  - Mononucleosis screen  - nystatin (MYCOSTATIN) 455479 UNIT/ML suspension  Dispense: 140 mL; Refill: 0    Acute vaginitis  - Wet prep - Clinic Collect  - Chlamydia trachomatis/Neisseria gonorrhoeae by PCR    Bacterial vaginosis  - metroNIDAZOLE (METROGEL) 0.75 % vaginal gel  Dispense: 25 g; Refill: 0    Yeast infection of the vagina  - fluconazole (DIFLUCAN) 150 MG tablet  Dispense: 1 tablet; Refill: 0            BMI  Estimated body mass index is 43.03 kg/m  as calculated from the following:    Height as of this encounter: 1.651 m (5' 5\").    Weight as of this encounter: 117.3 kg (258 lb 9.6 oz).             Edita DAI is a 29 year old, presenting for the following health issues:  Pharyngitis (Patient here for sore throat. Pt given abx Friday at clinic, symptoms worsening. Strep negative at that time.) and Vaginal Problem (Pt c/o vaginal itching/burning)        1/14/2025    11:21 AM   Additional Questions   Roomed by Lashae HEART RN   Accompanied by self         1/14/2025    11:21 AM   Patient Reported Additional Medications   Patient reports taking the following new medications None     1.Five days ago, " "started with stuffy nose and sore throat  Today, ears hurt a little, and sore throat is worse. Feels like her uvula is inflamed and can feel it moving. Painful to swallow but otherwise, no difficulty swallowing.  No breathing issues   Feeling very tired, has been sleeping this past 4 days.   Went to  1/10/24- negative Strep- was prescribed Augmentin for lymphadenopathy- taking as prescribed. Does not feel symptoms are improving.    Has been taking ibuprofen 800mg TID and tylenol 500mg 2-3 times a day  Took a left over tylenol #3 thinking it would help but it did not. - took 1 tab a day for 3 days (had left over from hx of broken toe right before thanksgiving)    2. About a months ago, found out that has herpes  Feels like having a yeast infection- itching, white discharge.  Had BV a month ago, and was treated.     History of Present Illness       Reason for visit:  Sore throat  Symptom onset:  1-3 days ago   She is taking medications regularly.                     Objective    /70 (BP Location: Left arm, Patient Position: Sitting, Cuff Size: Adult Regular)   Pulse 65   Temp 98.3  F (36.8  C) (Oral)   Resp 10   Ht 1.651 m (5' 5\")   Wt 117.3 kg (258 lb 9.6 oz)   LMP  (LMP Unknown)   SpO2 96%   BMI 43.03 kg/m    Body mass index is 43.03 kg/m .  Physical Exam   GENERAL: alert and no distress  EYES: Eyes grossly normal to inspection, PERRL and conjunctivae and sclerae normal  HENT: ear canals and TM's normal, nose without ulcers or lesions. White spots on uvula and back or throat.   NECK: no adenopathy, no asymmetry, masses, or scars  RESP: lungs clear to auscultation - no rales, rhonchi or wheezes  CV: regular rate and rhythm, normal S1 S2, no S3 or S4, no murmur, click or rub, no peripheral edema  MS: no gross musculoskeletal defects noted, no edema            Signed Electronically by: SARAI Theodore CNP    "

## 2025-01-14 NOTE — TELEPHONE ENCOUNTER
IDALIA for pt.  I recommend pt keep her 1110 appt at [facility] today.  We don't have any appts at  today.  CORTEZ Mcdowell

## 2025-01-15 ENCOUNTER — LAB (OUTPATIENT)
Dept: LAB | Facility: CLINIC | Age: 30
End: 2025-01-15
Payer: COMMERCIAL

## 2025-01-15 DIAGNOSIS — J02.9 SORE THROAT: ICD-10-CM

## 2025-01-15 PROBLEM — R87.612 PAPANICOLAOU SMEAR OF CERVIX WITH LOW GRADE SQUAMOUS INTRAEPITHELIAL LESION (LGSIL): Status: ACTIVE | Noted: 2023-09-13

## 2025-01-15 LAB
C TRACH DNA SPEC QL PROBE+SIG AMP: NEGATIVE
C TRACH DNA SPEC QL PROBE+SIG AMP: NEGATIVE
N GONORRHOEA DNA SPEC QL NAA+PROBE: NEGATIVE
N GONORRHOEA DNA SPEC QL NAA+PROBE: NEGATIVE
SARS-COV-2 RNA RESP QL NAA+PROBE: NEGATIVE
SPECIMEN TYPE: NORMAL
SPECIMEN TYPE: NORMAL

## 2025-01-15 PROCEDURE — 87529 HSV DNA AMP PROBE: CPT

## 2025-01-16 LAB
HSV1 DNA SPEC QL NAA+PROBE: NOT DETECTED
HSV2 DNA SPEC QL NAA+PROBE: NOT DETECTED
SPECIMEN TYPE: NORMAL

## 2025-01-22 ENCOUNTER — MYC REFILL (OUTPATIENT)
Dept: FAMILY MEDICINE | Facility: CLINIC | Age: 30
End: 2025-01-22

## 2025-01-22 DIAGNOSIS — N89.8 VAGINAL SORE: ICD-10-CM

## 2025-01-22 DIAGNOSIS — F41.9 ANXIETY: ICD-10-CM

## 2025-01-23 RX ORDER — VALACYCLOVIR HYDROCHLORIDE 1 G/1
TABLET, FILM COATED ORAL
Qty: 20 TABLET | Refills: 0 | OUTPATIENT
Start: 2025-01-23

## 2025-01-23 RX ORDER — VALACYCLOVIR HYDROCHLORIDE 1 G/1
1000 TABLET, FILM COATED ORAL 2 TIMES DAILY
Qty: 20 TABLET | Refills: 0 | Status: CANCELLED | OUTPATIENT
Start: 2025-01-23

## 2025-01-23 RX ORDER — HYDROXYZINE HYDROCHLORIDE 10 MG/1
10 TABLET, FILM COATED ORAL 3 TIMES DAILY PRN
Qty: 90 TABLET | Refills: 2 | Status: SHIPPED | OUTPATIENT
Start: 2025-01-23

## 2025-01-23 RX ORDER — VALACYCLOVIR HYDROCHLORIDE 1 G/1
1000 TABLET, FILM COATED ORAL 2 TIMES DAILY
Qty: 20 TABLET | Refills: 0 | Status: SHIPPED | OUTPATIENT
Start: 2025-01-23 | End: 2025-02-02

## 2025-01-23 NOTE — TELEPHONE ENCOUNTER
Writer called and left message on patient's voicemail. Valtrex ordered by Dr. Harp.     Allie Rodríguez, BSN RN  [facility]

## 2025-01-23 NOTE — TELEPHONE ENCOUNTER
Dr. Harp ([facility]) --    Please review and advise: new outbreak of herpes: anus.   Prescription pended below for valacyclovir if agree.    Patient was tested for herpes in throat which was negative.   Patient positive for genital herpes in December and having outbreak in anus.   1/15. Tested negative for herpes.   1/14. Tested negative for chlamydia and gonorrhea.     Ok to [facility]. Patient would like a return call.     MUMTAZ BledsoeN RN  [facility]

## 2025-01-23 NOTE — TELEPHONE ENCOUNTER
Patient calling asking if this can get filled today and also I have changed pharmacy that she would rather have it filled at. Patient said amber would not let her choose the pharmacy that she wanted.   So I have changed that for her  [facility] on [address]

## 2025-01-23 NOTE — TELEPHONE ENCOUNTER
Clinic RN: Medication requested was prescribed for acute condition. Please follow-up with pt and triage if appropriate.     Krista VAZQUEZ RN  [facility]

## 2025-02-05 DIAGNOSIS — F41.9 ANXIETY: ICD-10-CM

## 2025-02-07 NOTE — TELEPHONE ENCOUNTER
Writer called and left message on patient's voicemail to call back and speak with a triage nurse.  Okay to leave vml msg on Cell    Message:  Patient has Two doses on file; which is patient taking?     MUMTAZ BledsoeN RN  [facility]

## 2025-02-12 NOTE — TELEPHONE ENCOUNTER
Left message to call back and ask to speak with an available nurse or check MyChart.    DESTINY Leahy BSN, PHN, RN-[facility]  [phone]

## 2025-02-13 RX ORDER — SERTRALINE HYDROCHLORIDE 25 MG/1
25 TABLET, FILM COATED ORAL DAILY
Qty: 90 TABLET | Refills: 1 | OUTPATIENT
Start: 2025-02-13

## 2025-02-13 NOTE — TELEPHONE ENCOUNTER
Further chart review shows 50 mg is current dose, and too soon for refill.    DESTINY Leahy, BSN, PHN, RN-[facility]  [phone]

## 2025-03-12 ENCOUNTER — PATIENT OUTREACH (OUTPATIENT)
Dept: CARE COORDINATION | Facility: CLINIC | Age: 30
End: 2025-03-12
Payer: COMMERCIAL

## 2025-03-19 ENCOUNTER — OFFICE VISIT (OUTPATIENT)
Dept: URGENT CARE | Facility: URGENT CARE | Age: 30
End: 2025-03-19
Payer: COMMERCIAL

## 2025-03-19 ENCOUNTER — E-VISIT (OUTPATIENT)
Dept: URGENT CARE | Facility: CLINIC | Age: 30
End: 2025-03-19
Payer: COMMERCIAL

## 2025-03-19 VITALS
RESPIRATION RATE: 12 BRPM | SYSTOLIC BLOOD PRESSURE: 110 MMHG | OXYGEN SATURATION: 100 % | HEIGHT: 65 IN | BODY MASS INDEX: 43.32 KG/M2 | WEIGHT: 260 LBS | TEMPERATURE: 98 F | HEART RATE: 85 BPM | DIASTOLIC BLOOD PRESSURE: 74 MMHG

## 2025-03-19 DIAGNOSIS — B00.9 HSV (HERPES SIMPLEX VIRUS) INFECTION: ICD-10-CM

## 2025-03-19 DIAGNOSIS — F41.9 ANXIETY: ICD-10-CM

## 2025-03-19 DIAGNOSIS — Z72.51 UNPROTECTED SEX: ICD-10-CM

## 2025-03-19 DIAGNOSIS — N89.8 VAGINAL DISCHARGE: ICD-10-CM

## 2025-03-19 DIAGNOSIS — N89.8 VAGINAL SORE: ICD-10-CM

## 2025-03-19 DIAGNOSIS — N76.0 BV (BACTERIAL VAGINOSIS): Primary | ICD-10-CM

## 2025-03-19 DIAGNOSIS — B96.89 BV (BACTERIAL VAGINOSIS): Primary | ICD-10-CM

## 2025-03-19 DIAGNOSIS — Z11.3 SCREEN FOR STD (SEXUALLY TRANSMITTED DISEASE): ICD-10-CM

## 2025-03-19 DIAGNOSIS — N94.9 VAGINAL DISCOMFORT: Primary | ICD-10-CM

## 2025-03-19 LAB
CLUE CELLS: PRESENT
HCG UR QL: NEGATIVE
TRICHOMONAS, WET PREP: ABNORMAL
WBC'S/HIGH POWER FIELD, WET PREP: ABNORMAL
YEAST, WET PREP: ABNORMAL

## 2025-03-19 PROCEDURE — 3078F DIAST BP <80 MM HG: CPT | Performed by: INTERNAL MEDICINE

## 2025-03-19 PROCEDURE — 99207 PR NON-BILLABLE SERV PER CHARTING: CPT | Performed by: NURSE PRACTITIONER

## 2025-03-19 PROCEDURE — 99214 OFFICE O/P EST MOD 30 MIN: CPT | Performed by: INTERNAL MEDICINE

## 2025-03-19 PROCEDURE — 3074F SYST BP LT 130 MM HG: CPT | Performed by: INTERNAL MEDICINE

## 2025-03-19 RX ORDER — METRONIDAZOLE 7.5 MG/G
1 GEL VAGINAL DAILY
Qty: 35 G | Refills: 0 | Status: SHIPPED | OUTPATIENT
Start: 2025-03-19 | End: 2025-03-26

## 2025-03-19 RX ORDER — VALACYCLOVIR HYDROCHLORIDE 1 G/1
1000 TABLET, FILM COATED ORAL 2 TIMES DAILY
Qty: 20 TABLET | Refills: 0 | Status: SHIPPED | OUTPATIENT
Start: 2025-03-19

## 2025-03-19 RX ORDER — METRONIDAZOLE 500 MG/1
500 TABLET ORAL 2 TIMES DAILY
Qty: 14 TABLET | Refills: 0 | Status: SHIPPED | OUTPATIENT
Start: 2025-03-19 | End: 2025-03-26

## 2025-03-19 NOTE — PROGRESS NOTES
ASSESSMENT AND PLAN:      ICD-10-CM    1. BV (bacterial vaginosis)  N76.0 metroNIDAZOLE (FLAGYL) 500 MG tablet    B96.89 metroNIDAZOLE (METROGEL) 0.75 % vaginal gel      2. Screen for STD (sexually transmitted disease)  Z11.3 Chlamydia trachomatis/Neisseria gonorrhoeae by PCR - Clinic Collect     Treponema Abs w Reflex to RPR and Titer     Treponema Abs w Reflex to RPR and Titer     HIV Antigen Antibody Combo     HIV Antigen Antibody Combo     CANCELED: **HIV Antigen Antibody Combo FUTURE 2mo      3. Vaginal discharge  N89.8 Wet prep - Clinic Collect      4. Vaginal sore  N89.8 valACYclovir (VALTREX) 1000 mg tablet      5. HSV (herpes simplex virus) infection  B00.9 valACYclovir (VALTREX) 1000 mg tablet      6. Anxiety  F41.9     Refilled hydroxyzine.      7. Unprotected sex  Z72.51           Would like refill valtrex.        PLAN:      Patient Instructions       Pregnancy test is negative.  Vaginal swab wet prep shows bacterial vaginosis.  Take Flagyl twice daily for 1 week and nightly for 1 week as requested    If you get recurrent BV, increase boric acid vaginal suppositories to balance the vaginal pH    [place]    Patient screened for HIV syphilis gonorrhea and chlamydia.  Patient requested both oral Flagyl and vaginal Flagyl for BV.  Discussed it is usually 1 or the other, not both.\  She has been using boric acid suppository weekly.  Since her symptoms recurred she can do it a few times a week  And if symptoms do recur could try it nightly    Patient would prefer to avoid urgent care for frequent BV.  Discussed she may be able to do e-visits or come up with a plan with her primary.  Also same for Valtrex refill medications.  She also requested anxiety refill.  Discussed that I could give her 1 month at a time otherwise she would need to get it from her primary.  She prefers to follow-up with her primary for the anxiety refill    Bia Salmeron MD  [facility]      "chart primary    Subjective     Angelia Epstein is a 29 year old who presents for Patient presents with:  Urinary Problem: Concerned about BV. Itching, abnormal discharge and abnormal smell. Wants pregnancy test, full STD panel and medication refill for valtrex.    [facility]    GYN Complaint    Onset of symptoms was few week(s) ago.    Current and Associated symptoms: vaginal discharge d, vulvar itching, and odor  Treatment measures tried include:  weekly boric acid discharge  Sexually active: yes,   Predisposing factors: sexually active  Hx of previous symptom:  frequent    Review of Systems        Objective    /74   Pulse 85   Temp 98  F (36.7  C) (Temporal)   Resp 12   Ht 1.651 m (5' 5\")   Wt 117.9 kg (260 lb)   LMP 03/10/2025   SpO2 100%   BMI 43.27 kg/m    Physical Exam  Vitals reviewed.   Constitutional:       Appearance: Normal appearance. She is not ill-appearing.   Neurological:      Mental Status: She is alert.            Results for orders placed or performed in visit on 03/19/25 (from the past 24 hours)   Wet prep - Clinic Collect    Specimen: Vagina; Swab   Result Value Ref Range    Trichomonas Absent Absent    Yeast Absent Absent    Clue Cells Present (A) Absent    WBCs/high power field 1+ (A) None   HCG qualitative urine   Result Value Ref Range    hCG Urine Qualitative Negative Negative         "

## 2025-03-19 NOTE — PATIENT INSTRUCTIONS
Pregnancy test is negative.  Vaginal swab wet prep shows bacterial vaginosis.  Take Flagyl twice daily for 1 week and nightly for 1 week as requested    If you get recurrent BV, increase boric acid vaginal suppositories to balance the vaginal pH    sTD testing on [facility]

## 2025-03-20 LAB
C TRACH DNA SPEC QL PROBE+SIG AMP: NEGATIVE
HIV 1+2 AB+HIV1 P24 AG SERPL QL IA: NONREACTIVE
N GONORRHOEA DNA SPEC QL NAA+PROBE: NEGATIVE
SPECIMEN TYPE: NORMAL
T PALLIDUM AB SER QL: NONREACTIVE

## 2025-03-25 DIAGNOSIS — F41.9 ANXIETY: ICD-10-CM

## 2025-04-13 ENCOUNTER — MYC REFILL (OUTPATIENT)
Dept: FAMILY MEDICINE | Facility: CLINIC | Age: 30
End: 2025-04-13
Payer: COMMERCIAL

## 2025-04-13 DIAGNOSIS — M54.9 BILATERAL BACK PAIN, UNSPECIFIED BACK LOCATION, UNSPECIFIED CHRONICITY: ICD-10-CM

## 2025-04-13 RX ORDER — CYCLOBENZAPRINE HCL 10 MG
10 TABLET ORAL 3 TIMES DAILY PRN
Qty: 30 TABLET | Refills: 0 | Status: SHIPPED | OUTPATIENT
Start: 2025-04-13

## 2025-05-19 ENCOUNTER — RESULTS FOLLOW-UP (OUTPATIENT)
Dept: INTERNAL MEDICINE | Facility: CLINIC | Age: 30
End: 2025-05-19

## 2025-05-19 ENCOUNTER — OFFICE VISIT (OUTPATIENT)
Dept: INTERNAL MEDICINE | Facility: CLINIC | Age: 30
End: 2025-05-19
Payer: COMMERCIAL

## 2025-05-19 VITALS
DIASTOLIC BLOOD PRESSURE: 72 MMHG | WEIGHT: 260 LBS | SYSTOLIC BLOOD PRESSURE: 114 MMHG | TEMPERATURE: 97.5 F | OXYGEN SATURATION: 99 % | HEART RATE: 65 BPM | RESPIRATION RATE: 13 BRPM | HEIGHT: 65 IN | BODY MASS INDEX: 43.32 KG/M2

## 2025-05-19 DIAGNOSIS — B96.89 BV (BACTERIAL VAGINOSIS): ICD-10-CM

## 2025-05-19 DIAGNOSIS — N76.0 BV (BACTERIAL VAGINOSIS): ICD-10-CM

## 2025-05-19 DIAGNOSIS — R87.612 PAPANICOLAOU SMEAR OF CERVIX WITH LOW GRADE SQUAMOUS INTRAEPITHELIAL LESION (LGSIL): Primary | ICD-10-CM

## 2025-05-19 DIAGNOSIS — Z30.09 BIRTH CONTROL COUNSELING: ICD-10-CM

## 2025-05-19 DIAGNOSIS — Z11.3 SCREEN FOR STD (SEXUALLY TRANSMITTED DISEASE): Primary | ICD-10-CM

## 2025-05-19 LAB
CLUE CELLS: PRESENT
HBV SURFACE AG SERPL QL IA: NONREACTIVE
HCV AB SERPL QL IA: NONREACTIVE
HIV 1+2 AB+HIV1 P24 AG SERPL QL IA: NONREACTIVE
T PALLIDUM AB SER QL: NONREACTIVE
TRICHOMONAS, WET PREP: ABNORMAL
WBC'S/HIGH POWER FIELD, WET PREP: ABNORMAL
YEAST, WET PREP: PRESENT

## 2025-05-19 PROCEDURE — 87591 N.GONORRHOEAE DNA AMP PROB: CPT | Performed by: INTERNAL MEDICINE

## 2025-05-19 PROCEDURE — 86803 HEPATITIS C AB TEST: CPT | Performed by: INTERNAL MEDICINE

## 2025-05-19 PROCEDURE — 86780 TREPONEMA PALLIDUM: CPT | Performed by: INTERNAL MEDICINE

## 2025-05-19 PROCEDURE — 87340 HEPATITIS B SURFACE AG IA: CPT | Performed by: INTERNAL MEDICINE

## 2025-05-19 PROCEDURE — 87210 SMEAR WET MOUNT SALINE/INK: CPT | Performed by: INTERNAL MEDICINE

## 2025-05-19 PROCEDURE — 3074F SYST BP LT 130 MM HG: CPT | Performed by: INTERNAL MEDICINE

## 2025-05-19 PROCEDURE — 87491 CHLMYD TRACH DNA AMP PROBE: CPT | Performed by: INTERNAL MEDICINE

## 2025-05-19 PROCEDURE — 1126F AMNT PAIN NOTED NONE PRSNT: CPT | Performed by: INTERNAL MEDICINE

## 2025-05-19 PROCEDURE — 87389 HIV-1 AG W/HIV-1&-2 AB AG IA: CPT | Performed by: INTERNAL MEDICINE

## 2025-05-19 PROCEDURE — 36415 COLL VENOUS BLD VENIPUNCTURE: CPT | Performed by: INTERNAL MEDICINE

## 2025-05-19 PROCEDURE — 3078F DIAST BP <80 MM HG: CPT | Performed by: INTERNAL MEDICINE

## 2025-05-19 PROCEDURE — 87624 HPV HI-RISK TYP POOLED RSLT: CPT | Performed by: INTERNAL MEDICINE

## 2025-05-19 PROCEDURE — 99214 OFFICE O/P EST MOD 30 MIN: CPT | Performed by: INTERNAL MEDICINE

## 2025-05-19 RX ORDER — METRONIDAZOLE 7.5 MG/G
1 GEL VAGINAL DAILY
Qty: 150 G | Refills: 0 | Status: SHIPPED | OUTPATIENT
Start: 2025-05-19

## 2025-05-19 RX ORDER — METRONIDAZOLE 500 MG/1
500 TABLET ORAL 2 TIMES DAILY
Qty: 14 TABLET | Refills: 0 | Status: SHIPPED | OUTPATIENT
Start: 2025-05-19 | End: 2025-05-26

## 2025-05-19 RX ORDER — FLUCONAZOLE 150 MG/1
150 TABLET ORAL
Qty: 3 TABLET | Refills: 0 | Status: SHIPPED | OUTPATIENT
Start: 2025-05-19 | End: 2025-05-26

## 2025-05-19 RX ORDER — NORETHINDRONE ACETATE AND ETHINYL ESTRADIOL .03; 1.5 MG/1; MG/1
1 TABLET ORAL DAILY
Qty: 90 TABLET | Refills: 4 | Status: SHIPPED | OUTPATIENT
Start: 2025-05-19

## 2025-05-19 ASSESSMENT — PAIN SCALES - GENERAL: PAINLEVEL_OUTOF10: NO PAIN (0)

## 2025-05-19 ASSESSMENT — PATIENT HEALTH QUESTIONNAIRE - PHQ9: SUM OF ALL RESPONSES TO PHQ QUESTIONS 1-9: 0

## 2025-05-19 NOTE — PROGRESS NOTES
"  Assessment & Plan     Birth control counseling  She just had her.  Declines pregnancy test.  - norethindrone-ethinyl estradiol (MICROGESTIN 1.5/30) 1.5-30 MG-MCG tablet; Take 1 tablet by mouth daily.  - HPV and Gynecologic Cytology Panel - Recommended Age 30-65 Years    Screen for STD (sexually transmitted disease)  She has had multiple STD testing in the past.  She says that she has unprotected sex with new partners and therefore needs repeated testing.  Counseled on safe sex practices.  She was lost to follow-up with her last abnormal Pap was supposed to get a LEEP procedure in October but says she cannot afford the cost.  Since its August since August has had her last Pap we will repeat Pap  - Hepatitis C antibody; Future  - HIV Antigen Antibody Combo; Future  - Treponema Abs w Reflex to RPR and Titer; Future  - Wet prep - Clinic Collect  - Hepatitis B surface antigen; Future  - NEISSERIA GONORRHOEA PCR; Future  - CHLAMYDIA TRACHOMATIS PCR; Future  - HPV and Gynecologic Cytology Panel - Recommended Age 30-65 Years  - Hepatitis C antibody  - HIV Antigen Antibody Combo  - Treponema Abs w Reflex to RPR and Titer  - Hepatitis B surface antigen  - NEISSERIA GONORRHOEA PCR  - CHLAMYDIA TRACHOMATIS PCR      Most likely has BV and will need a prolonged course has been present for over the last 10 months.    BMI  Estimated body mass index is 43.27 kg/m  as calculated from the following:    Height as of this encounter: 1.651 m (5' 5\").    Weight as of this encounter: 117.9 kg (260 lb).             Edita DAI is a 29 year old, presenting for the following health issues:  office visit (Pt reports they have been experiencing itching and burning in pelvic area lasting 1-2 weeks. Would like to be checked for STDs, BV, or yeast infection. )        5/19/2025    12:44 PM   Additional Questions   Roomed by Gagandeep   Accompanied by alone         5/19/2025    12:44 PM   Patient Reported Additional Medications   Patient " "reports taking the following new medications none     History of Present Illness       Reason for visit:  Yeast or BV, STD check  Symptom onset:  1-2 weeks ago  Symptoms include:  Itching, burning,  Symptom intensity:  Moderate  Symptom progression:  Worsening  Had these symptoms before:  Yes  Has tried/received treatment for these symptoms:  Yes  Previous treatment was successful:  Yes  Prior treatment description:  Pills and cream  What makes it worse:  No  What makes it better:  Medication   She is taking medications regularly.                      Objective    /72 (BP Location: Left arm, Patient Position: Sitting, Cuff Size: Adult Regular)   Pulse 65   Temp 97.5  F (36.4  C) (Temporal)   Resp 13   Ht 1.651 m (5' 5\")   Wt 117.9 kg (260 lb)   LMP 05/11/2025 (Exact Date)   SpO2 99%   BMI 43.27 kg/m    Body mass index is 43.27 kg/m .  Physical Exam   GENERAL: alert and no distress   (female): normal urethral meatus , vaginal discharge - moderate, white, green, and milky, and normal cervix, adnexae, and uterus without masses.    GC, wet prepp and Pap obtained        Signed Electronically by: Sallie Walters MD    "

## 2025-05-20 LAB
C TRACH DNA SPEC QL NAA+PROBE: NEGATIVE
N GONORRHOEA DNA SPEC QL NAA+PROBE: NEGATIVE
SPECIMEN TYPE: NORMAL
SPECIMEN TYPE: NORMAL

## 2025-05-21 LAB
HPV HR 12 DNA CVX QL NAA+PROBE: POSITIVE
HPV16 DNA CVX QL NAA+PROBE: NEGATIVE
HPV18 DNA CVX QL NAA+PROBE: NEGATIVE
HUMAN PAPILLOMA VIRUS FINAL DIAGNOSIS: ABNORMAL

## 2025-05-27 LAB
BKR AP ASSOCIATED HPV REPORT: ABNORMAL
BKR LAB AP GYN ADEQUACY: ABNORMAL
BKR LAB AP GYN INTERPRETATION: ABNORMAL
BKR LAB AP LMP: ABNORMAL
BKR LAB AP PREVIOUS ABNL DX: ABNORMAL
BKR LAB AP PREVIOUS ABNORMAL: ABNORMAL
PATH REPORT.COMMENTS IMP SPEC: ABNORMAL
PATH REPORT.COMMENTS IMP SPEC: ABNORMAL
PATH REPORT.RELEVANT HX SPEC: ABNORMAL

## 2025-06-02 ENCOUNTER — PATIENT OUTREACH (OUTPATIENT)
Dept: INTERNAL MEDICINE | Facility: CLINIC | Age: 30
End: 2025-06-02
Payer: COMMERCIAL

## 2025-06-02 NOTE — TELEPHONE ENCOUNTER
5/19/25 ASCUS, + HR HPV (not 16 or 18). Plan refer to gyn for LEEP or their opinion OR repeat pap in 6 months as she currently had an infection due bef 08/19/25

## 2025-06-03 NOTE — TELEPHONE ENCOUNTER
06/3/25 Pt returned the call I did discuss with her the results and recommendations. She said that she will make an appt with Ob/Gyn to discuss the possible LEEP procedure.

## 2025-06-11 DIAGNOSIS — N76.0 BV (BACTERIAL VAGINOSIS): ICD-10-CM

## 2025-06-11 DIAGNOSIS — B96.89 BV (BACTERIAL VAGINOSIS): ICD-10-CM

## 2025-06-11 DIAGNOSIS — B37.31 YEAST VAGINITIS: Primary | ICD-10-CM

## 2025-06-11 RX ORDER — METRONIDAZOLE 7.5 MG/G
1 GEL VAGINAL DAILY
Qty: 150 G | Refills: 0 | Status: SHIPPED | OUTPATIENT
Start: 2025-06-11

## 2025-06-11 RX ORDER — FLUCONAZOLE 150 MG/1
150 TABLET ORAL
Qty: 3 TABLET | Refills: 0 | Status: SHIPPED | OUTPATIENT
Start: 2025-06-11 | End: 2025-06-18

## 2025-06-11 RX ORDER — METRONIDAZOLE 500 MG/1
500 TABLET ORAL 2 TIMES DAILY
Qty: 14 TABLET | Refills: 0 | Status: SHIPPED | OUTPATIENT
Start: 2025-06-11 | End: 2025-06-18

## 2025-06-20 DIAGNOSIS — F41.9 ANXIETY: ICD-10-CM

## 2025-06-21 ASSESSMENT — ANXIETY QUESTIONNAIRES
GAD7 TOTAL SCORE: 4
IF YOU CHECKED OFF ANY PROBLEMS ON THIS QUESTIONNAIRE, HOW DIFFICULT HAVE THESE PROBLEMS MADE IT FOR YOU TO DO YOUR WORK, TAKE CARE OF THINGS AT HOME, OR GET ALONG WITH OTHER PEOPLE: SOMEWHAT DIFFICULT
GAD7 TOTAL SCORE: 4
2. NOT BEING ABLE TO STOP OR CONTROL WORRYING: SEVERAL DAYS
GAD7 TOTAL SCORE: 4
7. FEELING AFRAID AS IF SOMETHING AWFUL MIGHT HAPPEN: NOT AT ALL
7. FEELING AFRAID AS IF SOMETHING AWFUL MIGHT HAPPEN: NOT AT ALL
4. TROUBLE RELAXING: NOT AT ALL
6. BECOMING EASILY ANNOYED OR IRRITABLE: SEVERAL DAYS
5. BEING SO RESTLESS THAT IT IS HARD TO SIT STILL: NOT AT ALL
8. IF YOU CHECKED OFF ANY PROBLEMS, HOW DIFFICULT HAVE THESE MADE IT FOR YOU TO DO YOUR WORK, TAKE CARE OF THINGS AT HOME, OR GET ALONG WITH OTHER PEOPLE?: SOMEWHAT DIFFICULT
1. FEELING NERVOUS, ANXIOUS, OR ON EDGE: SEVERAL DAYS
3. WORRYING TOO MUCH ABOUT DIFFERENT THINGS: SEVERAL DAYS

## 2025-07-14 PROBLEM — N87.1 DYSPLASIA OF CERVIX, HIGH GRADE CIN 2: Status: ACTIVE | Noted: 2023-09-13

## 2025-07-27 ENCOUNTER — NURSE TRIAGE (OUTPATIENT)
Dept: NURSING | Facility: CLINIC | Age: 30
End: 2025-07-27

## 2025-07-27 ENCOUNTER — OFFICE VISIT (OUTPATIENT)
Dept: URGENT CARE | Facility: URGENT CARE | Age: 30
End: 2025-07-27
Payer: COMMERCIAL

## 2025-07-27 VITALS
RESPIRATION RATE: 16 BRPM | OXYGEN SATURATION: 96 % | TEMPERATURE: 98.2 F | HEIGHT: 65 IN | WEIGHT: 259 LBS | BODY MASS INDEX: 43.15 KG/M2 | HEART RATE: 70 BPM | SYSTOLIC BLOOD PRESSURE: 102 MMHG | DIASTOLIC BLOOD PRESSURE: 74 MMHG

## 2025-07-27 DIAGNOSIS — R10.13 EPIGASTRIC PAIN: Primary | ICD-10-CM

## 2025-07-27 DIAGNOSIS — R19.7 DIARRHEA, UNSPECIFIED TYPE: ICD-10-CM

## 2025-07-27 DIAGNOSIS — K75.81 NASH (NONALCOHOLIC STEATOHEPATITIS): ICD-10-CM

## 2025-07-27 LAB
ALBUMIN SERPL BCG-MCNC: 4.4 G/DL (ref 3.5–5.2)
ALP SERPL-CCNC: 89 U/L (ref 40–150)
ALT SERPL W P-5'-P-CCNC: 65 U/L (ref 0–50)
ANION GAP SERPL CALCULATED.3IONS-SCNC: 10 MMOL/L (ref 7–15)
AST SERPL W P-5'-P-CCNC: 50 U/L (ref 0–45)
BILIRUB SERPL-MCNC: 0.3 MG/DL
BUN SERPL-MCNC: 6.9 MG/DL (ref 6–20)
CALCIUM SERPL-MCNC: 9.5 MG/DL (ref 8.8–10.4)
CHLORIDE SERPL-SCNC: 103 MMOL/L (ref 98–107)
CREAT SERPL-MCNC: 0.74 MG/DL (ref 0.51–0.95)
EGFRCR SERPLBLD CKD-EPI 2021: >90 ML/MIN/1.73M2
ERYTHROCYTE [DISTWIDTH] IN BLOOD BY AUTOMATED COUNT: 12.9 % (ref 10–15)
GLUCOSE SERPL-MCNC: 96 MG/DL (ref 70–99)
HCO3 SERPL-SCNC: 25 MMOL/L (ref 22–29)
HCT VFR BLD AUTO: 42.4 % (ref 35–47)
HGB BLD-MCNC: 14.2 G/DL (ref 11.7–15.7)
MCH RBC QN AUTO: 30.1 PG (ref 26.5–33)
MCHC RBC AUTO-ENTMCNC: 33.5 G/DL (ref 31.5–36.5)
MCV RBC AUTO: 90 FL (ref 78–100)
PLATELET # BLD AUTO: 368 10E3/UL (ref 150–450)
POTASSIUM SERPL-SCNC: 4.4 MMOL/L (ref 3.4–5.3)
PROT SERPL-MCNC: 7.6 G/DL (ref 6.4–8.3)
RBC # BLD AUTO: 4.71 10E6/UL (ref 3.8–5.2)
SODIUM SERPL-SCNC: 138 MMOL/L (ref 135–145)
WBC # BLD AUTO: 7.4 10E3/UL (ref 4–11)

## 2025-07-27 PROCEDURE — 85027 COMPLETE CBC AUTOMATED: CPT

## 2025-07-27 PROCEDURE — 80053 COMPREHEN METABOLIC PANEL: CPT

## 2025-07-27 PROCEDURE — 36415 COLL VENOUS BLD VENIPUNCTURE: CPT

## 2025-07-27 RX ORDER — PANTOPRAZOLE SODIUM 40 MG/1
40 TABLET, DELAYED RELEASE ORAL DAILY
Qty: 28 TABLET | Refills: 0 | Status: SHIPPED | OUTPATIENT
Start: 2025-07-27 | End: 2025-08-24

## 2025-07-27 NOTE — TELEPHONE ENCOUNTER
Patient has been having abdominal pain since Tuesday. On Tuesday, above her belly button she was having a lot of pain, like a twisting knot pain and was running a fever. Wednesday felt fine. Thursday at work the stomach returned, she had constant diarrhea. Same on Friday but with a fever. Saturday no fever, diarrhea, but the pain was on and off. Patient is concerned about H. Pylori as her step sister was diagnosed with this. Today patient continues to have liquid diarrhea that is dark colored. This morning her stomach really hurt but was relieved after eating something. Patient has been treating with Pepto Bismuth. Diarrhea has been fluctuating in frequency. Patient is hydrated and drinking extra fluids.   Denies vomiting, injury, constant pain at time of call.   Protocol recommends see PCP within 24 hours  Care advice given.   Patient will present to [facility] today and call back with worsening symptoms.   Norma Santana RN   07/27/25 2:37 PM  [facility] Nurse Advisor      Reason for Disposition   [1] MILD pain (e.g., does not interfere with normal activities) AND [2] comes and goes (cramps) AND [3] present > 72 hours  (Exception: This same abdominal pain is a chronic symptom recurrent or ongoing AND present > 4 weeks.)   [1] MODERATE diarrhea (e.g., 4-6 times / day more than normal) AND [2] present > 48 hours (2 days)    Additional Information   Negative: SEVERE difficulty breathing (e.g., struggling for each breath, speaks in single words)   Negative: Shock suspected (e.g., cold/pale/clammy skin, too weak to stand, low BP, rapid pulse)   Negative: Difficult to awaken or acting confused (e.g., disoriented, slurred speech)   Negative: Passed out (i.e., lost consciousness, collapsed and was not responding)   Negative: Visible sweat on face or sweat dripping down face   Negative: Sounds like a life-threatening emergency to the triager   Negative: Followed an abdomen (stomach) injury   Negative: Chest  "pain   Negative: [1] Abdominal pain AND [2] pregnant < 20 weeks   Negative: [1] Abdominal pain AND [2] pregnant 20 or more weeks   Negative: [1] Abdominal pain AND [2] postpartum (from 0 to 6 weeks after delivery)   Negative: Abdomen bloating or swelling are main symptoms   Negative: [1] SEVERE pain (e.g., excruciating) AND [2] present > 1 hour   Negative: [1] Pain lasts > 10 minutes AND [2] age > 50   Negative: [1] Pain lasts > 10 minutes AND [2] age > 40 AND [3] associated chest, arm, neck, upper back or jaw pain   Negative: [1] Pain lasts > 10 minutes AND [2] age > 35 AND [3] at least one cardiac risk factor (e.g., diabetes, high cholesterol, hypertension, obesity, smoker or strong family history of heart disease)   Negative: [1] Pain lasts > 10 minutes AND [2] history of heart disease (i.e., heart attack, bypass surgery, angina, angioplasty, CHF; not just a heart murmur)   Negative: [1] Pain lasts > 10 minutes AND [2] difficulty breathing   Negative: [1] Vomiting AND [2] contains red blood  (Exception: Few streaks and only occurred once.)   Negative: [1] Vomiting AND [2] contains black (\"coffee ground\") material   Negative: Blood in bowel movements  (Exception: Blood on surface of BM with constipation.)   Negative: Black or tarry bowel movements  (Exception: Chronic-unchanged black-grey BMs AND is taking iron pills or Pepto-Bismol.)   Negative: [1] Pregnant 20 or more weeks AND [2] new hand or face swelling   Negative: [1] Vomiting AND [2] contains bile (green color)   Negative: Patient sounds very sick or weak to the triager   Negative: [1] MILD-MODERATE pain AND [2] constant AND [3] present > 2 hours   Negative: [1] MILD-MODERATE pain AND [2] not relieved by antacid medicine   Negative: [1] Vomiting AND [2] abdomen looks much more swollen than usual   Negative: White of the eyes have turned yellow (i.e., jaundice)   Negative: Fever > 103 F (39.4 C)   Negative: [1] Fever > 101 F (38.3 C) AND [2] age > 60 " years   Negative: [1] Fever > 100.0 F (37.8 C) AND [2] bedridden (e.g., CVA, chronic illness, recovering from surgery)   Negative: [1] Fever > 100.0 F (37.8 C) AND [2] diabetes mellitus or weak immune system (e.g., HIV positive, cancer chemo, splenectomy, organ transplant, chronic steroids)   Negative: [1] MODERATE pain (e.g., interferes with normal activities) AND [2] comes and goes (cramps) AND [3] present > 24 hours  (Exception: Pain with Vomiting or Diarrhea - see that Guideline.)    Protocols used: Abdominal Pain - Upper-A-AH, Diarrhea-A-AH

## 2025-07-27 NOTE — PROGRESS NOTES
"  Assessment & Plan     Acute Diarrhea:  - Onset 6 days ago  - Intermittent fever (highest 101F)  - 7-8 episodes daily  - No chronic constipation history  - Patient well-hydrated, not acutely ill  - Likely acute infectious diarrhea    Plan:      - Continue Pepto-Bismol as needed      - Patient education: may persist up to 14 days      - Reassess if symptoms persist beyond 2 weeks    Epigastric Pain:  - Upper GI pain localized to epigastric region  - Described as stabbing sensation and feeling like a knot  - Distinct from urge to defecate    Plan:      - Start pantoprazole 40 mg PO daily for 1 month      - Patient education: short-term use, not for long-term therapy      - Follow up with PCP for:        - Potential H. pylori testing        - Consider EGD if symptoms persist        - Monitor liver enzymes    Non-alcoholic Fatty Liver Disease (NAFLD):  - Diagnosed 1.5-2 years ago  - Typically elevated liver enzymes, not high at present    Plan:      - Follow up with PCP for liver enzyme monitoring      - Likely will be elevated today    Edita DAI is a 29 year old, presenting for the following health issues:  Diarrhea (Episodes of fever, diarrhea, nauseated and stabbing pain In the upper abdomen x 6 days. Pt's sister had H pylori/)    HPI    Patient consented to the use of [employer] to record and transcribe notes during this visit.    Presenting Symptoms:  - Diarrhea and epigastric pain for 6 days  - Symptoms began Tuesday evening with mild diarrhea and fever  - Severe symptoms on Thursday including fever and diarrhea  - Diarrhea persisted Friday through Sunday, fever subsided after Thursday  - 7-8 episodes of diarrhea daily  - Upper GI pain in epigastric region, described as stabbing pain \"like a knot\"  - Highest measured fever: 101 F    Medical History:  - Non-alcoholic fatty liver disease (LANZA) diagnosed 1.5-2 years ago  - History of elevated liver enzymes    Medications and Supplements:  - Ibuprofen (taken " "for fever on Tuesday night)  - Pepto-Bismol (patient inquired about continuing use)    Social History:  - No recent international travel    Review of Systems:  General: Positive for fever  Gastrointestinal: Positive for diarrhea, epigastric pain    Patient denies:  - History of chronic constipation    Review of Systems  Constitutional, HEENT, cardiovascular, pulmonary, gi and gu systems are negative, except as otherwise noted.      Objective    /74 (BP Location: Left arm, Patient Position: Chair, Cuff Size: Adult Large)   Pulse 70   Temp 98.2  F (36.8  C) (Oral)   Resp 16   Ht 1.651 m (5' 5\")   Wt 117.5 kg (259 lb)   SpO2 96%   BMI 43.10 kg/m    Body mass index is 43.1 kg/m .  Physical Exam   GENERAL: alert and no distress  NECK: no adenopathy, no asymmetry, masses, or scars  RESP: lungs clear to auscultation - no rales, rhonchi or wheezes  CV: regular rate and rhythm, normal S1 S2, no S3 or S4, no murmur, click or rub, no peripheral edema  ABDOMEN: soft, tender to palpation over epigastric region, negative Mccormick's, no hepatosplenomegaly, no masses and bowel sounds normal  MS: no gross musculoskeletal defects noted, no edema        Signed Electronically by: Chris Mann DO    "

## 2025-08-12 ENCOUNTER — TELEPHONE (OUTPATIENT)
Dept: PHARMACY | Facility: CLINIC | Age: 30
End: 2025-08-12
Payer: COMMERCIAL